# Patient Record
Sex: MALE | Race: WHITE | NOT HISPANIC OR LATINO | Employment: FULL TIME | URBAN - NONMETROPOLITAN AREA
[De-identification: names, ages, dates, MRNs, and addresses within clinical notes are randomized per-mention and may not be internally consistent; named-entity substitution may affect disease eponyms.]

---

## 2017-03-20 ENCOUNTER — OFFICE VISIT (OUTPATIENT)
Dept: FAMILY MEDICINE CLINIC | Facility: CLINIC | Age: 47
End: 2017-03-20

## 2017-03-20 VITALS
TEMPERATURE: 98.7 F | HEART RATE: 85 BPM | DIASTOLIC BLOOD PRESSURE: 82 MMHG | SYSTOLIC BLOOD PRESSURE: 134 MMHG | RESPIRATION RATE: 12 BRPM | HEIGHT: 72 IN | OXYGEN SATURATION: 98 % | WEIGHT: 314 LBS | BODY MASS INDEX: 42.53 KG/M2

## 2017-03-20 DIAGNOSIS — G47.30 SLEEP APNEA, UNSPECIFIED: Primary | ICD-10-CM

## 2017-03-20 PROBLEM — Z86.59 HISTORY OF TICS: Status: ACTIVE | Noted: 2017-03-20

## 2017-03-20 PROCEDURE — 99213 OFFICE O/P EST LOW 20 MIN: CPT | Performed by: NURSE PRACTITIONER

## 2017-03-20 NOTE — PATIENT INSTRUCTIONS
Sleep Apnea   Sleep apnea is a sleep disorder characterized by abnormal pauses in breathing while you sleep. When your breathing pauses, the level of oxygen in your blood decreases. This causes you to move out of deep sleep and into light sleep. As a result, your quality of sleep is poor, and the system that carries your blood throughout your body (cardiovascular system) experiences stress. If sleep apnea remains untreated, the following conditions can develop:  · High blood pressure (hypertension).  · Coronary artery disease.  · Inability to achieve or maintain an erection (impotence).  · Impairment of your thought process (cognitive dysfunction).  There are three types of sleep apnea:  1. Obstructive sleep apnea--Pauses in breathing during sleep because of a blocked airway.  2. Central sleep apnea--Pauses in breathing during sleep because the area of the brain that controls your breathing does not send the correct signals to the muscles that control breathing.  3. Mixed sleep apnea--A combination of both obstructive and central sleep apnea.  RISK FACTORS  The following risk factors can increase your risk of developing sleep apnea:  · Being overweight.  · Smoking.  · Having narrow passages in your nose and throat.  · Being of older age.  · Being male.  · Alcohol use.  · Sedative and tranquilizer use.  · Ethnicity. Among individuals younger than 35 years,  Americans are at increased risk of sleep apnea.  SYMPTOMS   · Difficulty staying asleep.  · Daytime sleepiness and fatigue.  · Loss of energy.  · Irritability.  · Loud, heavy snoring.  · Morning headaches.  · Trouble concentrating.  · Forgetfulness.  · Decreased interest in sex.  · Unexplained sleepiness.  DIAGNOSIS   In order to diagnose sleep apnea, your caregiver will perform a physical examination. A sleep study done in the comfort of your own home may be appropriate if you are otherwise healthy. Your caregiver may also recommend that you spend the  "night in a sleep lab. In the sleep lab, several monitors record information about your heart, lungs, and brain while you sleep. Your leg and arm movements and blood oxygen level are also recorded.  TREATMENT  The following actions may help to resolve mild sleep apnea:  · Sleeping on your side.    · Using a decongestant if you have nasal congestion.    · Avoiding the use of depressants, including alcohol, sedatives, and narcotics.    · Losing weight and modifying your diet if you are overweight.  There also are devices and treatments to help open your airway:  · Oral appliances. These are custom-made mouthpieces that shift your lower jaw forward and slightly open your bite. This opens your airway.  · Devices that create positive airway pressure. This positive pressure \"splints\" your airway open to help you breathe better during sleep. The following devices create positive airway pressure:    Continuous positive airway pressure (CPAP) device. The CPAP device creates a continuous level of air pressure with an air pump. The air is delivered to your airway through a mask while you sleep. This continuous pressure keeps your airway open.    Nasal expiratory positive airway pressure (EPAP) device. The EPAP device creates positive air pressure as you exhale. The device consists of single-use valves, which are inserted into each nostril and held in place by adhesive. The valves create very little resistance when you inhale but create much more resistance when you exhale. That increased resistance creates the positive airway pressure. This positive pressure while you exhale keeps your airway open, making it easier to breath when you inhale again.    Bilevel positive airway pressure (BPAP) device. The BPAP device is used mainly in patients with central sleep apnea. This device is similar to the CPAP device because it also uses an air pump to deliver continuous air pressure through a mask. However, with the BPAP machine, the " pressure is set at two different levels. The pressure when you exhale is lower than the pressure when you inhale.  · Surgery. Typically, surgery is only done if you cannot comply with less invasive treatments or if the less invasive treatments do not improve your condition. Surgery involves removing excess tissue in your airway to create a wider passage way.     This information is not intended to replace advice given to you by your health care provider. Make sure you discuss any questions you have with your health care provider.     Document Released: 12/08/2003 Document Revised: 01/08/2016 Document Reviewed: 09/26/2016  [employer] Interactive Patient Education ©2016 [employer]

## 2017-03-20 NOTE — PROGRESS NOTES
Chief Complaint   Patient presents with   • Sleep Apnea        No Known Allergies    HPI: Danny Vega is a 46 y.o. male presents today for evaluation of sleep apnea.  He has difficulty sleeping and snoring,  Wife has sleep apnea and he tried her CPAP and he sleep very good and wife did not snore at all.   Rates overall 9/10. Has Hx of HTN stable with lisinopril, and hyperlipidemia stable with omega 3 fatty acids, barretts esophagus.  We have discussed sleep apnea and weight in the past and he says he is ready to do something about it.  We discussed contrave in the past and wants to try it.      Past Medical History   Diagnosis Date   • GERD (gastroesophageal reflux disease)    • Hyperlipidemia    • Hypertension      Past Surgical History   Procedure Laterality Date   • Appendectomy       Social History     Social History   • Marital status:      Spouse name: N/A   • Number of children: N/A   • Years of education: N/A     Social History Main Topics   • Smoking status: Never Smoker   • Smokeless tobacco: Never Used   • Alcohol use No   • Drug use: No   • Sexual activity: Defer     Other Topics Concern   • None     Social History Narrative     Family History   Problem Relation Age of Onset   • Hypertension Mother    • Hypertension Father    • No Known Problems Sister    • No Known Problems Daughter    • Diabetes Maternal Grandmother    • Hypertension Maternal Grandmother    • Dementia Maternal Grandmother    • Cancer Maternal Grandfather    • Diabetes Paternal Grandmother    • Cancer Paternal Grandfather        Current Outpatient Prescriptions on File Prior to Visit   Medication Sig Dispense Refill   • aspirin 81 MG EC tablet Take 81 mg by mouth Daily.     • glucosamine sulfate 500 MG capsule capsule Take  by mouth Daily.     • lisinopril (PRINIVIL,ZESTRIL) 10 MG tablet Take 1 tablet by mouth Daily. 90 tablet 1   • Multiple Vitamins-Minerals (MULTIVITAMIN PO) Take  by mouth Daily.     • Omega-3 Fatty  "Acids (FISH OIL) 1000 MG capsule capsule Take  by mouth Daily.     • RABEprazole (ACIPHEX) 20 MG EC tablet Take 20 mg by mouth Daily.       No current facility-administered medications on file prior to visit.         REVIEW OF SYMPTOMS: (Positives bolded)  General:  weight loss, fever, chills, night sweats, fatigue, appetite loss, daytime sleepiness   Respiratory: shortness of breath, cough, hemoptysis, wheezing, pleurisy, snoring   Cardiovascular:  chest pain, PND, palpitation, edema, orthopnea, syncope, swelling of extremities  Gastro: Nausea, vomiting, diarrhea, hematemesis, abdominal pain, constipation  Genito: hematuria, dysuria, glycosuria, hesitancy, frequency, incontinence  \"All other systems reviewed and negative, except as listed above.”      OBJECTIVE:  Constitutional:  Appearance-No acute distress, Consistent with stated age. Orientation- Oriented x 3, alert Posture-Not doubled over. Gait-Normal pace, normal arm movement. Posture- Normal Build and Nutrition-Well developed and well nourished.  General- Patient is pleasant and cooperative with the interview and exam.    Integumentary: General-No rashes, ulcers or lesions. No edema.  Palpation- Normal skin moisture/turgor. Skin is warm to touch, no increased warmth. Capillary refill is normal bilateral Upper and lower extremity.     Head/Neck: Head- normocephalic and atraumatic.  Neck- without visible/palpable lumps or pulsations.  Palpation- No bony tenderness about head/neck along frontal, occiptial, temporal, parietal, mastoid, jawline, zygoma, orbit or any other location.  NO temporal artery tenderness. No TMJ tenderness. Normal cervical ROM.   Neck Supple.  Thyroid-No thyromegaly, no nodules    Eye: Bilaterally PERRLA, EOMI.  No discharge.  Upper and lower eyelids are normal. Sclera/conjunctiva normal without discharge. Cornea is normal and clear. Lens is normal.  Eyeball appears normal. No ciliary flushing, no conjunctival injection.    ENMT:  Pinna- " normal without tenderness or erythema.  External auditory canal Left- normal without erythema or discharge, no excessive cerumen. External auditory canal Right-normal without erythema or discharge, no excessive cerumen. TM left- Grey/pearly, normal light reflex and anatomy TM Right- Grey/pearly, normal light reflex and anatomy Hearing Assessment-normal to conversational speech at 2-5 feet.  Nose and sinus-No sinus tenderness along frontal/maxillary region. External appearance normal and midline. Nares- bilateral quiet airflow, no discharge. Nasal mucosa- No bleeding noted and no ulcerations observed. Pink, moist. Turbinates non boggy. Lips- normal color, moist without cracks/lesions Oral Cavity/Palate- hard/soft palate intact without lesions, oral mucosa pink and moist. Dentition assessed and discussed appropriate oral care. Tongue normal midline.  Oropharynx- no pharyngeal erythema, Uvula midline. No post nasal drip. No exudate. Salivary glands- Non tender to palpation.  Does have facial tics where he winks, muscle gets tense and quivers.     CHEST/LUNG: Inspection- symmetric chest wall no pectus deformity. Normal effort, no distress, no use of accessory muscles. Palpation- nontender sternum, ribline.  No abnormal pulsations. Auscultation- Breath sounds normal throughout all lung fields.  Normal tracheal sounds, Normal bronchial sounds overlying sternum, Bronchovessicular sounds normal between scapulae posteriorly, Normal vessicular breath sounds heard throughout periphery. Lungs are clear today. Adventitious sounds- No wheezes, rales, rhonchi.     CARDIOVASCULAR:  Carotid artery- normal, no bruits or abnormal pulsations. Jugular vein- no pulsations. Palpation/Percussion- Normal PMI, no palpable thrill  Auscultation- Regular rate and rhythm. No murmur noted in sitting, supine positions. Extremities- no digital clubbing, cyanosis, edema, increased warmth.    Neuropsych: Oriented- Person, place, time. (AAOx3),  Mood/affect- normal and congruent. Able to articulate well. Speech-Normal speech, normal rate, normal tone, normal use of language, volume and coherence.  Thought content- normal with ability to perform basic computations and apply abstract thought/reason. Associations- intact, no SI/HI, no hallucinations, delusions, obsessions.  Judgment/insight- Appropriate. Memory-Recall intact, remote and recent memory intact. Knowledge- Age appropriate fund of knowledge, concentration and attention span normal.    Lymphatic: Head/Neck- normal size and non tender to palpation. Axillary- Head and neck LN are normal size and non tender to palpation. Femoral and Inguinal- normal size and non tender to palpation.      Assessment/Plan:  Danny was seen today for sleep apnea.    Diagnoses and all orders for this visit:    Sleep apnea, unspecified  -     Overnight Sleep Oximetry Study    Neck 19  3/4  Bang evaluation done- all answers yes  Epthworth:  Score 9.       1. Obesity: Federal guidelines recommend that people under the age of 65 should have a BMI of 18.5-24.9 and people age 65 and older should have a BMI of 23-30. The patient BMI 42.5 is outside this range and we recommended/discussed today to utilize a diet/exercise program to get back into the appropriate range.  Today we encouraged roughly a 1 lb per week weight loss with initial goal of 5% weight loss. The initial step is to document everything that is consumed into a food diary.  Studies have shown that patients can lose up to 2x the weight by keeping track of foods.     a. Discussed if eating out for a meal, consider cutting food in half and placing into to-go container.  Individually portion any foods coming into the home based on package.    b. Consider using smaller plates.    c. Consider drinking 12 oz of water 30 minutes before meal as way to suppress appetite.   d. Cut back on soft drinks/juices.  Discussed 1 can of regular soft drink has roughly 150-170 Calories  per day.    e. Increase exercise as able. It is recommended to try to exercise minimum 10 minutes 5 days per week.  The goal over the next 2-4 weeks is to walk 30 minutes per day 5 days per week at pace difficult to hold conversation.                F.  He wants to consider contrave but we will revisit this after he gets his sleep apnea evaluated.      Return in about 3 weeks (around 4/10/2017).    Ronit Mccabe, YURIY, APRN-BC  03/20/2017

## 2017-03-31 ENCOUNTER — TELEPHONE (OUTPATIENT)
Dept: FAMILY MEDICINE CLINIC | Facility: CLINIC | Age: 47
End: 2017-03-31

## 2017-03-31 NOTE — TELEPHONE ENCOUNTER
PATIENT WAS CALLED AND NOTIFIED OF HIS SLEEP STUDY RESULTS. I TOLD THE PATIENT THAT AN ORDER WAS SENT IN FOR A CPAP MACHINE.

## 2017-03-31 NOTE — TELEPHONE ENCOUNTER
----- Message from Ronit Mccabe DNP, CHUY sent at 3/31/2017  1:23 PM CDT -----  I have reviewed the sleep study.  I ordered supplies.  Please tell the pt that he has sleep apnea and I have ordered supplies.      Follow up in 31-90 days      ----- Message -----     From: Maya Tran MA     Sent: 3/31/2017  10:27 AM       To: Ronit Mccabe DNP, CHUY    I HAVE SCANNED HIS SLEEP STUDY PLEASE REVIEW THE RESULTS AND ADVISE    PATIENT WAS NOTIFIED OF HIS RESULTS BY JOSE RAMON VELIZ AT 345PM

## 2017-04-20 ENCOUNTER — TELEPHONE (OUTPATIENT)
Dept: FAMILY MEDICINE CLINIC | Facility: CLINIC | Age: 47
End: 2017-04-20

## 2017-04-20 NOTE — TELEPHONE ENCOUNTER
I agree with you that it is not safe for the pt to use his wives machine and that the pressure settings are not for him and it could be deadly.  I highly recommend everything that you told him and documented.  I can not prevent him from using his wives machine and I am confused?  If he is using it what is she using.  If he calls again continue to tell him the same thing, I do not recommend it.  There is no way for us to get a compliance report because it is not setting for him and there would be no way to know who used it.  All we can do at this point is advise against it.   File in chart when done reading.

## 2017-04-20 NOTE — TELEPHONE ENCOUNTER
PATIENT CALLED STATING THAT HE DID HAVE THE HOME SLEEP STUDY DONE HOWEVER HE IS NOT WANTING TO HAVE THE CPAP MACHINE BROUGHT OUT DUE TO COST. HE STATES THAT HE IS USING HIS WIFES MACHINE I EXPLAINED TO THE PATIENT THAT IT IS SET A PRESSURE THAT BEST FITS HIS WIFE AND HER NEEDS. THAT IT IS NOT SAFE TO USE A CPAP THAT IS NOT SETUP TO YOUR NEEDS. PATIENT STATES THAT HE IS GOING TO USE IT AND THAT HE UNDERSTANDS THAT USING THE MACHINE WITH FORCEFUL AIR CONTINUOUSLY AT A PRESSURE THAT MAY NOT BE BEST FOR YOU COULD BE FATAL HE UNDERSTOOD.

## 2017-06-17 DIAGNOSIS — E78.49 OTHER HYPERLIPIDEMIA: ICD-10-CM

## 2017-06-19 RX ORDER — LISINOPRIL 10 MG/1
TABLET ORAL
Qty: 90 TABLET | Refills: 0 | OUTPATIENT
Start: 2017-06-19

## 2017-06-29 ENCOUNTER — OFFICE VISIT (OUTPATIENT)
Dept: FAMILY MEDICINE CLINIC | Facility: CLINIC | Age: 47
End: 2017-06-29

## 2017-06-29 VITALS
OXYGEN SATURATION: 97 % | WEIGHT: 314 LBS | HEIGHT: 72 IN | SYSTOLIC BLOOD PRESSURE: 142 MMHG | RESPIRATION RATE: 16 BRPM | HEART RATE: 68 BPM | BODY MASS INDEX: 42.53 KG/M2 | DIASTOLIC BLOOD PRESSURE: 90 MMHG | TEMPERATURE: 98.6 F

## 2017-06-29 DIAGNOSIS — Z99.89 OSA ON CPAP: Primary | ICD-10-CM

## 2017-06-29 DIAGNOSIS — G47.33 OSA ON CPAP: Primary | ICD-10-CM

## 2017-06-29 DIAGNOSIS — E78.49 OTHER HYPERLIPIDEMIA: ICD-10-CM

## 2017-06-29 DIAGNOSIS — K21.9 GASTROESOPHAGEAL REFLUX DISEASE WITHOUT ESOPHAGITIS: ICD-10-CM

## 2017-06-29 PROCEDURE — 99214 OFFICE O/P EST MOD 30 MIN: CPT | Performed by: NURSE PRACTITIONER

## 2017-06-29 RX ORDER — RABEPRAZOLE SODIUM 20 MG/1
20 TABLET, DELAYED RELEASE ORAL DAILY
Qty: 90 TABLET | Refills: 1 | Status: SHIPPED | OUTPATIENT
Start: 2017-06-29 | End: 2018-06-22 | Stop reason: SDUPTHER

## 2017-06-29 RX ORDER — LISINOPRIL 10 MG/1
10 TABLET ORAL DAILY
Qty: 90 TABLET | Refills: 1 | Status: SHIPPED | OUTPATIENT
Start: 2017-06-29 | End: 2017-12-06 | Stop reason: SDUPTHER

## 2017-06-29 NOTE — PROGRESS NOTES
"  Chief Complaint   Patient presents with   • Hypertension   • Heartburn      No Known Allergies    HPI: Danny Vega is a 46 y.o. male presents today for chronic problems of hyperlipidemia stable with omega 3 fatty acids, Obesity, GERD rabeprazole, HTN stable with lisinopril, generalized muscular joint pain, sleep apnea newly diagnosed couple months ago and he has been using the sleep apnea machine and he is sleeping good, sleeps about 8 hours a day.  Rates overall 3/10 says he feels much better with CPAP.  He is very concerned about his weight and wants to try contrave.      Obesity:    How many years has been overweight  Of 50 pounds:  10 yrs  How many years has been overweight 100 pounds:   25 yrs    What diets have you tried and failed:  Weight watchers, calorie count, Atkins,  Decreasing calories and exercise.     Top junk foods include cakes, candies and chocolate    Diet/nutritional counseling    HISTORY:    History provided by: self      Weight:  194.6   Weight:  64\"   BMI:  33.4                                                       How many yrs have you been >50 pounds over weight? [x] 2 yrs   How many yrs have you been >100 pounds over weight?  [x] 0    What co-morbidities do you have?  OMERO, GERD, hyperlipidemia, HTN    What type of Exercise do you participate in?  Walking     How many calories do you eat a day? 2500    What are your worst junk foods:   Chocolate, Cakes, Chips, Chocolate     Are you ready to make changes  yes     How often in a week do you eat out?  Eats out a lot 12 both fast food and resturants    Do you eat breakfast  yes    How many sweetened beverages do you consume in a day?  Drinks diet and water    How many fruits and vegeatables do you consume in a day?   3 servings/24    How often in a weak do you eat red meat?  Daily      How often do you snack during the day? 1 time     mostly active     How important is it for you to control your weight on a ten point scale 1 being not " important and 10 being very important? 10    How confident are you that you can control your weight on the same ten point scale?  8      Past Medical History:   Diagnosis Date   • GERD (gastroesophageal reflux disease)    • Hyperlipidemia    • Hypertension      Past Surgical History:   Procedure Laterality Date   • APPENDECTOMY       Social History     Social History   • Marital status:      Spouse name: N/A   • Number of children: N/A   • Years of education: N/A     Social History Main Topics   • Smoking status: Never Smoker   • Smokeless tobacco: Never Used   • Alcohol use No   • Drug use: No   • Sexual activity: Defer     Other Topics Concern   • None     Social History Narrative     Family History   Problem Relation Age of Onset   • Hypertension Mother    • Hypertension Father    • No Known Problems Sister    • No Known Problems Daughter    • Diabetes Maternal Grandmother    • Hypertension Maternal Grandmother    • Dementia Maternal Grandmother    • Cancer Maternal Grandfather    • Diabetes Paternal Grandmother    • Cancer Paternal Grandfather        Current Outpatient Prescriptions on File Prior to Visit   Medication Sig Dispense Refill   • aspirin 81 MG EC tablet Take 81 mg by mouth Daily.     • glucosamine sulfate 500 MG capsule capsule Take  by mouth Daily.     • lisinopril (PRINIVIL,ZESTRIL) 10 MG tablet Take 1 tablet by mouth Daily. 90 tablet 1   • Multiple Vitamins-Minerals (MULTIVITAMIN PO) Take  by mouth Daily.     • Omega-3 Fatty Acids (FISH OIL) 1000 MG capsule capsule Take  by mouth Daily.     • RABEprazole (ACIPHEX) 20 MG EC tablet Take 20 mg by mouth Daily.       No current facility-administered medications on file prior to visit.         REVIEW OF SYMPTOMS: (Positives bolded)  General:  weight loss, fever, chills, night sweats, fatigue, appetite loss, Obesity  Respiratory: shortness of breath, cough, hemoptysis, wheezing, pleurisy,   Cardiovascular:  chest pain, PND, palpitation, edema,  "orthopnea, syncope, swelling of extremities  Gastro: Nausea, vomiting, diarrhea, hematemesis, abdominal pain, constipation  Genito: hematuria, dysuria, glycosuria, hesitancy, frequency, incontinence  \"All other systems reviewed and negative, except as listed above.”      OBJECTIVE:  Constitutional:  Appearance-No acute distress, Consistent with stated age. Orientation- Oriented x 3, alert Posture-Not doubled over. Gait-Normal pace, normal arm movement. Posture- Normal Build and Nutrition-Well developed and well nourished.  General- Patient is pleasant and cooperative with the interview and exam.    Integumentary: General-No rashes, ulcers or lesions. No edema.  Palpation- Normal skin moisture/turgor. Skin is warm to touch, no increased warmth. Capillary refill is normal bilateral Upper and lower extremity.     Head/Neck: Head- normocephalic and atraumatic.  Neck- without visible/palpable lumps or pulsations.  Palpation- No bony tenderness about head/neck along frontal, occiptial, temporal, parietal, mastoid, jawline, zygoma, orbit or any other location.  NO temporal artery tenderness. No TMJ tenderness. Normal cervical ROM.   Neck Supple.  Thyroid-No thyromegaly, no nodules    Eye: Bilaterally PERRLA, EOMI.  No discharge.  Upper and lower eyelids are normal. Sclera/conjunctiva normal without discharge. Cornea is normal and clear. Lens is normal.  Eyeball appears normal. No ciliary flushing, no conjunctival injection.    ENMT:  Pinna- normal without tenderness or erythema.  External auditory canal Left- normal without erythema or discharge, no excessive cerumen. External auditory canal Right-normal without erythema or discharge, no excessive cerumen. TM left- Grey/pearly, normal light reflex and anatomy TM Right- Grey/pearly, normal light reflex and anatomy Hearing Assessment-normal to conversational speech at 2-5 feet.  Nose and sinus-No sinus tenderness along frontal/maxillary region. External appearance normal and " midline. Nares- bilateral quiet airflow, no discharge. Nasal mucosa- No bleeding noted and no ulcerations observed. Pink, moist. Turbinates non boggy. Lips- normal color, moist without cracks/lesions Oral Cavity/Palate- hard/soft palate intact without lesions, oral mucosa pink and moist. Dentition assessed and discussed appropriate oral care. Tongue normal midline.  Oropharynx- no pharyngeal erythema, Uvula midline. No post nasal drip. No exudate. Salivary glands- Non tender to palpation    CHEST/LUNG: Inspection- symmetric chest wall no pectus deformity. Normal effort, no distress, no use of accessory muscles. Palpation- nontender sternum, ribline.  No abnormal pulsations. Auscultation- Breath sounds normal throughout all lung fields.  Normal tracheal sounds, Normal bronchial sounds overlying sternum, Bronchovessicular sounds normal between scapulae posteriorly, Normal vessicular breath sounds heard throughout periphery. Lungs are clear today. Adventitious sounds- No wheezes, rales, rhonchi.     CARDIOVASCULAR:  Carotid artery- normal, no bruits or abnormal pulsations. Jugular vein- no pulsations. Palpation/Percussion- Normal PMI, no palpable thrill  Auscultation- Regular rate and rhythm. No murmur noted in sitting, supine positions. Extremities- no digital clubbing, cyanosis, edema, increased warmth.    ABDOMEN: Inspection- normal and no visible pulsations. Normal contour. Auscultation- Bowel sounds normal, no abdominal bruits. Palpation/Percussion- soft, non-tender, no rebound tenderness, no rigidity (guarding), no jar tenderness, no masses.  Liver-no hepatomegaly, Spleen - no splenomegaly, Hernias- none. Rectal not examined.     Peripheral Vascular: Upper extremity Left- Normal temperature with pink nailbeds and no ulcerations.  Upper extremity Right- Normal temperature with pink nailbeds and no ulcerations.  Lower extremity- Normal temperature with pink nailbeds and no ulcerations. DP pulses 2+ bilaterally.   Pedal hair intact.  Normal capillary refill. Edema- No edema.      Neurological: General- Moves all 4 extremities symmetrically. Symmetrical face and body posture. Cranial nerves- individually evaluated II-XII and intact. PERRLA, Normal EOMI, visual/special senses appear intact, Face is symmetrical and normal sensation/movement, normal tongue, normal strength/posture of neck musculature. Balance- Romberg intact.  Reflexes- ntact with DTR 2+ patellar, Achilles, bicep, brachial,tricep. Ankle clonus normal with 2 beats.  Strength- 5/5 bilateral UE and LE. Soft touch- intact bilateral UE and LE.  Temperature sensation- intact bilateral UE and LE. Cerebellar testing-Rapid alternating movements intact.  Heel shin intact. Able to walk normal gait, normal heel toe walking. Neck- supple.      Neuropsych: Oriented- Person, place, time. (AAOx3), Mood/affect- normal and congruent. Able to articulate well. Speech-Normal speech, normal rate, normal tone, normal use of language, volume and coherence.  Thought content- normal with ability to perform basic computations and apply abstract thought/reason. Associations- intact, no SI/HI, no hallucinations, delusions, obsessions.  Judgment/insight- Appropriate. Memory-Recall intact, remote and recent memory intact. Knowledge- Age appropriate fund of knowledge, concentration and attention span normal.    Lymphatic: Head/Neck- normal size and non tender to palpation. Axillary- Head and neck LN are normal size and non tender to palpation. Femoral and Inguinal- normal size and non tender to palpation.      Assessment/Plan:  Danny was seen today for hypertension and heartburn.    Diagnoses and all orders for this visit:    OMERO on CPAP    Gastroesophageal reflux disease without esophagitis  -     RABEprazole (ACIPHEX) 20 MG EC tablet; Take 1 tablet by mouth Daily.    HTN       -    Lisinopril daily    HTN: Suspect Essential HTN.Good BP control is encouraged with Goal BP based on JNC 8  guidelines 2014 <140/90 for patients with known cardiac disease and diabetes. (AJ. 2014:322 (5):507-520. doi:10.1001/aj.2013.77750): general population <60 yr old goal BP <140/90 and for those >60 <150/90.  For patients of all ages with Diabetes, CKD, Known CAD <140/90. Recommended to the patient to obtain electronic home BP machine with upper arm blood pressure cuff and to check regularly as instructed.  Keep BP log and bring to subsequent visits. Stable, at goal.  a. LABS: CBC, CMP  Has labs from work will bring them in.   b. Recommend if you do not have a home BP machine to obtain an electronic machine with arm blood pressure cuff.      c. Monitor BP over the next week and keep log to bring back to office. Discussed medication therapy however pt wants to try to control with diet exercise. .  Your provider  has recommended self-monitoring of your blood pressure.  If you do not have a blood pressure cuff you may purchase one from the local pharmacy.  You may ask the pharmacist which brand and model they recommend.  Obtain your blood pressure measurement at least 2x per week.  You should also check your blood pressure if you experience any symptoms of blurred visit, dizziness or headache.  Please record all blood pressure measurements and bring them to next office visit.  If you have any questions about the accuracy of your blood pressure machine please bring it in to the office and our staff will be happy to check accuracy.   d. Encouraged to eat a low sodium heart healthy diet  e. Offered handout on HTN educational topics.  These were provided if patient requested these today.  f. MEDS: as listed below  g. Risks/benefits of current and new medications discussed with the patient and or family today.  The patient/family are aware and accept that if there any side effects they should call or return to clinic as soon as possible.  Appropriate F/U discussed for topics addressed today. All questions were answered to  the  satisfactory state of patient/family.  Should symptoms fail to improve or worsen they agree to call or return to clinic or to go to the ER. Education handouts were offered on any new Rx if requested.  Discussed the importance of following up with any needed screening tests/labs/specialist appointments and any requested follow-up recommended by me today.  Importance of maintaining follow-up discussed and patient accepts that missed appointments can delay diagnosis and potentially lead to worsening of conditions.  h. ADA diet encouraged      Other hyperlipidemia  -     lisinopril (PRINIVIL,ZESTRIL) 10 MG tablet; Take 1 tablet by mouth Daily.    Hyperlipidemia: Current guidelines support moderate intensity statin with goal of 30-50% reduction in LDL unless 10 yr risk ASCVD >7.5 then high intensity should be used.  a. Labs: Lipid panel  Had at work will bring to us  b. Offered handout on Elevated cholesterol topics.  Provided if patient requested these today.  c. Moderate potency statins include atorvastatin 20mg, pravastatin 40 mg or Rosuvastatin 10 mg.  d. Laboratory Monitoring:    i. If starting statins can consider repeat lipid panel and CMP in 8 weeks.   ii. Current literature supports limited need to monitor CMP due to low risks of liver enzyme changes.  Consider repeat in 3 months. Risks include abdominal pain, dark urine, change in stools.  Monitor for DM (especially in women) during treatment.  iii. Consider to monitor CPK if family history, severe myalgia or dark urine.  iv. Lipid panel should be checked at baseline and then 8-12 weeks after medication change.  If 2 consecutive LDL <40 consider changing dose.  Minimum monitoring of yearly.   e. Major side effects reported include muscle cramps and pain, kidney and liver changes and diabetes.  We will monitor blood work for kidney/liver. Take Rx at night. If any muscle cramps call clinic.      Obesity: Obesity Plan:    BMI 40.0-44.9, adult  -      naltrexone-bupropion ER (CONTRAVE) 8-90 MG tablet; Wk 1:  1 tab daily  Wk 2:  1 tab in am and 1 tab in pm  Wk 3:   2 tabs in am and 1 tab in pm  Wk 4:  2 tabs in am and 2 tabs in pm      The patient BMI is outside this range and we recommended/discussed today to utilize a diet/exercise program to get back into the appropriate range.  Federal guidelines recommend that people under the age of 65 should have a BMI of 18.5-24.9  The initial step is to document everything that is consumed into a food diary. Studies have shown that patients can lose up to 2x the weight by keeping track of foods.   Choose one bad food weekly and eliminate it from your diet.  Replace with one healthy food  Goal over next 2-4 weeks is walk 30 minutes per day 5 days per week at pace difficult to hold conversation.   Drink more water, less soda.   Cut back on portion sizes.   Today we encouraged roughly a 1 lb per week weight loss with initial goal of 5% weight loss.  Discussed if eating out for a meal, consider cutting food in half and placing into a to go container.  Individually portion any foods coming into the home based on package.  Use smaller plates  Drinking 12 oz of water 30 minutes before meal as way to suppress appetite.  Medications discussed today include metformin, topamax, phentermine, Qsymia, Belviq (lorcaserin), Contrave (Buproprion/naltrexone).    Nutritional counseling and supervised diet visit monthly x 6 months  Lifestyle therapy   Simple advice to lose weight   Internet programs or self help books.    Advice from dietitian  Set Goals that are realistic   Encouraged to use visual aides to help in measuring foods  Baseball-1 cup good for green salad, frozen yogurt, medium piece of fruit, baked potato  Handful - ½ cup good cut fruit, cooked vegetables, pasta, rice  Egg- ¼ cup good for dried fruit  Deck of cards-3 ounces good for meat and poultry  Check book-3 ounces good for grilled fish  6 dice side by side- 1 ½ ounces  good for natural cheese  Simple tips   Plate method-reduce plate size to 9 inch dinner plate.  Half of plate should be filled with non-starchy vegetables (broccoli, lettuce, cauliflower, tomatoes), ¼ plate with lean source of protein (lean chicken, turkey, fish), remaining ½ with whole grains (brown rice, potato, whole grain breads  Avoid liquid calories (regular soda, juice, coffee with cream)  Focus  on water, seltzer water and other non-calorie drinks  Replace regular sugar with non-caloric sweeteners  Avoid skipping meals: plan small regular meals throughout the day in order to keep your hunger controlled  Consider using meal replacements if unable to plan a healthy meal (protein shake, high protein bar)  Replace all white bread with whole wheat/whole grain alternative  Swap regular salad dressings (mayonnaise, butter, or low fat or fat free alternative)  Avoid high fat, high calorie, high carbohydrate snakes (cookies, pastries, cakes)  Snack on fruits, low fat dairy (yogurt, cottage cheese)    Behavioral Modification  Self-Monitoring of dietary Intake-keep a dietary intake log. Obese individuals have been shown to underestimate their food intake  Behavioral treatment -gives exacts about amount and total calories  Read food labels          Return in about 6 months (around 12/29/2017), or 1 month for weight management.      Ronit Mccabe, DNP, APRN-BC  06/29/2017

## 2017-06-29 NOTE — PATIENT INSTRUCTIONS
Obesity, Adult  Obesity is the condition of having too much total body fat. Being overweight or obese means that your weight is greater than what is considered healthy for your body size. Obesity is determined by a measurement called BMI. BMI is an estimate of body fat and is calculated from height and weight. For adults, a BMI of 30 or higher is considered obese.  Obesity can eventually lead to other health concerns and major illnesses, including:  · Stroke.  · Coronary artery disease (CAD).  · Type 2 diabetes.  · Some types of cancer, including cancers of the colon, breast, uterus, and gallbladder.  · Osteoarthritis.  · High blood pressure (hypertension).  · High cholesterol.  · Sleep apnea.  · Gallbladder stones.  · Infertility problems.   CAUSES  The main cause of obesity is taking in (consuming) more calories than your body uses for energy. Other factors that contribute to this condition may include:  · Being born with genes that make you more likely to become obese.  · Having a medical condition that causes obesity. These conditions include:    Hypothyroidism.    Polycystic ovarian syndrome (PCOS).    Binge-eating disorder.    Cushing syndrome.  · Taking certain medicines, such as steroids, antidepressants, and seizure medicines.  · Not being physically active (sedentary lifestyle).  · Living where there are limited places to exercise safely or buy healthy foods.  · Not getting enough sleep.  RISK FACTORS  The following factors may increase your risk of this condition:  · Having a family history of obesity.  · Being a woman of -American descent.  · Being a man of  descent.  SYMPTOMS  Having excessive body fat is the main symptom of this condition.  DIAGNOSIS  This condition may be diagnosed based on:  · Your symptoms.  · Your medical history.  · A physical exam. Your health care provider may measure:    Your BMI. If you are an adult with a BMI between 25 and less than 30, you are considered  overweight. If you are an adult with a BMI of 30 or higher, you are considered obese.    The distances around your hips and your waist (circumferences). These may be compared to each other to help diagnose your condition.    Your skinfold thickness. Your health care provider may gently pinch a fold of your skin and measure it.  TREATMENT  Treatment for this condition often includes changing your lifestyle. Treatment may include some or all of the following:  · Dietary changes. Work with your health care provider and a dietitian to set a weight-loss goal that is healthy and reasonable for you. Dietary changes may include eating:    Smaller portions. A portion size is the amount of a particular food that is healthy for you to eat at one time. This varies from person to person.    Low-calorie or low-fat options.    More whole grains, fruits, and vegetables.  · Regular physical activity. This may include aerobic activity (cardio) and strength training.  · Medicine to help you lose weight. Your health care provider may prescribe medicine if you are unable to lose 1 pound a week after 6 weeks of eating more healthily and doing more physical activity.  · Surgery. Surgical options may include gastric banding and gastric bypass. Surgery may be done if:    Other treatments have not helped to improve your condition.    You have a BMI of 40 or higher.    You have life-threatening health problems related to obesity.  HOME CARE INSTRUCTIONS  Eating and Drinking  · Follow recommendations from your health care provider about what you eat and drink. Your health care provider may advise you to:    Limit fast foods, sweets, and processed snack foods.    Choose low-fat options, such as low-fat milk instead of whole milk.    Eat 5 or more servings of fruits or vegetables every day.    Eat at home more often. This gives you more control over what you eat.    Choose healthy foods when you eat out.    Learn what a healthy portion size  is.    Keep low-fat snacks on hand.    Avoid sugary drinks, such as soda, fruit juice, iced tea sweetened with sugar, and flavored milk.    Eat a healthy breakfast.  · Drink enough water to keep your urine clear or pale yellow.  · Do not go without eating for long periods of time (do not fast) or follow a fad diet. Fasting and fad diets can be unhealthy and even dangerous.  Physical Activity  · Exercise regularly, as told by your health care provider. Ask your health care provider what types of exercise are safe for you and how often you should exercise.  · Warm up and stretch before being active.  · Cool down and stretch after being active.  · Rest between periods of activity.  Lifestyle  · Limit the time that you spend in front of your TV, computer, or video game system.  · Find ways to reward yourself that do not involve food.  · Limit alcohol intake to no more than 1 drink a day for nonpregnant women and 2 drinks a day for men. One drink equals 12 oz of beer, 5 oz of wine, or 1½ oz of hard liquor.  General Instructions  · Keep a weight loss journal to keep track of the food you eat and how much you exercise you get.  · Take over-the-counter and prescription medicines only as told by your health care provider.  · Take vitamins and supplements only as told by your health care provider.  · Consider joining a support group. Your health care provider may be able to recommend a support group.  · Keep all follow-up visits as told by your health care provider. This is important.  SEEK MEDICAL CARE IF:  · You are unable to meet your weight loss goal after 6 weeks of dietary and lifestyle changes.     This information is not intended to replace advice given to you by your health care provider. Make sure you discuss any questions you have with your health care provider.     Document Released: 01/25/2006 Document Revised: 04/10/2017 Document Reviewed: 10/05/2016  [employer] Interactive Patient Education ©2017 [employer]

## 2017-09-26 ENCOUNTER — OFFICE VISIT (OUTPATIENT)
Dept: FAMILY MEDICINE CLINIC | Facility: CLINIC | Age: 47
End: 2017-09-26

## 2017-09-26 VITALS
BODY MASS INDEX: 42.66 KG/M2 | DIASTOLIC BLOOD PRESSURE: 84 MMHG | HEIGHT: 72 IN | HEART RATE: 74 BPM | SYSTOLIC BLOOD PRESSURE: 134 MMHG | RESPIRATION RATE: 16 BRPM | TEMPERATURE: 98.2 F | WEIGHT: 315 LBS | OXYGEN SATURATION: 97 %

## 2017-09-26 DIAGNOSIS — J01.00 ACUTE NON-RECURRENT MAXILLARY SINUSITIS: Primary | ICD-10-CM

## 2017-09-26 DIAGNOSIS — E66.01 MORBID OBESITY WITH BMI OF 40.0-44.9, ADULT (HCC): ICD-10-CM

## 2017-09-26 PROCEDURE — 99214 OFFICE O/P EST MOD 30 MIN: CPT | Performed by: NURSE PRACTITIONER

## 2017-09-26 RX ORDER — PREDNISONE 20 MG/1
20 TABLET ORAL DAILY
Qty: 5 TABLET | Refills: 0 | Status: SHIPPED | OUTPATIENT
Start: 2017-09-27 | End: 2017-10-02

## 2017-09-26 RX ORDER — AMOXICILLIN AND CLAVULANATE POTASSIUM 875; 125 MG/1; MG/1
1 TABLET, FILM COATED ORAL 2 TIMES DAILY
Qty: 14 TABLET | Refills: 0 | Status: SHIPPED | OUTPATIENT
Start: 2017-09-26 | End: 2017-10-02

## 2017-09-26 NOTE — PROGRESS NOTES
Subjective   Danny Vega is a 47 y.o. male is here for complaints of uri symptoms for the last week with increased cough, sore throat and congestion.   Also, needs to get refill on his contrave he has lost 20 pounds but says that today it doesn't show that he has lost weight because he has heavy clothing and steel toe boats.  He weighed this am and he was 312.  He has cut calories down to 2000 cals per day, and walks continually walking and busy at his job.  Also walks at home 3 times a week.  He is very excited because he is losing weight.     Chief Complaint   Patient presents with   • Weight Loss   • URI           HISTORY:    History provided by: self        The following portions of the patient's history were reviewed and updated as appropriate: allergies, current medications, past family history, past medical history, past social history, past surgical history and problem list.    No Known Allergies  Past Medical History:   Diagnosis Date   • GERD (gastroesophageal reflux disease)    • Hyperlipidemia    • Hypertension      Past Surgical History:   Procedure Laterality Date   • APPENDECTOMY       Social History     Social History   • Marital status:      Spouse name: N/A   • Number of children: N/A   • Years of education: N/A     Occupational History   • Not on file.     Social History Main Topics   • Smoking status: Never Smoker   • Smokeless tobacco: Never Used   • Alcohol use No   • Drug use: No   • Sexual activity: Defer     Other Topics Concern   • Not on file     Social History Narrative       Family History   Problem Relation Age of Onset   • Hypertension Mother    • Hypertension Father    • No Known Problems Sister    • No Known Problems Daughter    • Diabetes Maternal Grandmother    • Hypertension Maternal Grandmother    • Dementia Maternal Grandmother    • Cancer Maternal Grandfather    • Diabetes Paternal Grandmother    • Cancer Paternal Grandfather      BOLD indicates positive   General:   "weight loss, fever, chills, appetite loss, obesity  SKIN: change in wart/mole, itching, rash, new lesions, nail changes  HEENT:   ear pain, sore throat, sinus pressure, blurry vision, eye pain, dry eyes, tinnitus  Respiratory: cough, difficulty breathing, wheezing, hemoptysis   Cardiovascular:  chest pain, shortness of breath, swelling of extremities, syncope  Gastro: abdominal pain, constipation, nausea, vomiting, diarrhea, hematemesis  Genito: hematuria, dysuria, glycosuria, hesitancy, frequency, incontinence  Musckelo: joint pain, muscle cramps, arthralgia’s, muscle weakness, joint swelling, NSAID use  Neuro:  headache, tremors, numbness, memory loss, irritability, dizziness  \"All other systems reviewed and negative, except as listed above.”    PHYSICAL EXAM:  APPEARANCE: Alert, oriented x 3, well developed, well nourished, consistent with stated age, not in acute distress, normal posture, gait and station are normal.  Behavior is appropriate for age.     EYES: PERRLA, EOMI,  no discharge, no ciliary flushing, no conjunctival injection, normal upper and lower eyelids bilaterally.     OROPHARYNX: Pink and moist, no abnormalities.  No lesions in mouth, Uvula normal/midline    EARS:  AUDITORY CANALS: without redness or excess cerumen, no impaction  TYMPANIC MEMBRANES: pearly gray with good cone of light/landmarks, no bulging.     NARES:  without purulent discharge     CHEST/LUNG:   INSPECTION: symmetric, normal excursion and no use of accessory muscles.   PALPATION: Non-tender      AUSCULTATION: Respirations even, non labored. Breath sounds normal throughout lung fields.  No Wheezes, rales, rhonchi. Normal tracheal sounds, normal bronchial sounds overlying sternum, normal bronchovessicular sounds between scapulae posteriorly, normal vesicular breath sounds heard throughout periphery    CARDIOVASCULAR: Normal heart sounds with regular rate and rhythm.  Normal heart sounds S1-S2, No murmur    ABDOMINAL:  Soft, " "non-tender, no distended. Normal bowel sounds x 4.  No rebound, no guarding, no abnormal pulsations. No CVA tenderness. No HSM    PERIPHERAL VASCULAR:   Upper extremity:Bilateral upper extremities normal temperature with pink nail beds, no ulcerations, pulses normal.    Lower extremity: Bilateral upper extremities normal temperature with pink nail beds, no ulcerations, pulses normal.  Pedal Hair intact    PSYCHIATRIC: Appropriate mood, affect. Articulates well, with normal speech/language.  No evidence of hallucinations, delusions, obsessions, no suicidal or homicidal ideations    NEUROLOGICAL:  Moves all 4 extremities symmetrically. Symmetrical face and body posture. Cranial nerves- individually evaluated II-XII and intact. PERRLA, Normal EOMI, visual/special senses appear intact, Face is symmetrical and normal sensation/movement, normal tongue, normal strength/posture of neck musculature.     MUSCULOSKELETAL:  No generalized swelling or edema of extremities, no digital clubbing or cyanosis, neurovascularly intact all four extremities. Moves all 4.    LYMPHATIC:   Head and Neck: normal, size, non-tender.  No overlying skin abnormalities.      /84  Pulse 74  Temp 98.2 °F (36.8 °C)  Resp 16  Ht 72\" (182.9 cm)  Wt (!) 319 lb (145 kg)  SpO2 97%  BMI 43.26 kg/m2    ASSESSMENT:   Danny was seen today for weight loss and uri.    Diagnoses and all orders for this visit:    Acute non-recurrent maxillary sinusitis  -     amoxicillin-clavulanate (AUGMENTIN) 875-125 MG per tablet; Take 1 tablet by mouth 2 (Two) Times a Day for 7 days.  -     predniSONE (DELTASONE) 20 MG tablet; Take 1 tablet by mouth Daily for 5 days.    Morbid obesity with BMI of 40.0-44.9, adult  -     naltrexone-bupropion ER (CONTRAVE) 8-90 MG tablet; Take 2 tablets by mouth 2 (Two) Times a Day.         Obesity Plan:    [x] The patient BMI is outside this range and we recommended/discussed today to        utilize a diet/exercise program to " get back into the appropriate range.  [x] Federal guidelines recommend that people under the age of 65 should have a       BMI of 18.5-24.9  [x] The initial step is to document everything that is consumed into a food        diary. Studies have shown that        patients can lose up to 2x the weight by keeping track of foods.   [x] Choose one bad food weekly and eliminate it from your diet.  Replace with one        healthy food  [x] Eat more home prepared meals with the goal of eliminating fast foods  [x] Eat with the family at the table at least 5 times a week   [x] Consume a healthy breakfast daily   [x] Goal over next 2-4 weeks is walk 30 minutes per day 5 days per week at pace            difficult to hold conversation.   [x] Drink more water, less soda.   [x] Cut back on portion sizes.   [x] Today we encouraged roughly a 1 lb per week weight loss with initial goal of 5%       weight loss.  [x]  Discussed if eating out for a meal, consider cutting food in half and placing into a      to go container.  Individually        portion any foods coming into the home based on package.  [x]  Use smaller plates  [x]  Drinking 12 oz of water 30 minutes before meal as way to suppress appetite.  [x]  Medications discussed today include metformin, topamax, phentermine, Qsymia,      Belviq (lorcaserin),        Contrave (Buproprion/naltrexone).    [x]  Nutritional counseling and supervised diet visit monthly x 3 months  [x]  Lifestyle therapy   a. Simple advice to lose weight   b. Internet programs or self help books.    c. Advice from dietitian  d. Set Goals that are realistic   [x]  Encouraged to use visual aides to help in measuring foods  a. Baseball-1 cup good for green salad, frozen yogurt, medium piece of fruit, baked potato  b. Handful - ½ cup good cut fruit, cooked vegetables, pasta, rice  c. Egg- ¼ cup good for dried fruit  d. Deck of cards-3 ounces good for meat and poultry  e. Check book-3 ounces good for grilled  fish  f. 6 dice side by side- 1 ½ ounces good for natural cheese  [x]  Simple tips   a. Plate method-reduce plate size to 9 inch dinner plate.  Half of plate should be filled with non-starchy vegetables (broccoli, lettuce, cauliflower, tomatoes), ¼ plate with lean source of protein (lean chicken, turkey, fish), remaining ½ with whole grains (brown rice, potato, whole grain breads  b. Avoid liquid calories (regular soda, juice, coffee with cream)  c. Focus  on water, seltzer water and other non-calorie drinks  d. Replace regular sugar with non-caloric sweeteners  e. Avoid skipping meals: plan small regular meals throughout the day in order to keep your hunger controlled  f. Consider using meal replacements if unable to plan a healthy meal (protein shake, high protein bar)  g. Replace all white bread with whole wheat/whole grain alternative  h. Swap regular salad dressings (mayonnaise, butter, or low fat or fat free alternative)  i. Avoid high fat, high calorie, high carbohydrate snakes (cookies, pastries, cakes)  j. Snack on fruits, low fat dairy (yogurt, cottage cheese)  [x]  Behavioral Modification  a. Self-Monitoring of dietary Intake-keep a dietary intake log. Obese individuals have been shown to underestimate their food intake  b. Behavioral treatment -gives exacts about amount and total calories  c. Read food labels       [x] R/B/A of medications/treatment options d/w  the pt/family today. The patient/family are aware and accept that medications can have side effects.  If there any obvious side effects they should call or return to clinic as soon as possible.  Appropriate f/u discussed for topics addressed today. All questions were answered to the satisfactory state of patient/family.  Should symptoms fail to improve or worsen they agree to call or return to clinic or to go to the ER. Education handouts were offered on any new Rx if requested.  Discussed the importance of f/u with any needed screening  tests/labs/specialist appointments and any requested follow-up recommended by me today.  Importance of maintaining f/u discussed and patient accepts that missed appointments can delay diagnosis and potentially lead to worsening of conditions.    [x]  Medications as listed     Ronit Mccabe, YURIY, APRN-BC  09/26/2017

## 2017-09-26 NOTE — PATIENT INSTRUCTIONS
Obesity Plan:    [x] The patient BMI is outside this range and we recommended/discussed today to        utilize a diet/exercise program to get back into the appropriate range.  [x] Federal guidelines recommend that people under the age of 65 should have a       BMI of 18.5-24.9  [x] The initial step is to document everything that is consumed into a food        diary. Studies have shown that        patients can lose up to 2x the weight by keeping track of foods.   [x] Choose one bad food weekly and eliminate it from your diet.  Replace with one        healthy food  [x] Eat more home prepared meals with the goal of eliminating fast foods  [x] Eat with the family at the table at least 5 times a week   [x] Consume a healthy breakfast daily   [x] Goal over next 2-4 weeks is walk 30 minutes per day 5 days per week at pace            difficult to hold conversation.   [x] Drink more water, less soda.   [x] Cut back on portion sizes.   [x] Today we encouraged roughly a 1 lb per week weight loss with initial goal of 5%       weight loss.  [x]  Discussed if eating out for a meal, consider cutting food in half and placing into a      to go container.  Individually        portion any foods coming into the home based on package.  [x]  Use smaller plates  [x]  Drinking 12 oz of water 30 minutes before meal as way to suppress appetite.  [x]  Medications discussed today include metformin, topamax, phentermine, Qsymia,      Belviq (lorcaserin),        Contrave (Buproprion/naltrexone).    [x]  Nutritional counseling and supervised diet visit monthly x 3 months  [x]  Lifestyle therapy   a. Simple advice to lose weight   b. Internet programs or self help books.    c. Advice from dietitian  d. Set Goals that are realistic   [x]  Encouraged to use visual aides to help in measuring foods  a. Baseball-1 cup good for green salad, frozen yogurt, medium piece of fruit, baked potato  b. Handful - ½ cup good cut fruit, cooked vegetables, pasta,  rice  c. Egg- ¼ cup good for dried fruit  d. Deck of cards-3 ounces good for meat and poultry  e. Check book-3 ounces good for grilled fish  f. 6 dice side by side- 1 ½ ounces good for natural cheese  [x]  Simple tips   a. Plate method-reduce plate size to 9 inch dinner plate.  Half of plate should be filled with non-starchy vegetables (broccoli, lettuce, cauliflower, tomatoes), ¼ plate with lean source of protein (lean chicken, turkey, fish), remaining ½ with whole grains (brown rice, potato, whole grain breads  b. Avoid liquid calories (regular soda, juice, coffee with cream)  c. Focus  on water, seltzer water and other non-calorie drinks  d. Replace regular sugar with non-caloric sweeteners  e. Avoid skipping meals: plan small regular meals throughout the day in order to keep your hunger controlled  f. Consider using meal replacements if unable to plan a healthy meal (protein shake, high protein bar)  g. Replace all white bread with whole wheat/whole grain alternative  h. Swap regular salad dressings (mayonnaise, butter, or low fat or fat free alternative)  i. Avoid high fat, high calorie, high carbohydrate snakes (cookies, pastries, cakes)  j. Snack on fruits, low fat dairy (yogurt, cottage cheese)  [x]  Behavioral Modification  a. Self-Monitoring of dietary Intake-keep a dietary intake log. Obese individuals have been shown to underestimate their food intake  b. Behavioral treatment -gives exacts about amount and total calories  c. Read food labels    Obesity, Adult  Obesity is the condition of having too much total body fat. Being overweight or obese means that your weight is greater than what is considered healthy for your body size. Obesity is determined by a measurement called BMI. BMI is an estimate of body fat and is calculated from height and weight. For adults, a BMI of 30 or higher is considered obese.  Obesity can eventually lead to other health concerns and major illnesses,  including:  · Stroke.  · Coronary artery disease (CAD).  · Type 2 diabetes.  · Some types of cancer, including cancers of the colon, breast, uterus, and gallbladder.  · Osteoarthritis.  · High blood pressure (hypertension).  · High cholesterol.  · Sleep apnea.  · Gallbladder stones.  · Infertility problems.   CAUSES  The main cause of obesity is taking in (consuming) more calories than your body uses for energy. Other factors that contribute to this condition may include:  · Being born with genes that make you more likely to become obese.  · Having a medical condition that causes obesity. These conditions include:    Hypothyroidism.    Polycystic ovarian syndrome (PCOS).    Binge-eating disorder.    Cushing syndrome.  · Taking certain medicines, such as steroids, antidepressants, and seizure medicines.  · Not being physically active (sedentary lifestyle).  · Living where there are limited places to exercise safely or buy healthy foods.  · Not getting enough sleep.  RISK FACTORS  The following factors may increase your risk of this condition:  · Having a family history of obesity.  · Being a woman of -American descent.  · Being a man of  descent.  SYMPTOMS  Having excessive body fat is the main symptom of this condition.  DIAGNOSIS  This condition may be diagnosed based on:  · Your symptoms.  · Your medical history.  · A physical exam. Your health care provider may measure:    Your BMI. If you are an adult with a BMI between 25 and less than 30, you are considered overweight. If you are an adult with a BMI of 30 or higher, you are considered obese.    The distances around your hips and your waist (circumferences). These may be compared to each other to help diagnose your condition.    Your skinfold thickness. Your health care provider may gently pinch a fold of your skin and measure it.  TREATMENT  Treatment for this condition often includes changing your lifestyle. Treatment may include some or all of  the following:  · Dietary changes. Work with your health care provider and a dietitian to set a weight-loss goal that is healthy and reasonable for you. Dietary changes may include eating:    Smaller portions. A portion size is the amount of a particular food that is healthy for you to eat at one time. This varies from person to person.    Low-calorie or low-fat options.    More whole grains, fruits, and vegetables.  · Regular physical activity. This may include aerobic activity (cardio) and strength training.  · Medicine to help you lose weight. Your health care provider may prescribe medicine if you are unable to lose 1 pound a week after 6 weeks of eating more healthily and doing more physical activity.  · Surgery. Surgical options may include gastric banding and gastric bypass. Surgery may be done if:    Other treatments have not helped to improve your condition.    You have a BMI of 40 or higher.    You have life-threatening health problems related to obesity.  HOME CARE INSTRUCTIONS  Eating and Drinking  · Follow recommendations from your health care provider about what you eat and drink. Your health care provider may advise you to:    Limit fast foods, sweets, and processed snack foods.    Choose low-fat options, such as low-fat milk instead of whole milk.    Eat 5 or more servings of fruits or vegetables every day.    Eat at home more often. This gives you more control over what you eat.    Choose healthy foods when you eat out.    Learn what a healthy portion size is.    Keep low-fat snacks on hand.    Avoid sugary drinks, such as soda, fruit juice, iced tea sweetened with sugar, and flavored milk.    Eat a healthy breakfast.  · Drink enough water to keep your urine clear or pale yellow.  · Do not go without eating for long periods of time (do not fast) or follow a fad diet. Fasting and fad diets can be unhealthy and even dangerous.  Physical Activity  · Exercise regularly, as told by your health care  provider. Ask your health care provider what types of exercise are safe for you and how often you should exercise.  · Warm up and stretch before being active.  · Cool down and stretch after being active.  · Rest between periods of activity.  Lifestyle  · Limit the time that you spend in front of your TV, computer, or video game system.  · Find ways to reward yourself that do not involve food.  · Limit alcohol intake to no more than 1 drink a day for nonpregnant women and 2 drinks a day for men. One drink equals 12 oz of beer, 5 oz of wine, or 1½ oz of hard liquor.  General Instructions  · Keep a weight loss journal to keep track of the food you eat and how much you exercise you get.  · Take over-the-counter and prescription medicines only as told by your health care provider.  · Take vitamins and supplements only as told by your health care provider.  · Consider joining a support group. Your health care provider may be able to recommend a support group.  · Keep all follow-up visits as told by your health care provider. This is important.  SEEK MEDICAL CARE IF:  · You are unable to meet your weight loss goal after 6 weeks of dietary and lifestyle changes.     This information is not intended to replace advice given to you by your health care provider. Make sure you discuss any questions you have with your health care provider.     Document Released: 01/25/2006 Document Revised: 04/10/2017 Document Reviewed: 10/05/2016  "[employer] Interactive Patient Education ©2017 "[employer] Inc.

## 2017-10-02 ENCOUNTER — TELEPHONE (OUTPATIENT)
Dept: FAMILY MEDICINE CLINIC | Facility: CLINIC | Age: 47
End: 2017-10-02

## 2017-10-02 DIAGNOSIS — J40 BRONCHITIS: ICD-10-CM

## 2017-10-02 DIAGNOSIS — J01.01 ACUTE RECURRENT MAXILLARY SINUSITIS: Primary | ICD-10-CM

## 2017-10-02 RX ORDER — DEXTROMETHORPHAN HYDROBROMIDE AND PROMETHAZINE HYDROCHLORIDE 15; 6.25 MG/5ML; MG/5ML
5 SYRUP ORAL 4 TIMES DAILY PRN
Qty: 120 ML | Refills: 0 | Status: SHIPPED | OUTPATIENT
Start: 2017-10-02 | End: 2017-12-06

## 2017-10-02 RX ORDER — AZITHROMYCIN 250 MG/1
TABLET, FILM COATED ORAL
Qty: 6 TABLET | Refills: 0 | Status: SHIPPED | OUTPATIENT
Start: 2017-10-02 | End: 2017-10-02 | Stop reason: SDUPTHER

## 2017-10-02 RX ORDER — AZITHROMYCIN 250 MG/1
TABLET, FILM COATED ORAL
Qty: 6 TABLET | Refills: 0 | Status: SHIPPED | OUTPATIENT
Start: 2017-10-02 | End: 2017-12-06

## 2017-10-02 NOTE — TELEPHONE ENCOUNTER
Patient was seen 9/26 for sinusitis, given steroid and amoxicillin. He states it has gotten worse now with a bad cough. Requesting something else or he said he would come back in for a visit.

## 2017-10-02 NOTE — TELEPHONE ENCOUNTER
I sent a zpack and promethazine DM to [facility] for him.  I tried to call him to let him know but there was no answer

## 2017-11-22 ENCOUNTER — OFFICE VISIT (OUTPATIENT)
Dept: GASTROENTEROLOGY | Age: 47
End: 2017-11-22
Payer: COMMERCIAL

## 2017-11-22 VITALS
HEART RATE: 102 BPM | DIASTOLIC BLOOD PRESSURE: 80 MMHG | WEIGHT: 309 LBS | BODY MASS INDEX: 41.85 KG/M2 | SYSTOLIC BLOOD PRESSURE: 132 MMHG | OXYGEN SATURATION: 97 % | HEIGHT: 72 IN

## 2017-11-22 DIAGNOSIS — K21.9 CHRONIC GERD: ICD-10-CM

## 2017-11-22 DIAGNOSIS — K22.70 BARRETT'S ESOPHAGUS DETERMINED BY BIOPSY: Primary | Chronic | ICD-10-CM

## 2017-11-22 PROCEDURE — 99214 OFFICE O/P EST MOD 30 MIN: CPT | Performed by: NURSE PRACTITIONER

## 2017-11-22 RX ORDER — RABEPRAZOLE SODIUM 20 MG/1
20 TABLET, DELAYED RELEASE ORAL DAILY
Qty: 90 TABLET | Refills: 3 | Status: SHIPPED | OUTPATIENT
Start: 2017-11-22 | End: 2019-07-08 | Stop reason: SDUPTHER

## 2017-11-22 ASSESSMENT — ENCOUNTER SYMPTOMS
CHEST TIGHTNESS: 0
VOICE CHANGE: 0
BACK PAIN: 0
SHORTNESS OF BREATH: 0
ABDOMINAL DISTENTION: 0
NAUSEA: 0
COUGH: 0
CONSTIPATION: 0
SORE THROAT: 0
DIARRHEA: 0
VOMITING: 0
RECTAL PAIN: 0
ABDOMINAL PAIN: 0
BLOOD IN STOOL: 0

## 2017-11-22 NOTE — PATIENT INSTRUCTIONS
Schedule endoscopy  Nothing to eat or drink after midnight. You will not be able to drive for 24 hours after the procedure due to sedation. Bring a  with you the day of the procedure. No aspirin, ibuprofen, naproxen, fish oil or vitamin E for 5 days before procedure. Continue current medications. If you are on blood thinners, clearance from the prescribing physician will be obtained before your procedure is scheduled. If biopsies are taken during the procedure they will be sent to a pathologist for analysis. You will be notified by mail of the pathology results in 2-3 weeks. Your physician may also schedule a follow up appointment with the nurse practitioner to discuss pathology, symptoms or to check if you have had any problems related to your procedure. If you prefer not to return to the office after your procedure please discuss this with your physician on the day of your procedure.

## 2017-11-22 NOTE — PROGRESS NOTES
Subjective:      Patient ID: Nikunj Lozada is a 52 y.o. male. PCP: Fadi Lockett NP     [facility]    Chief Complaint   Patient presents with    Endoscopy     recall    Gastroesophageal Reflux       Patient with long segment Garner's. Due for annual surveillance EGD & biopsies. The patient denies abdominal or flank pain, anorexia, nausea or vomiting, dysphagia, change in bowel habits or black or bloody stools or weight loss. He takes Aciphex 20mg daily with good control of reflux. He states certain foods or late night eating will cause him some problems. He has used otc omeprazole 20mg as second dose prn.          Family History   Problem Relation Age of Onset    High Blood Pressure Mother     High Blood Pressure Father     High Cholesterol Father     Liver Cancer Maternal Grandfather     Lung Cancer Paternal Grandfather     Colon Cancer Neg Hx     Colon Polyps Neg Hx     Esophageal Cancer Neg Hx        Past Medical History:   Diagnosis Date    Garner's esophagus     GERD (gastroesophageal reflux disease)     Weight loss        Past Surgical History:   Procedure Laterality Date    APPENDECTOMY      UPPER GASTROINTESTINAL ENDOSCOPY  8/1/2011        UPPER GASTROINTESTINAL ENDOSCOPY  8/1/2012        UPPER GASTROINTESTINAL ENDOSCOPY  10/7/10    Dr Lynn Barba ENDOSCOPY  9-3-10    Dr Lynn Barba ENDOSCOPY  11/24/2014    Dr Gaines Goodell: long segment Garner's surveillance, biopsies neg for dysplasia    UPPER GASTROINTESTINAL ENDOSCOPY  11/2015    long segment Garner's surveillance, biopsies neg for dysplasia    UPPER GASTROINTESTINAL ENDOSCOPY  12/21/2016    Dr Gaines Goodell [facility])-hiatal hernia, (+) Long seg Garner's (from 23 cm to 30 cm, not 34-36 cm), (-) dysplasia--1 yr recall and referral to [facility] for ablation therapy       Current Outpatient Prescriptions   Medication Sig Dispense Refill    naltrexone-bupropion (Tonya Hernandez) 8-90 MG per extended release tablet Take 2 tablets by mouth      RABEprazole (ACIPHEX) 20 MG tablet Take 1 tablet by mouth daily 90 tablet 3    Omega-3 Fatty Acids (FISH OIL) 1000 MG CAPS Take 3,000 mg by mouth 3 times daily      Glucosamine 500 MG CAPS Take by mouth      lisinopril (PRINIVIL;ZESTRIL) 10 MG tablet       Probiotic Product (PROBIOTIC DAILY PO) Take 1 capsule by mouth daily      aspirin 81 MG EC tablet Take 81 mg by mouth daily.  therapeutic multivitamin-minerals (THERAGRAN-M) tablet Take 1 tablet by mouth daily.  Psyllium (METAMUCIL PO) Take  by mouth 2 times daily. No current facility-administered medications for this visit. No Known Allergies     reports that he has never smoked. He has never used smokeless tobacco. He reports that he drinks alcohol. He reports that he does not use drugs. Review of Systems   Constitutional: Negative for appetite change, fever and unexpected weight change. HENT: Negative for sore throat and voice change. Respiratory: Negative for cough, chest tightness and shortness of breath. Cardiovascular: Negative for chest pain, palpitations and leg swelling. Gastrointestinal: Negative for abdominal distention, abdominal pain, blood in stool, constipation, diarrhea, nausea, rectal pain and vomiting. Reflux   Musculoskeletal: Negative for arthralgias, back pain and gait problem. Skin: Negative for pallor, rash and wound. Neurological: Negative for dizziness, weakness and light-headedness. Hematological: Negative for adenopathy. Does not bruise/bleed easily. All other systems reviewed and are negative. Objective:   Physical Exam   Constitutional: He is oriented to person, place, and time. He appears well-developed and well-nourished. No distress. /80   Pulse 102   Ht 6' (1.829 m)   Wt (!) 309 lb (140.2 kg)   SpO2 97%   BMI 41.91 kg/m²      Eyes: Conjunctivae are normal. No scleral icterus.    Neck: No indicated. Also recommend at least annual serology for monitoring kidney function. Consider alternative acid control if any signs of declining kidney function.

## 2017-12-06 ENCOUNTER — OFFICE VISIT (OUTPATIENT)
Dept: FAMILY MEDICINE CLINIC | Facility: CLINIC | Age: 47
End: 2017-12-06

## 2017-12-06 VITALS
DIASTOLIC BLOOD PRESSURE: 84 MMHG | HEIGHT: 72 IN | OXYGEN SATURATION: 98 % | BODY MASS INDEX: 42.66 KG/M2 | SYSTOLIC BLOOD PRESSURE: 136 MMHG | TEMPERATURE: 98.2 F | HEART RATE: 92 BPM | WEIGHT: 315 LBS

## 2017-12-06 DIAGNOSIS — I10 ESSENTIAL HYPERTENSION: ICD-10-CM

## 2017-12-06 PROCEDURE — 99213 OFFICE O/P EST LOW 20 MIN: CPT | Performed by: NURSE PRACTITIONER

## 2017-12-06 RX ORDER — LISINOPRIL 10 MG/1
10 TABLET ORAL DAILY
Qty: 90 TABLET | Refills: 1 | Status: SHIPPED | OUTPATIENT
Start: 2017-12-06 | End: 2018-06-22 | Stop reason: SDUPTHER

## 2017-12-06 NOTE — PROGRESS NOTES
Chief Complaint   Patient presents with   • Hypertension        No Known Allergies    HPI:  Danny Vega is a 47 y.o. male presents today for follow up of HTN that is stable with lisinopril..  Borderline hyperlipidemia stable with omega 3 fatty acids, just had labs drawn at work will bring them back to review.  Has GERD stable with aciphex and says that the GI wants him to have an EGD he is going to schedule.  He is on the contrave and said he was losing weight until he went on mandatory overtime.  Said he will get back after the weight loss after xmas.  No shortness of breath or chest pain noted, bp has been running good.       Past Medical History:   Diagnosis Date   • GERD (gastroesophageal reflux disease)    • Hyperlipidemia    • Hypertension      Past Surgical History:   Procedure Laterality Date   • APPENDECTOMY       Social History     Social History   • Marital status:      Spouse name: N/A   • Number of children: N/A   • Years of education: N/A     Social History Main Topics   • Smoking status: Never Smoker   • Smokeless tobacco: Never Used   • Alcohol use No   • Drug use: No   • Sexual activity: Defer     Other Topics Concern   • None     Social History Narrative     Family History   Problem Relation Age of Onset   • Hypertension Mother    • Hypertension Father    • No Known Problems Sister    • No Known Problems Daughter    • Diabetes Maternal Grandmother    • Hypertension Maternal Grandmother    • Dementia Maternal Grandmother    • Cancer Maternal Grandfather    • Diabetes Paternal Grandmother    • Cancer Paternal Grandfather        Current Outpatient Prescriptions on File Prior to Visit   Medication Sig Dispense Refill   • aspirin 81 MG EC tablet Take 81 mg by mouth Daily.     • glucosamine sulfate 500 MG capsule capsule Take  by mouth Daily.     • lisinopril (PRINIVIL,ZESTRIL) 10 MG tablet Take 1 tablet by mouth Daily. 90 tablet 1   • Multiple Vitamins-Minerals (MULTIVITAMIN PO) Take  by  "mouth Daily.     • naltrexone-bupropion ER (CONTRAVE) 8-90 MG tablet Take 2 tablets by mouth 2 (Two) Times a Day. 360 tablet 1   • Omega-3 Fatty Acids (FISH OIL) 1000 MG capsule capsule Take  by mouth Daily.     • Probiotic Product (PROBIOTIC DAILY PO) Take  by mouth Daily.     • RABEprazole (ACIPHEX) 20 MG EC tablet Take 1 tablet by mouth Daily. 90 tablet 1   • [DISCONTINUED] azithromycin (ZITHROMAX) 250 MG tablet Take 2 tablets the first day, then 1 tablet daily for 4 days. 6 tablet 0   • [DISCONTINUED] promethazine-dextromethorphan (PROMETHAZINE-DM) 6.25-15 MG/5ML syrup Take 5 mL by mouth 4 (Four) Times a Day As Needed for Cough. 120 mL 0     No current facility-administered medications on file prior to visit.         REVIEW OF SYMPTOMS: (Positives bolded) all negative today  General:  weight loss, fever, chills, night sweats, fatigue, appetite loss  HEENT:  blurry vision, eye pain, eye discharge, dry eyes, decreased vision  Respiratory: shortness of breath, cough, hemoptysis, wheezing, pleurisy,   Cardiovascular:  chest pain, PND, palpitation, edema, orthopnea, syncope, swelling of extremities  Gastro: Nausea, vomiting, diarrhea, hematemesis, abdominal pain, constipation  Genito: hematuria, dysuria, glycosuria, hesitancy, frequency, incontinence  Musckelo: Arthralgia, myalgia, muscle weakness, joint swelling, NSAID use  Skin: rash, pruritis, sores, nail changes, skin thickening, change in wart/mole, itching, rash, new lesions, pruritus, nail changes  Neuro:  Migraine, numbness, ataxia, tremor, vertigo, weakness, memory loss,  \"All other systems reviewed and negative, except as listed above.”      OBJECTIVE:  Constitutional:  Appearance-No acute distress, Consistent with stated age. Orientation- Oriented x 3, alert Posture-Not doubled over. Gait-Normal pace, normal arm movement. Posture- Normal Build and Nutrition-Well developed and well nourished.  General- Patient is pleasant and cooperative with the interview " and exam.    Integumentary: General-No rashes, ulcers or lesions. No edema.  Palpation- Normal skin moisture/turgor. Skin is warm to touch, no increased warmth. Capillary refill is normal bilateral Upper and lower extremity.     Head/Neck: Head- normocephalic and atraumatic.  Neck- without visible/palpable lumps or pulsations.  Palpation- No bony tenderness about head/neck along frontal, occiptial, temporal, parietal, mastoid, jawline, zygoma, orbit or any other location.  NO temporal artery tenderness. No TMJ tenderness. Normal cervical ROM.   Neck Supple.  Thyroid-No thyromegaly, no nodules    Eye: Bilaterally PERRLA, EOMI.  No discharge.  Upper and lower eyelids are normal. Sclera/conjunctiva normal without discharge. Cornea is normal and clear. Lens is normal.  Eyeball appears normal. No ciliary flushing, no conjunctival injection.    ENMT:  Pinna- normal without tenderness or erythema.  External auditory canal Left- normal without erythema or discharge, no excessive cerumen. External auditory canal Right-normal without erythema or discharge, no excessive cerumen. TM left- Grey/pearly, normal light reflex and anatomy TM Right- Grey/pearly, normal light reflex and anatomy Hearing Assessment-normal to conversational speech at 2-5 feet.  Nose and sinus-No sinus tenderness along frontal/maxillary region. External appearance normal and midline. Nares- bilateral quiet airflow, no discharge. Nasal mucosa- No bleeding noted and no ulcerations observed. Pink, moist. Turbinates non boggy. Lips- normal color, moist without cracks/lesions Oral Cavity/Palate- hard/soft palate intact without lesions, oral mucosa pink and moist. Dentition assessed and discussed appropriate oral care. Tongue normal midline.  Oropharynx- no pharyngeal erythema, Uvula midline. No post nasal drip. No exudate. Salivary glands- Non tender to palpation    CHEST/LUNG: Inspection- symmetric chest wall no pectus deformity. Normal effort, no distress, no  use of accessory muscles. Palpation- nontender sternum, ribline.  No abnormal pulsations. Auscultation- Breath sounds normal throughout all lung fields.  Normal tracheal sounds, Normal bronchial sounds overlying sternum, Bronchovessicular sounds normal between scapulae posteriorly, Normal vessicular breath sounds heard throughout periphery. Lungs are clear today. Adventitious sounds- No wheezes, rales, rhonchi.     CARDIOVASCULAR:  Carotid artery- normal, no bruits or abnormal pulsations. Jugular vein- no pulsations. Palpation/Percussion- Normal PMI, no palpable thrill  Auscultation- Regular rate and rhythm. No murmur noted in sitting, supine positions. Extremities- no digital clubbing, cyanosis, edema, increased warmth.    ABDOMEN: Inspection- normal and no visible pulsations. Normal contour. Auscultation- Bowel sounds normal, no abdominal bruits. Palpation/Percussion- soft, non-tender, no rebound tenderness, no rigidity (guarding), no jar tenderness, no masses.  Liver-no hepatomegaly, Spleen - no splenomegaly, Hernias- none. Rectal not examined.     Peripheral Vascular: Upper extremity Left- Normal temperature with pink nailbeds and no ulcerations.  Upper extremity Right- Normal temperature with pink nailbeds and no ulcerations.  Lower extremity- Normal temperature with pink nailbeds and no ulcerations. DP pulses 2+ bilaterally.  Pedal hair intact.  Normal capillary refill. Edema- No edema.    Neuropsych: Oriented- Person, place, time. (AAOx3), Mood/affect- normal and congruent. Able to articulate well. Speech-Normal speech, normal rate, normal tone, normal use of language, volume and coherence.  Thought content- normal with ability to perform basic computations and apply abstract thought/reason. Associations- intact, no SI/HI, no hallucinations, delusions, obsessions.  Judgment/insight- Appropriate. Memory-Recall intact, remote and recent memory intact. Knowledge- Age appropriate fund of knowledge, concentration  and attention span normal.    Lymphatic: Head/Neck- normal size and non tender to palpation. Axillary- Head and neck LN are normal size and non tender to palpation. Femoral and Inguinal- normal size and non tender to palpation.      Assessment/Plan:  Danny was seen today for hypertension.    Diagnoses and all orders for this visit:    Essential hypertension  -     lisinopril (PRINIVIL,ZESTRIL) 10 MG tablet; Take 1 tablet by mouth Daily.      1. Obesity: Federal guidelines recommend that people under the age of 65 should have a BMI of 18.5-24.9 and people age 65 and older should have a BMI of 23-30. The patient BMI 43.7 is outside this range and we recommended/discussed today to utilize a diet/exercise program to get back into the appropriate range.  Today we encouraged roughly a 1 lb per week weight loss with initial goal of 5% weight loss. The initial step is to document everything that is consumed into a food diary.  Studies have shown that patients can lose up to 2x the weight by keeping track of foods.     a. Discussed if eating out for a meal, consider cutting food in half and placing into to-go container.  Individually portion any foods coming into the home based on package.    b. Consider using smaller plates.    c. Consider drinking 12 oz of water 30 minutes before meal as way to suppress appetite.   d. Cut back on soft drinks/juices.  Discussed 1 can of regular soft drink has roughly 150-170 Calories per day.    e. Increase exercise as able. It is recommended to try to exercise minimum 10 minutes 5 days per week.  The goal over the next 2-4 weeks is to walk 30 minutes per day 5 days per week at pace difficult to hold conversation.   f. Would like to see back in roughly 3 months.      Return in about 6 months (around 6/6/2018), or if symptoms worsen or fail to improve.    Ronit Mccabe, DNP, APRN-BC  12/06/2017

## 2017-12-06 NOTE — PATIENT INSTRUCTIONS

## 2017-12-20 PROCEDURE — 88305 TISSUE EXAM BY PATHOLOGIST: CPT | Performed by: INTERNAL MEDICINE

## 2017-12-21 ENCOUNTER — LAB REQUISITION (OUTPATIENT)
Dept: LAB | Facility: HOSPITAL | Age: 47
End: 2017-12-21

## 2017-12-21 DIAGNOSIS — Z00.00 ENCOUNTER FOR GENERAL ADULT MEDICAL EXAMINATION WITHOUT ABNORMAL FINDINGS: ICD-10-CM

## 2017-12-26 LAB
LAB AP CASE REPORT: NORMAL
LAB AP CLINICAL INFORMATION: NORMAL
Lab: NORMAL
PATH REPORT.FINAL DX SPEC: NORMAL
PATH REPORT.GROSS SPEC: NORMAL

## 2018-01-02 ENCOUNTER — TELEPHONE (OUTPATIENT)
Dept: GASTROENTEROLOGY | Age: 48
End: 2018-01-02

## 2018-06-04 DIAGNOSIS — I10 ESSENTIAL HYPERTENSION: ICD-10-CM

## 2018-06-06 RX ORDER — LISINOPRIL 10 MG/1
TABLET ORAL
Qty: 90 TABLET | Refills: 1 | OUTPATIENT
Start: 2018-06-06

## 2018-06-18 NOTE — TELEPHONE ENCOUNTER
I have called [facility] to get the pt scheduled, but I had to leave a message for [facility] to call me back about scheduling. Then number I have called is [phone].  LM

## 2018-06-19 NOTE — TELEPHONE ENCOUNTER
6-19-18 Helena Barnett from [facility], [facility]. has returned a call to [place] from [phone].  [facility]    8:58 AM

## 2018-06-21 NOTE — TELEPHONE ENCOUNTER
I have sent all records to Dr. Sutherland Pert office, so she can review them. They will call me back once she has reviewed them to set the date and time of pt's appt. I have faxed the records to ATTN: Luci at [phone].  LM

## 2018-06-21 NOTE — TELEPHONE ENCOUNTER
6-21-18 12:59 PM Message for Thelma Quezada from 's office called from [phone] said they got all the records but the [facility] 83 and path report back in December is very fuzzy and they cannot read at all, she is asking you to fax this again to [phone], she said sorry to bother you with this again.   meggan

## 2018-06-22 ENCOUNTER — OFFICE VISIT (OUTPATIENT)
Dept: FAMILY MEDICINE CLINIC | Facility: CLINIC | Age: 48
End: 2018-06-22

## 2018-06-22 VITALS
RESPIRATION RATE: 18 BRPM | WEIGHT: 312 LBS | SYSTOLIC BLOOD PRESSURE: 126 MMHG | OXYGEN SATURATION: 97 % | BODY MASS INDEX: 42.26 KG/M2 | HEIGHT: 72 IN | HEART RATE: 79 BPM | DIASTOLIC BLOOD PRESSURE: 88 MMHG | TEMPERATURE: 99 F

## 2018-06-22 DIAGNOSIS — R94.31 ABNORMAL EKG: ICD-10-CM

## 2018-06-22 DIAGNOSIS — E78.2 MIXED HYPERLIPIDEMIA: ICD-10-CM

## 2018-06-22 DIAGNOSIS — I10 ESSENTIAL HYPERTENSION: Primary | ICD-10-CM

## 2018-06-22 DIAGNOSIS — E66.01 OBESITY, MORBID (HCC): ICD-10-CM

## 2018-06-22 DIAGNOSIS — K21.9 GASTROESOPHAGEAL REFLUX DISEASE WITHOUT ESOPHAGITIS: ICD-10-CM

## 2018-06-22 PROCEDURE — 99214 OFFICE O/P EST MOD 30 MIN: CPT | Performed by: NURSE PRACTITIONER

## 2018-06-22 PROCEDURE — 93000 ELECTROCARDIOGRAM COMPLETE: CPT | Performed by: NURSE PRACTITIONER

## 2018-06-22 RX ORDER — CHLORAL HYDRATE 500 MG
2000 CAPSULE ORAL
Qty: 180 CAPSULE | Refills: 5 | Status: SHIPPED | OUTPATIENT
Start: 2018-06-22 | End: 2018-12-12

## 2018-06-22 RX ORDER — LISINOPRIL 10 MG/1
10 TABLET ORAL DAILY
Qty: 90 TABLET | Refills: 1 | Status: SHIPPED | OUTPATIENT
Start: 2018-06-22 | End: 2018-12-12 | Stop reason: SDUPTHER

## 2018-06-22 RX ORDER — RABEPRAZOLE SODIUM 20 MG/1
20 TABLET, DELAYED RELEASE ORAL DAILY
Qty: 90 TABLET | Refills: 1 | Status: SHIPPED | OUTPATIENT
Start: 2018-06-22 | End: 2018-12-12 | Stop reason: SDUPTHER

## 2018-06-22 NOTE — PATIENT INSTRUCTIONS
Obesity, Adult  Obesity is the condition of having too much total body fat. Being overweight or obese means that your weight is greater than what is considered healthy for your body size. Obesity is determined by a measurement called BMI. BMI is an estimate of body fat and is calculated from height and weight. For adults, a BMI of 30 or higher is considered obese.  Obesity can eventually lead to other health concerns and major illnesses, including:  · Stroke.  · Coronary artery disease (CAD).  · Type 2 diabetes.  · Some types of cancer, including cancers of the colon, breast, uterus, and gallbladder.  · Osteoarthritis.  · High blood pressure (hypertension).  · High cholesterol.  · Sleep apnea.  · Gallbladder stones.  · Infertility problems.    What are the causes?  The main cause of obesity is taking in (consuming) more calories than your body uses for energy. Other factors that contribute to this condition may include:  · Being born with genes that make you more likely to become obese.  · Having a medical condition that causes obesity. These conditions include:  ? Hypothyroidism.  ? Polycystic ovarian syndrome (PCOS).  ? Binge-eating disorder.  ? Cushing syndrome.  · Taking certain medicines, such as steroids, antidepressants, and seizure medicines.  · Not being physically active (sedentary lifestyle).  · Living where there are limited places to exercise safely or buy healthy foods.  · Not getting enough sleep.    What increases the risk?  The following factors may increase your risk of this condition:  · Having a family history of obesity.  · Being a woman of -American descent.  · Being a man of  descent.    What are the signs or symptoms?  Having excessive body fat is the main symptom of this condition.  How is this diagnosed?  This condition may be diagnosed based on:  · Your symptoms.  · Your medical history.  · A physical exam. Your health care provider may measure:  ? Your BMI. If you are  an adult with a BMI between 25 and less than 30, you are considered overweight. If you are an adult with a BMI of 30 or higher, you are considered obese.  ? The distances around your hips and your waist (circumferences). These may be compared to each other to help diagnose your condition.  ? Your skinfold thickness. Your health care provider may gently pinch a fold of your skin and measure it.    How is this treated?  Treatment for this condition often includes changing your lifestyle. Treatment may include some or all of the following:  · Dietary changes. Work with your health care provider and a dietitian to set a weight-loss goal that is healthy and reasonable for you. Dietary changes may include eating:  ? Smaller portions. A portion size is the amount of a particular food that is healthy for you to eat at one time. This varies from person to person.  ? Low-calorie or low-fat options.  ? More whole grains, fruits, and vegetables.  · Regular physical activity. This may include aerobic activity (cardio) and strength training.  · Medicine to help you lose weight. Your health care provider may prescribe medicine if you are unable to lose 1 pound a week after 6 weeks of eating more healthily and doing more physical activity.  · Surgery. Surgical options may include gastric banding and gastric bypass. Surgery may be done if:  ? Other treatments have not helped to improve your condition.  ? You have a BMI of 40 or higher.  ? You have life-threatening health problems related to obesity.    Follow these instructions at home:    Eating and drinking    · Follow recommendations from your health care provider about what you eat and drink. Your health care provider may advise you to:  ? Limit fast foods, sweets, and processed snack foods.  ? Choose low-fat options, such as low-fat milk instead of whole milk.  ? Eat 5 or more servings of fruits or vegetables every day.  ? Eat at home more often. This gives you more control  over what you eat.  ? Choose healthy foods when you eat out.  ? Learn what a healthy portion size is.  ? Keep low-fat snacks on hand.  ? Avoid sugary drinks, such as soda, fruit juice, iced tea sweetened with sugar, and flavored milk.  ? Eat a healthy breakfast.  · Drink enough water to keep your urine clear or pale yellow.  · Do not go without eating for long periods of time (do not fast) or follow a fad diet. Fasting and fad diets can be unhealthy and even dangerous.  Physical Activity  · Exercise regularly, as told by your health care provider. Ask your health care provider what types of exercise are safe for you and how often you should exercise.  · Warm up and stretch before being active.  · Cool down and stretch after being active.  · Rest between periods of activity.  Lifestyle  · Limit the time that you spend in front of your TV, computer, or video game system.  · Find ways to reward yourself that do not involve food.  · Limit alcohol intake to no more than 1 drink a day for nonpregnant women and 2 drinks a day for men. One drink equals 12 oz of beer, 5 oz of wine, or 1½ oz of hard liquor.  General instructions  · Keep a weight loss journal to keep track of the food you eat and how much you exercise you get.  · Take over-the-counter and prescription medicines only as told by your health care provider.  · Take vitamins and supplements only as told by your health care provider.  · Consider joining a support group. Your health care provider may be able to recommend a support group.  · Keep all follow-up visits as told by your health care provider. This is important.  Contact a health care provider if:  · You are unable to meet your weight loss goal after 6 weeks of dietary and lifestyle changes.  This information is not intended to replace advice given to you by your health care provider. Make sure you discuss any questions you have with your health care provider.  Document Released: 01/25/2006 Document  Revised: 05/22/2017 Document Reviewed: 10/05/2016  [employer] Interactive Patient Education © 2018 [employer]      Exercising to Lose Weight  Exercising can help you to lose weight. In order to lose weight through exercise, you need to do vigorous-intensity exercise. You can tell that you are exercising with vigorous intensity if you are breathing very hard and fast and cannot hold a conversation while exercising.  Moderate-intensity exercise helps to maintain your current weight. You can tell that you are exercising at a moderate level if you have a higher heart rate and faster breathing, but you are still able to hold a conversation.  How often should I exercise?  Choose an activity that you enjoy and set realistic goals. Your health care provider can help you to make an activity plan that works for you. Exercise regularly as directed by your health care provider. This may include:  · Doing resistance training twice each week, such as:  ? Push-ups.  ? Sit-ups.  ? Lifting weights.  ? Using resistance bands.  · Doing a given intensity of exercise for a given amount of time. Choose from these options:  ? 150 minutes of moderate-intensity exercise every week.  ? 75 minutes of vigorous-intensity exercise every week.  ? A mix of moderate-intensity and vigorous-intensity exercise every week.    Children, pregnant women, people who are out of shape, people who are overweight, and older adults may need to consult a health care provider for individual recommendations. If you have any sort of medical condition, be sure to consult your health care provider before starting a new exercise program.  What are some activities that can help me to lose weight?  · Walking at a rate of at least 4.5 miles an hour.  · Jogging or running at a rate of 5 miles per hour.  · Biking at a rate of at least 10 miles per hour.  · Lap swimming.  · Roller-skating or in-line skating.  · Cross-country skiing.  · Vigorous competitive sports, such as  football, basketball, and soccer.  · Jumping rope.  · Aerobic dancing.  How can I be more active in my day-to-day activities?  · Use the stairs instead of the elevator.  · Take a walk during your lunch break.  · If you drive, park your car farther away from work or school.  · If you take public transportation, get off one stop early and walk the rest of the way.  · Make all of your phone calls while standing up and walking around.  · Get up, stretch, and walk around every 30 minutes throughout the day.  What guidelines should I follow while exercising?  · Do not exercise so much that you hurt yourself, feel dizzy, or get very short of breath.  · Consult your health care provider prior to starting a new exercise program.  · Wear comfortable clothes and shoes with good support.  · Drink plenty of water while you exercise to prevent dehydration or heat stroke. Body water is lost during exercise and must be replaced.  · Work out until you breathe faster and your heart beats faster.  This information is not intended to replace advice given to you by your health care provider. Make sure you discuss any questions you have with your health care provider.  Document Released: 01/20/2012 Document Revised: 05/25/2017 Document Reviewed: 05/21/2015  [employer] Interactive Patient Education © 2018 [employer]      MyPlate from [employer]  The general, healthful diet is based on the 2010 Dietary Guidelines for Americans. The amount of food you need to eat from each food group depends on your age, sex, and level of physical activity and can be individualized by a dietitian. Go to ChooseMyPlate.gov for more information.  What do I need to know about the MyPlate plan?  · Enjoy your food, but eat less.  · Avoid oversized portions.  ? ½ of your plate should include fruits and vegetables.  ? ¼ of your plate should be grains.  ? ¼ of your plate should be protein.  Grains  · Make at least half of your grains whole grains.  · For a 2,000 calorie  daily food plan, eat 6 oz every day.  · 1 oz is about 1 slice bread, 1 cup cereal, or ½ cup cooked rice, cereal, or pasta.  Vegetables  · Make half your plate fruits and vegetables.  · For a 2,000 calorie daily food plan, eat 2½ cups every day.  · 1 cup is about 1 cup raw or cooked vegetables or vegetable juice or 2 cups raw leafy greens.  Fruits  · Make half your plate fruits and vegetables.  · For a 2,000 calorie daily food plan, eat 2 cups every day.  · 1 cup is about 1 cup fruit or 100% fruit juice or ½ cup dried fruit.  Protein  · For a 2,000 calorie daily food plan, eat 5½ oz every day.  · 1 oz is about 1 oz meat, poultry, or fish, ¼ cup cooked beans, 1 egg, 1 Tbsp peanut butter, or ½ oz nuts or seeds.  Dairy  · Switch to fat-free or low-fat (1%) milk.  · For a 2,000 calorie daily food plan, eat 3 cups every day.  · 1 cup is about 1 cup milk or yogurt or soy milk (soy beverage), 1½ oz natural cheese, or 2 oz processed cheese.  Fats, Oils, and Empty Calories  · Only small amounts of oils are recommended.  · Empty calories are calories from solid fats or added sugars.  · Compare sodium in foods like soup, bread, and frozen meals. Choose the foods with lower numbers.  · Drink water instead of sugary drinks.  What foods can I eat?  Grains  Whole grains such as whole wheat, quinoa, millet, and bulgur. Bread, rolls, and pasta made from whole grains. Brown or wild rice. Hot or cold cereals made from whole grains and without added sugar.  Vegetables  All fresh vegetables, especially fresh red, dark green, or orange vegetables. Peas and beans. Low-sodium frozen or canned vegetables prepared without added salt. Low-sodium vegetable juices.  Fruits  All fresh, frozen, and dried fruits. Canned fruit packed in water or fruit juice without added sugar. Fruit juices without added sugar.  Meats and Other Protein Sources  Boiled, baked, or grilled lean meat trimmed of fat. Skinless poultry. Fresh seafood and shellfish. Canned  seafood packed in water. Unsalted nuts and unsalted nut butters. Tofu. Dried beans and pea. Eggs.  Dairy  Low-fat or fat-free milk, yogurt, and cheeses.  Sweets and Desserts  Frozen desserts made from low-fat milk.  Fats and Oils  Olive, peanut, and canola oils and margarine. Salad dressing and mayonnaise made from these oils.  Other  Soups and casseroles made from allowed ingredients and without added fat or salt.  The items listed above may not be a complete list of recommended foods or beverages. Contact your dietitian for more options.  What foods are not recommended?  Grains  Sweetened, low-fiber cereals. Packaged baked goods. Snack crackers and chips. Cheese crackers, butter crackers, and biscuits. Frozen waffles, sweet breads, doughnuts, pastries, packaged baking mixes, pancakes, cakes, and cookies.  Vegetables  Regular canned or frozen vegetables or vegetables prepared with salt. Canned tomatoes. Canned tomato sauce. Fried vegetables. Vegetables in cream sauce or cheese sauce.  Fruits  Fruits packed in syrup or made with added sugar.  Meats and Other Protein Sources  Marbled or fatty meats such as ribs. Poultry with skin. Fried meats, poultry, eggs, or fish. Sausages, hot dogs, and deli meats such as pastrami, bologna, or salami.  Dairy  Whole milk, cream, cheeses made from whole milk, sour cream. Ice cream or yogurt made from whole milk or with added sugar.  Beverages  For adults, no more than one alcoholic drink per day. Regular soft drinks or other sugary beverages. Juice drinks.  Sweets and Desserts  Sugary or fatty desserts, candy, and other sweets.  Fats and Oils  Solid shortening or partially hydrogenated oils. Solid margarine. Margarine that contains trans fats. Butter.  The items listed above may not be a complete list of foods and beverages to avoid. Contact your dietitian for more information.  This information is not intended to replace advice given to you by your health care provider. Make sure  you discuss any questions you have with your health care provider.  Document Released: 01/06/2009 Document Revised: 05/25/2017 Document Reviewed: 11/26/2014  [employer] Interactive Patient Education © 2018 [employer]         -  Documentation of education   The patient BMI is outside this range and we recommended/discussed today to utilize a diet/exercise program to get back into the appropriate range.  Federal guidelines recommend that people under the age of 65 should have a BMI of 18.5-24.9  Food diary:  Bring to next visit  tial step is to document everything that is consumed. Pt's that have a food diary  Lose 2x the weight  by keeping track of foods.   Choose one bad food weekly and eliminate it from your diet.  Replace with one healthy food  Exercise diary: Goal over next 2-4 weeks is walk 30 minutes per day 5 days per week at pace difficult to hold conversation.   Drink more water, less soda.   Cut back on portion sizes.   Today we encouraged roughly a 1 lb per week weight loss with initial goal of 5% weight loss.  Avoid fast foods, eat more salads  If eating out for a meal, consider cutting food in half and placing into a to go container.  Individually portion any foods coming into the home   Use smaller plates  Drink 12 ozof water 30 minutes before meal as way to suppress appetite.  Discussed Contrave, metformin, topamax, Belviq and the cost of medications  Encouraged internet programs or self help books  Set  Goals that are realistic   Educated on ways to measure food  Baseball: 1 cup good for green salad, frozen yogurt, medium piece of fruit  Handful:  ½ cup good cut fruit, cooked vegetables, pasta, rice  Egg:  ¼ cup good for dried fruit  Deck of cards: 3 ounces good for meat and poultry  Check book:  3 ounces grilled fish  Plate Method:  reduce plate size to 9 inch dinner plate.  Half of plate should be filled with non-starchy vegetables (broccoli, lettuce, cauliflower, tomatoes), ¼ plate with lean  source of protein (lean chicken, turkey, fish), remaining ½ with whole grains (brown rice, potato, whole grain breads  Avoid liquid calories (regular soda, juice, coffee with cream)  Focus  on water, seltzer water and other non-calorie drinks  Replace regular sugar with non-caloric sweeteners  Avoid skipping meals: plan small regular meals throughout the day in order to keep your hunger controlled  Consider using meal replacements if unable to plan a healthy meal (protein shake, high protein bar)  Replace all white bread with whole wheat/whole grain alternative  Swap regular salad dressings (mayonnaise, butter, or low fat or fat free alternative)  Avoid high fat, high calorie, high carbohydrate snakes (cookies, pastries, cakes)  Snack on fruits, low fat dairy (yogurt, cottage cheese)

## 2018-07-02 ENCOUNTER — HOSPITAL ENCOUNTER (OUTPATIENT)
Dept: CARDIOLOGY | Facility: HOSPITAL | Age: 48
Discharge: HOME | End: 2018-07-02
Admitting: NURSE PRACTITIONER

## 2018-07-02 VITALS — HEART RATE: 82 BPM | SYSTOLIC BLOOD PRESSURE: 133 MMHG | DIASTOLIC BLOOD PRESSURE: 90 MMHG

## 2018-07-02 DIAGNOSIS — R94.31 ABNORMAL EKG: ICD-10-CM

## 2018-07-02 LAB
BH CV STRESS BP STAGE 1: NORMAL
BH CV STRESS BP STAGE 2: NORMAL
BH CV STRESS DURATION MIN STAGE 1: 3
BH CV STRESS DURATION MIN STAGE 2: 3
BH CV STRESS DURATION SEC STAGE 1: 0
BH CV STRESS DURATION SEC STAGE 2: 0
BH CV STRESS GRADE STAGE 1: 10
BH CV STRESS GRADE STAGE 2: 12
BH CV STRESS HR STAGE 1: 127
BH CV STRESS HR STAGE 2: 151
BH CV STRESS METS STAGE 1: 5
BH CV STRESS METS STAGE 2: 7.5
BH CV STRESS PROTOCOL 1: NORMAL
BH CV STRESS RECOVERY BP: NORMAL MMHG
BH CV STRESS RECOVERY HR: 100 BPM
BH CV STRESS SPEED STAGE 1: 1.7
BH CV STRESS SPEED STAGE 2: 2.5
BH CV STRESS STAGE 1: 1
BH CV STRESS STAGE 2: 2
MAXIMAL PREDICTED HEART RATE: 173 BPM
PERCENT MAX PREDICTED HR: 87.28 %
STRESS BASELINE BP: NORMAL MMHG
STRESS BASELINE HR: 87 BPM
STRESS PERCENT HR: 103 %
STRESS POST ESTIMATED WORKLOAD: 7.5 METS
STRESS POST EXERCISE DUR MIN: 6 MIN
STRESS POST EXERCISE DUR SEC: 0 SEC
STRESS POST PEAK BP: NORMAL MMHG
STRESS POST PEAK HR: 151 BPM
STRESS TARGET HR: 147 BPM

## 2018-07-02 PROCEDURE — 93017 CV STRESS TEST TRACING ONLY: CPT

## 2018-07-02 PROCEDURE — 93018 CV STRESS TEST I&R ONLY: CPT | Performed by: INTERNAL MEDICINE

## 2018-07-02 NOTE — PROGRESS NOTES
I am covering for this patients primary care provider while she is out of the office.  This reports has been reviewed but not released.    Bandar Perez, APRN 7/2/2018 4:44 PM.

## 2018-11-27 DIAGNOSIS — E66.01 MORBID OBESITY WITH BMI OF 40.0-44.9, ADULT (HCC): ICD-10-CM

## 2018-11-27 RX ORDER — NALTREXONE HYDROCHLORIDE AND BUPROPION HYDROCHLORIDE 8; 90 MG/1; MG/1
TABLET, EXTENDED RELEASE ORAL
Qty: 360 TABLET | Refills: 1 | OUTPATIENT
Start: 2018-11-27

## 2018-12-12 ENCOUNTER — OFFICE VISIT (OUTPATIENT)
Dept: FAMILY MEDICINE CLINIC | Facility: CLINIC | Age: 48
End: 2018-12-12

## 2018-12-12 VITALS
TEMPERATURE: 99 F | OXYGEN SATURATION: 98 % | RESPIRATION RATE: 18 BRPM | SYSTOLIC BLOOD PRESSURE: 138 MMHG | DIASTOLIC BLOOD PRESSURE: 86 MMHG | BODY MASS INDEX: 42.66 KG/M2 | HEIGHT: 72 IN | HEART RATE: 69 BPM | WEIGHT: 315 LBS

## 2018-12-12 DIAGNOSIS — K21.9 GASTROESOPHAGEAL REFLUX DISEASE WITHOUT ESOPHAGITIS: ICD-10-CM

## 2018-12-12 DIAGNOSIS — I10 ESSENTIAL HYPERTENSION: ICD-10-CM

## 2018-12-12 PROCEDURE — 99214 OFFICE O/P EST MOD 30 MIN: CPT | Performed by: NURSE PRACTITIONER

## 2018-12-12 RX ORDER — RABEPRAZOLE SODIUM 20 MG/1
20 TABLET, DELAYED RELEASE ORAL DAILY
Qty: 90 TABLET | Refills: 1 | Status: SHIPPED | OUTPATIENT
Start: 2018-12-12 | End: 2019-07-08 | Stop reason: SDUPTHER

## 2018-12-12 RX ORDER — LISINOPRIL 10 MG/1
10 TABLET ORAL DAILY
Qty: 90 TABLET | Refills: 1 | Status: SHIPPED | OUTPATIENT
Start: 2018-12-12 | End: 2019-06-09 | Stop reason: SDUPTHER

## 2018-12-12 NOTE — PROGRESS NOTES
CC:  HTN    Danny Vega is a 48 yr old male here for follow up HTN, barretts esophagitis and he sees Dr. Hearn at [facility].  He sent him to [place] to see if he was a candidate for the TIP, however .  He is compliant with appts, taking medication and getting follow up.       Hypertension   This is a chronic problem. The current episode started more than 1 year ago. The problem is unchanged. The problem is controlled. Pertinent negatives include no anxiety, blurred vision, chest pain, headaches, palpitations or shortness of breath. There are no associated agents to hypertension. Risk factors for coronary artery disease include male gender, obesity, sedentary lifestyle and family history. Past treatments include ACE inhibitors. Current antihypertension treatment includes ACE inhibitors. There are no compliance problems.  There is no history of angina, kidney disease, CAD/MI, CVA, heart failure, left ventricular hypertrophy, PVD or retinopathy. There is no history of coarctation of the aorta, hyperaldosteronism, hypercortisolism, pheochromocytoma, renovascular disease or sleep apnea.   Heartburn   He complains of heartburn. He reports no abdominal pain, no chest pain, no coughing, no hoarse voice, no nausea, no stridor or no tooth decay. This is a chronic problem. The current episode started more than 1 year ago. The problem occurs occasionally. The problem has been unchanged. The heartburn is located in the abdomen. The heartburn does not wake him from sleep. The heartburn does not limit his activity. The heartburn doesn't change with position. The symptoms are aggravated by lying down and caffeine. Pertinent negatives include no fatigue. Risk factors include Somers's esophagus. He has tried head elevation, a diet change, a PPI and an antacid for the symptoms. The treatment provided moderate relief. Past procedures do not include an abdominal ultrasound, an EGD, esophageal manometry, esophageal pH monitoring, H.  pylori antibody titer or a UGI.        The following portions of the patient's history were reviewed and updated as appropriate: allergies, current medications, past family history, past medical history, past social history, past surgical history and problem list.    Review of Systems   Constitutional: Negative for activity change, appetite change, fatigue and fever.   HENT: Negative.  Negative for hoarse voice.    Eyes: Negative for blurred vision.   Respiratory: Negative for cough, chest tightness and shortness of breath.    Cardiovascular: Negative for chest pain, palpitations and leg swelling.   Gastrointestinal: Negative for abdominal pain and nausea.   Musculoskeletal: Negative for arthralgias, back pain and bursitis.   Neurological: Negative for light-headedness, numbness and memory problem.   Psychiatric/Behavioral: Negative for behavioral problems, decreased concentration and depressed mood.   All other systems reviewed and are negative.      Objective   Physical Exam   Constitutional: He is oriented to person, place, and time. Vital signs are normal. He appears well-developed and well-nourished. He is cooperative.   HENT:   Head: Normocephalic and atraumatic.   Right Ear: Hearing, tympanic membrane, external ear and ear canal normal.   Left Ear: Hearing, tympanic membrane, external ear and ear canal normal.   Nose: Nose normal.   Mouth/Throat: Uvula is midline, oropharynx is clear and moist and mucous membranes are normal.   Eyes: Conjunctivae, EOM and lids are normal. Pupils are equal, round, and reactive to light.   Neck: Normal range of motion. Neck supple.   Cardiovascular: Normal rate, regular rhythm and normal heart sounds.   Pulmonary/Chest: Effort normal and breath sounds normal.   Abdominal: Soft. Normal appearance and bowel sounds are normal.   Lymphadenopathy:        Head (right side): No submental, no submandibular and no tonsillar adenopathy present.        Head (left side): No submental, no  submandibular and no tonsillar adenopathy present.   Neurological: He is alert and oriented to person, place, and time. He has normal strength. No cranial nerve deficit or sensory deficit. He displays a negative Romberg sign.   Skin: Skin is warm, dry and intact.   Psychiatric: He has a normal mood and affect. His speech is normal and behavior is normal. Judgment and thought content normal. Cognition and memory are normal.   Nursing note and vitals reviewed.        Assessment/Plan   Danny was seen today for hypertension.    Diagnoses and all orders for this visit:    Essential hypertension  -     lisinopril (PRINIVIL,ZESTRIL) 10 MG tablet; Take 1 tablet by mouth Daily.  -     CBC & Differential  -     Comprehensive metabolic panel  -     Lipid panel    Gastroesophageal reflux disease without esophagitis  -     RABEprazole (ACIPHEX) 20 MG EC tablet; Take 1 tablet by mouth Daily.      Return in about 6 months (around 6/12/2019).  Ronit Mccabe, DNP, APRN-BC  12/12/2018

## 2018-12-13 LAB
ALBUMIN SERPL-MCNC: 4.5 G/DL (ref 3.5–5)
ALBUMIN/GLOB SERPL: 1.4 G/DL (ref 1.1–2.5)
ALP SERPL-CCNC: 62 U/L (ref 24–120)
ALT SERPL-CCNC: 40 U/L (ref 0–54)
AST SERPL-CCNC: 30 U/L (ref 7–45)
BASOPHILS # BLD AUTO: 0.07 10*3/MM3 (ref 0–0.2)
BASOPHILS NFR BLD AUTO: 0.8 % (ref 0–2)
BILIRUB SERPL-MCNC: 0.4 MG/DL (ref 0.1–1)
BUN SERPL-MCNC: 15 MG/DL (ref 5–21)
BUN/CREAT SERPL: 18.5 (ref 7–25)
CALCIUM SERPL-MCNC: 9.6 MG/DL (ref 8.4–10.4)
CHLORIDE SERPL-SCNC: 99 MMOL/L (ref 98–110)
CHOLEST SERPL-MCNC: 193 MG/DL (ref 130–200)
CO2 SERPL-SCNC: 27 MMOL/L (ref 24–31)
CREAT SERPL-MCNC: 0.81 MG/DL (ref 0.5–1.4)
EOSINOPHIL # BLD AUTO: 0.31 10*3/MM3 (ref 0–0.7)
EOSINOPHIL NFR BLD AUTO: 3.6 % (ref 0–4)
ERYTHROCYTE [DISTWIDTH] IN BLOOD BY AUTOMATED COUNT: 13.8 % (ref 12–15)
GLOBULIN SER CALC-MCNC: 3.2 GM/DL
GLUCOSE SERPL-MCNC: 111 MG/DL (ref 70–100)
HCT VFR BLD AUTO: 47 % (ref 40–52)
HDLC SERPL-MCNC: 35 MG/DL
HGB BLD-MCNC: 14.9 G/DL (ref 14–18)
IMM GRANULOCYTES # BLD: 0.03 10*3/MM3 (ref 0–0.03)
IMM GRANULOCYTES NFR BLD: 0.4 % (ref 0–5)
LDLC SERPL CALC-MCNC: 130 MG/DL (ref 0–99)
LYMPHOCYTES # BLD AUTO: 2.76 10*3/MM3 (ref 0.72–4.86)
LYMPHOCYTES NFR BLD AUTO: 32.3 % (ref 15–45)
MCH RBC QN AUTO: 29.6 PG (ref 28–32)
MCHC RBC AUTO-ENTMCNC: 31.7 G/DL (ref 33–36)
MCV RBC AUTO: 93.3 FL (ref 82–95)
MONOCYTES # BLD AUTO: 0.65 10*3/MM3 (ref 0.19–1.3)
MONOCYTES NFR BLD AUTO: 7.6 % (ref 4–12)
NEUTROPHILS # BLD AUTO: 4.73 10*3/MM3 (ref 1.87–8.4)
NEUTROPHILS NFR BLD AUTO: 55.3 % (ref 39–78)
NRBC BLD AUTO-RTO: 0 /100 WBC (ref 0–0)
PLATELET # BLD AUTO: 274 10*3/MM3 (ref 130–400)
POTASSIUM SERPL-SCNC: 4.7 MMOL/L (ref 3.5–5.3)
PROT SERPL-MCNC: 7.7 G/DL (ref 6.3–8.7)
RBC # BLD AUTO: 5.04 10*6/MM3 (ref 4.8–5.9)
SODIUM SERPL-SCNC: 141 MMOL/L (ref 135–145)
TRIGL SERPL-MCNC: 138 MG/DL (ref 0–149)
VLDLC SERPL CALC-MCNC: 27.6 MG/DL
WBC # BLD AUTO: 8.55 10*3/MM3 (ref 4.8–10.8)

## 2018-12-14 DIAGNOSIS — R73.01 IMPAIRED FASTING GLUCOSE: Primary | ICD-10-CM

## 2018-12-15 LAB — HBA1C MFR BLD: 5.7 % (ref 4.8–5.6)

## 2018-12-17 ENCOUNTER — OFFICE VISIT (OUTPATIENT)
Dept: FAMILY MEDICINE CLINIC | Facility: CLINIC | Age: 48
End: 2018-12-17

## 2018-12-17 VITALS
SYSTOLIC BLOOD PRESSURE: 136 MMHG | HEIGHT: 72 IN | RESPIRATION RATE: 18 BRPM | WEIGHT: 315 LBS | BODY MASS INDEX: 42.66 KG/M2 | TEMPERATURE: 99.2 F | OXYGEN SATURATION: 98 % | HEART RATE: 96 BPM | DIASTOLIC BLOOD PRESSURE: 82 MMHG

## 2018-12-17 DIAGNOSIS — J06.9 UPPER RESPIRATORY TRACT INFECTION, UNSPECIFIED TYPE: Primary | ICD-10-CM

## 2018-12-17 LAB
HBA1C MFR BLD: 5.7 % (ref 4.8–5.6)
Lab: NORMAL
WRITTEN AUTHORIZATION: NORMAL

## 2018-12-17 PROCEDURE — 99213 OFFICE O/P EST LOW 20 MIN: CPT | Performed by: NURSE PRACTITIONER

## 2018-12-17 RX ORDER — PREDNISONE 20 MG/1
20 TABLET ORAL DAILY
Qty: 5 TABLET | Refills: 0 | Status: SHIPPED | OUTPATIENT
Start: 2018-12-17 | End: 2019-01-23

## 2018-12-17 NOTE — PROGRESS NOTES
Diane Vega is a 48 y.o. male. Here today for complaints of congestion, cough, headache that started Saturday.      URI    This is a new problem. The current episode started in the past 7 days. The problem has been gradually worsening. The maximum temperature recorded prior to his arrival was 100.4 - 100.9 F. The fever has been present for less than 1 day. Associated symptoms include congestion, coughing, headaches, rhinorrhea, sinus pain and sneezing. Pertinent negatives include no abdominal pain, chest pain, diarrhea, ear pain, nausea, sore throat, vomiting or wheezing. He has tried sleep and increased fluids for the symptoms. The treatment provided mild relief.        The following portions of the patient's history were reviewed and updated as appropriate: allergies, current medications, past family history, past medical history, past social history, past surgical history and problem list.    Review of Systems   Constitutional: Positive for fatigue. Negative for chills and fever.   HENT: Positive for congestion, postnasal drip, rhinorrhea, sinus pressure and sneezing. Negative for ear pain and sore throat.    Eyes: Negative for blurred vision, pain, redness and itching.   Respiratory: Positive for cough. Negative for chest tightness, shortness of breath and wheezing.    Cardiovascular: Negative for chest pain, palpitations and leg swelling.   Gastrointestinal: Negative for abdominal pain, constipation, diarrhea, nausea and vomiting.   Endocrine: Negative for cold intolerance, heat intolerance, polydipsia and polyphagia.   Musculoskeletal: Negative for arthralgias, back pain and myalgias.   Skin: Negative.    Neurological: Positive for headache. Negative for dizziness, weakness, light-headedness and numbness.   Psychiatric/Behavioral: Negative for behavioral problems, decreased concentration and depressed mood. The patient is not nervous/anxious.        Objective   Physical Exam   Constitutional:  He is oriented to person, place, and time. He appears well-developed and well-nourished.   HENT:   Head: Normocephalic and atraumatic.   Right Ear: Hearing, tympanic membrane, external ear and ear canal normal.   Left Ear: Hearing, tympanic membrane, external ear and ear canal normal.   Nose: Rhinorrhea and congestion present. Right sinus exhibits maxillary sinus tenderness. Left sinus exhibits maxillary sinus tenderness.   Mouth/Throat: Uvula is midline. Posterior oropharyngeal erythema present.   Neck: Normal range of motion. Neck supple.   Cardiovascular: Normal rate, regular rhythm and normal heart sounds.   Pulmonary/Chest: Effort normal and breath sounds normal.   Abdominal: Soft. Bowel sounds are normal.   Musculoskeletal: Normal range of motion.   Neurological: He is alert and oriented to person, place, and time.   Skin: Skin is warm and dry. Capillary refill takes less than 2 seconds.   Psychiatric: He has a normal mood and affect. His speech is normal and behavior is normal. Judgment and thought content normal. Cognition and memory are normal.   Nursing note and vitals reviewed.        Assessment/Plan   Danny was seen today for uri.    Diagnoses and all orders for this visit:    Upper respiratory tract infection, unspecified type  -     predniSONE (DELTASONE) 20 MG tablet; Take 1 tablet by mouth Daily.  Use good handwashing techniques  May call if worsens and we will send in a zpack    Return in about 1 week (around 12/24/2018), or if symptoms worsen or fail to improve.    Ronit Mccabe, YURIY, APRN-BC    12/17/2018

## 2018-12-17 NOTE — PATIENT INSTRUCTIONS
"Upper Respiratory Infection, Adult  Most upper respiratory infections (URIs) are a viral infection of the air passages leading to the lungs. A URI affects the nose, throat, and upper air passages. The most common type of URI is nasopharyngitis and is typically referred to as \"the common cold.\"  URIs run their course and usually go away on their own. Most of the time, a URI does not require medical attention, but sometimes a bacterial infection in the upper airways can follow a viral infection. This is called a secondary infection. Sinus and middle ear infections are common types of secondary upper respiratory infections.  Bacterial pneumonia can also complicate a URI. A URI can worsen asthma and chronic obstructive pulmonary disease (COPD). Sometimes, these complications can require emergency medical care and may be life threatening.  What are the causes?  Almost all URIs are caused by viruses. A virus is a type of germ and can spread from one person to another.  What increases the risk?  You may be at risk for a URI if:  · You smoke.  · You have chronic heart or lung disease.  · You have a weakened defense (immune) system.  · You are very young or very old.  · You have nasal allergies or asthma.  · You work in crowded or poorly ventilated areas.  · You work in health care facilities or schools.    What are the signs or symptoms?  Symptoms typically develop 2-3 days after you come in contact with a cold virus. Most viral URIs last 7-10 days. However, viral URIs from the influenza virus (flu virus) can last 14-18 days and are typically more severe. Symptoms may include:  · Runny or stuffy (congested) nose.  · Sneezing.  · Cough.  · Sore throat.  · Headache.  · Fatigue.  · Fever.  · Loss of appetite.  · Pain in your forehead, behind your eyes, and over your cheekbones (sinus pain).  · Muscle aches.    How is this diagnosed?  Your health care provider may diagnose a URI by:  · Physical exam.  · Tests to check that your " symptoms are not due to another condition such as:  ? Strep throat.  ? Sinusitis.  ? Pneumonia.  ? Asthma.    How is this treated?  A URI goes away on its own with time. It cannot be cured with medicines, but medicines may be prescribed or recommended to relieve symptoms. Medicines may help:  · Reduce your fever.  · Reduce your cough.  · Relieve nasal congestion.    Follow these instructions at home:  · Take medicines only as directed by your health care provider.  · Gargle warm saltwater or take cough drops to comfort your throat as directed by your health care provider.  · Use a warm mist humidifier or inhale steam from a shower to increase air moisture. This may make it easier to breathe.  · Drink enough fluid to keep your urine clear or pale yellow.  · Eat soups and other clear broths and maintain good nutrition.  · Rest as needed.  · Return to work when your temperature has returned to normal or as your health care provider advises. You may need to stay home longer to avoid infecting others. You can also use a face mask and careful hand washing to prevent spread of the virus.  · Increase the usage of your inhaler if you have asthma.  · Do not use any tobacco products, including cigarettes, chewing tobacco, or electronic cigarettes. If you need help quitting, ask your health care provider.  How is this prevented?  The best way to protect yourself from getting a cold is to practice good hygiene.  · Avoid oral or hand contact with people with cold symptoms.  · Wash your hands often if contact occurs.    There is no clear evidence that vitamin C, vitamin E, echinacea, or exercise reduces the chance of developing a cold. However, it is always recommended to get plenty of rest, exercise, and practice good nutrition.  Contact a health care provider if:  · You are getting worse rather than better.  · Your symptoms are not controlled by medicine.  · You have chills.  · You have worsening shortness of breath.  · You have  brown or red mucus.  · You have yellow or brown nasal discharge.  · You have pain in your face, especially when you bend forward.  · You have a fever.  · You have swollen neck glands.  · You have pain while swallowing.  · You have white areas in the back of your throat.  Get help right away if:  · You have severe or persistent:  ? Headache.  ? Ear pain.  ? Sinus pain.  ? Chest pain.  · You have chronic lung disease and any of the following:  ? Wheezing.  ? Prolonged cough.  ? Coughing up blood.  ? A change in your usual mucus.  · You have a stiff neck.  · You have changes in your:  ? Vision.  ? Hearing.  ? Thinking.  ? Mood.  This information is not intended to replace advice given to you by your health care provider. Make sure you discuss any questions you have with your health care provider.  Document Released: 06/13/2002 Document Revised: 08/20/2017 Document Reviewed: 03/25/2015  [employer] Interactive Patient Education © 2018 [employer]

## 2019-01-15 ENCOUNTER — OFFICE VISIT (OUTPATIENT)
Dept: GASTROENTEROLOGY | Age: 49
End: 2019-01-15
Payer: COMMERCIAL

## 2019-01-15 VITALS
OXYGEN SATURATION: 98 % | BODY MASS INDEX: 42.53 KG/M2 | WEIGHT: 314 LBS | DIASTOLIC BLOOD PRESSURE: 84 MMHG | SYSTOLIC BLOOD PRESSURE: 140 MMHG | HEART RATE: 74 BPM | HEIGHT: 72 IN

## 2019-01-15 DIAGNOSIS — K22.70 BARRETT'S ESOPHAGUS DETERMINED BY BIOPSY: ICD-10-CM

## 2019-01-15 DIAGNOSIS — K21.9 CHRONIC GERD: Primary | ICD-10-CM

## 2019-01-15 PROCEDURE — 99214 OFFICE O/P EST MOD 30 MIN: CPT | Performed by: NURSE PRACTITIONER

## 2019-01-15 ASSESSMENT — ENCOUNTER SYMPTOMS
COUGH: 0
RECTAL PAIN: 0
BACK PAIN: 0
DIARRHEA: 0
BLOOD IN STOOL: 0
VOMITING: 0
VOICE CHANGE: 0
ABDOMINAL DISTENTION: 0
SORE THROAT: 0
NAUSEA: 0
SHORTNESS OF BREATH: 0
CHEST TIGHTNESS: 0
ABDOMINAL PAIN: 0
CONSTIPATION: 0

## 2019-01-23 ENCOUNTER — OFFICE VISIT (OUTPATIENT)
Dept: FAMILY MEDICINE CLINIC | Facility: CLINIC | Age: 49
End: 2019-01-23

## 2019-01-23 VITALS
WEIGHT: 315 LBS | HEART RATE: 88 BPM | BODY MASS INDEX: 42.66 KG/M2 | HEIGHT: 72 IN | SYSTOLIC BLOOD PRESSURE: 126 MMHG | DIASTOLIC BLOOD PRESSURE: 84 MMHG | TEMPERATURE: 98 F | OXYGEN SATURATION: 99 % | RESPIRATION RATE: 18 BRPM

## 2019-01-23 DIAGNOSIS — J20.9 ACUTE BRONCHITIS, UNSPECIFIED ORGANISM: Primary | ICD-10-CM

## 2019-01-23 DIAGNOSIS — J20.8 ACUTE BRONCHITIS DUE TO OTHER SPECIFIED ORGANISMS: ICD-10-CM

## 2019-01-23 DIAGNOSIS — J01.00 ACUTE NON-RECURRENT MAXILLARY SINUSITIS: ICD-10-CM

## 2019-01-23 DIAGNOSIS — J01.00 ACUTE NON-RECURRENT MAXILLARY SINUSITIS: Primary | ICD-10-CM

## 2019-01-23 PROCEDURE — 99214 OFFICE O/P EST MOD 30 MIN: CPT | Performed by: NURSE PRACTITIONER

## 2019-01-23 RX ORDER — DEXTROMETHORPHAN HYDROBROMIDE AND PROMETHAZINE HYDROCHLORIDE 15; 6.25 MG/5ML; MG/5ML
5 SYRUP ORAL 4 TIMES DAILY PRN
Qty: 120 ML | Refills: 0 | Status: SHIPPED | OUTPATIENT
Start: 2019-01-23 | End: 2019-03-19

## 2019-01-23 RX ORDER — AZITHROMYCIN 250 MG/1
TABLET, FILM COATED ORAL
Qty: 6 TABLET | Refills: 0 | Status: SHIPPED | OUTPATIENT
Start: 2019-01-23 | End: 2019-03-19

## 2019-01-23 RX ORDER — PREDNISONE 20 MG/1
20 TABLET ORAL DAILY
Qty: 7 TABLET | Refills: 0 | Status: CANCELLED | OUTPATIENT
Start: 2019-01-23 | End: 2019-01-30

## 2019-01-23 RX ORDER — PREDNISONE 20 MG/1
20 TABLET ORAL DAILY
Qty: 7 TABLET | Refills: 0 | Status: SHIPPED | OUTPATIENT
Start: 2019-01-23 | End: 2019-01-30

## 2019-01-23 RX ORDER — AZITHROMYCIN 250 MG/1
TABLET, FILM COATED ORAL
Qty: 6 TABLET | Refills: 0 | Status: CANCELLED | OUTPATIENT
Start: 2019-01-23

## 2019-01-23 NOTE — PROGRESS NOTES
Chief Complaint   Patient presents with   • URI     Sinus drainage, cough, congestion.   Symptoms started approximately 1 week ago.          No Known Allergies      HPI:  Danny Vega is a 48 y.o. male presents today with complaints of continued sinus and bronchitis.  He was seen in December for the respiratory infection, says that he got some better but then the first of January he got worse and it has continued to worsen since.  Denies fever and chills, however, got in a coughing spell and it caused his reflux to get flared up.  He says he is blowing out green and coughing a lot.     Chronic problems:  Reflux stable with rabeprazole, HTN stable with lisinopril and aspirin.      Past Medical History:   Diagnosis Date   • GERD (gastroesophageal reflux disease)    • Hyperlipidemia    • Hypertension      Past Surgical History:   Procedure Laterality Date   • APPENDECTOMY       Social History     Socioeconomic History   • Marital status:      Spouse name: Not on file   • Number of children: Not on file   • Years of education: Not on file   • Highest education level: Not on file   Tobacco Use   • Smoking status: Never Smoker   • Smokeless tobacco: Never Used   Substance and Sexual Activity   • Alcohol use: No   • Drug use: No   • Sexual activity: Defer     Family History   Problem Relation Age of Onset   • Hypertension Mother    • Hypertension Father    • No Known Problems Sister    • No Known Problems Daughter    • Diabetes Maternal Grandmother    • Hypertension Maternal Grandmother    • Dementia Maternal Grandmother    • Cancer Maternal Grandfather    • Diabetes Paternal Grandmother    • Cancer Paternal Grandfather        Current Outpatient Medications on File Prior to Visit   Medication Sig Dispense Refill   • aspirin 81 MG EC tablet Take 81 mg by mouth Daily.     • glucosamine sulfate 500 MG capsule capsule Take  by mouth Daily.     • lisinopril (PRINIVIL,ZESTRIL) 10 MG tablet Take 1 tablet by mouth  "Daily. 90 tablet 1   • Multiple Vitamins-Minerals (MULTIVITAMIN PO) Take  by mouth Daily.     • RABEprazole (ACIPHEX) 20 MG EC tablet Take 1 tablet by mouth Daily. 90 tablet 1   • [DISCONTINUED] predniSONE (DELTASONE) 20 MG tablet Take 1 tablet by mouth Daily. 5 tablet 0     No current facility-administered medications on file prior to visit.         ROS:  REVIEW OF SYMPTOMS: (Positives bolded)  General:  weight loss, fever, chills, night sweats, fatigue, appetite loss  HEENT:  sore throat tinnitus, bloody nose, hearing loss, sinus pain/pressure, ear pain/pressure.   Respiratory: shortness of breath, cough, hemoptysis, wheezing, pleurisy,   Cardiovascular:  chest pain, PND, palpitation, edema, orthopnea, syncope, swelling of extremities  Gastro: Nausea, vomiting, diarrhea, hematemesis, abdominal pain, constipation  Neuro:  Migraine, numbness, ataxia, tremor, vertigo, weakness, memory loss, Irritability, dizziness  Allergic/immune: allergic rhinitis, hay fever, asthma, hives  \"All other systems reviewed and negative, except as listed above.”      OBJECTIVE:  Constitutional:  Alert, oriented x 3, well developed, well nourished. Consistent with stated age. Not in acute distress.  Has normal posture. Gait and station normal. .  Behavior appropriate. Patient is pleasant and cooperative with the interview and exam.    Skin: No rashes, no visible scars or suspicious moles noted.  Skin is warm to touch. Normal appropriate skin turgor.  Capillary refill is normal bilateral Upper and lower extremity.     Head/Neck: Head is normocephalic and atraumatic.  Neck without visible/palpable lumps.  No thyromegaly.    Eye: Bilaterally EOMI.  PERRLA.  Sclera/conjunctiva is normal, no discharge. Cornea is normal and clear. Lens is normal.  Eyeball normal. Upper eyelid normal.  Lower eyelid normal.     OROPHARYNX: Mucosa pink and moist, posterior pharynx erythematous. Dentition average for age. No obvious dental carries. No lesions. " Tongue normal.    EARS: Bilateral auditory canal normal, without redness or cerumen impaction. Bilateral Tympanic membrane Normal, pearly gray with good cone of light and landmarks.  Hearing grossly intact to normal conversation.     NOSE: Purulent drainage, mucosa is erythematous, edematous and congested, nares patent    SINUS:  Frontal and maxillary sinus tenderness on palpation.     CHEST/LUNG: No use of accessory muscles, chest non-tender on palpation.  Breath sounds normal throughout all lung fields.  No wheezes, rales, rhonchi.    CARDIOVASCULAR:  Inspection: Carotid artery bilateral normal, no bruits, pulse regular.  Palpation/Percussion Bilateral normal pulsations.  Auscultation: Regular rate and rhythm. No murmur noted in sitting, supine, standing or squatting positions.    LYMPH: Cervical Nodes-normal, size; non-tender to palpation. Axillary Nodes- normal size; non-tender to palpation.     Assessment:  Danny was seen today for uri.    Diagnoses and all orders for this visit:    Acute non-recurrent maxillary sinusitis  -     azithromycin (ZITHROMAX) 250 MG tablet; Take 2 tablets the first day, then 1 tablet daily for 4 days.    Acute bronchitis due to other specified organisms  -     predniSONE (DELTASONE) 20 MG tablet; Take 1 tablet by mouth Daily for 7 days.    Other orders  -     promethazine-dextromethorphan (PROMETHAZINE-DM) 6.25-15 MG/5ML syrup; Take 5 mL by mouth 4 (Four) Times a Day As Needed for Cough.      I spoke with the patient about the benefits and risks associated with antibiotic therapy; the benefits include treating a bacterial infection, preventing the spread of disease, and minimizing serious complications associated with bacterial diseases; the risks include antibiotic resistance, allergic reactions, oral and/ or vaginal candidia, and GI related side effects such as an upset stomach and diarrhea, as well as placing the patient at an increase risk for C-diff; verbal acknowledgement of  these risk were obtained; based on the patients complaint and clinical finding, no antibiotics are required at this time.             Return in about 1 week (around 1/30/2019), or if symptoms worsen or fail to improve.    Ronit Mccabe, YURIY, APRN-BC  01/23/2019

## 2019-01-23 NOTE — PATIENT INSTRUCTIONS

## 2019-02-21 ENCOUNTER — TELEPHONE (OUTPATIENT)
Dept: FAMILY MEDICINE CLINIC | Facility: CLINIC | Age: 49
End: 2019-02-21

## 2019-02-21 NOTE — TELEPHONE ENCOUNTER
Patient called requesting information on being tested for the Genetic Gene for cancer he states that his father just passed away from cancer as well as his grandfather. He was asking for information about this I was unable to provide him with any information and told him I would reach out to the provider. Please review

## 2019-02-25 NOTE — TELEPHONE ENCOUNTER
Please call pt and find out what type of cancer that his father and grandfather passed from.  Please explain to him there is not a genetic gene cancer testing that we do, but we can do earlier screenings for colon cancer and prostate cancer. He needs to be prompt in coming in for problem visits and keeping regular appts.  He needs to report any problems: rectal bleeding, prostate problems etc.

## 2019-03-06 PROCEDURE — 88305 TISSUE EXAM BY PATHOLOGIST: CPT | Performed by: INTERNAL MEDICINE

## 2019-03-06 PROCEDURE — 88342 IMHCHEM/IMCYTCHM 1ST ANTB: CPT | Performed by: INTERNAL MEDICINE

## 2019-03-07 ENCOUNTER — LAB REQUISITION (OUTPATIENT)
Dept: LAB | Facility: HOSPITAL | Age: 49
End: 2019-03-07

## 2019-03-07 DIAGNOSIS — Z00.00 ROUTINE GENERAL MEDICAL EXAMINATION AT A HEALTH CARE FACILITY: ICD-10-CM

## 2019-03-08 LAB
CYTO UR: NORMAL
LAB AP CASE REPORT: NORMAL
LAB AP CLINICAL INFORMATION: NORMAL
PATH REPORT.FINAL DX SPEC: NORMAL
PATH REPORT.GROSS SPEC: NORMAL

## 2019-03-19 ENCOUNTER — OFFICE VISIT (OUTPATIENT)
Dept: FAMILY MEDICINE CLINIC | Facility: CLINIC | Age: 49
End: 2019-03-19

## 2019-03-19 VITALS
OXYGEN SATURATION: 99 % | BODY MASS INDEX: 42.66 KG/M2 | WEIGHT: 315 LBS | HEIGHT: 72 IN | DIASTOLIC BLOOD PRESSURE: 91 MMHG | HEART RATE: 73 BPM | SYSTOLIC BLOOD PRESSURE: 136 MMHG | RESPIRATION RATE: 18 BRPM | TEMPERATURE: 98.2 F

## 2019-03-19 DIAGNOSIS — F41.8 DEPRESSION WITH ANXIETY: Primary | ICD-10-CM

## 2019-03-19 PROCEDURE — 99213 OFFICE O/P EST LOW 20 MIN: CPT | Performed by: NURSE PRACTITIONER

## 2019-03-19 NOTE — PROGRESS NOTES
CC:  Anxiety and depression    Danny Vega is 48 yr old here with complaints of anxiety and depression.  His dad  1 month ago and left a mess and he is also taking care of a 93 yr old aunt and says she is crazy.  His stress level is really high and he is struggling, and to add on top of that he is on swing shift and not getting much sleep.  Denies suicidal or homicidal. He says he feels himself being irritated.       Depression   Visit Type: initial  Onset of symptoms: 1-4 weeks ago  Progression since onset: gradually worsening  Patient presents with the following symptoms: decreased concentration, depressed mood and irritability.  Patient is not experiencing: choking sensation, dizziness, dry mouth, excessive worry and palpitations.  Frequency of symptoms: most days   Severity: moderate   Aggravated by: caffeine and family issues  Sleep quality: good  Risk factors: major life event  Patient has a history of: anxiety/panic attacks  No history of: arrhythmia, asthma, chronic lung disease, mental illness and substance abuse  Treatment tried: lifestyle changes  Compliance with treatment: good           The following portions of the patient's history were reviewed and updated as appropriate: allergies, current medications, past family history, past medical history, past social history, past surgical history and problem list.    Review of Systems   Constitutional: Positive for irritability. Negative for activity change and appetite change.   Respiratory: Negative for cough, choking and chest tightness.    Cardiovascular: Negative for chest pain, palpitations and leg swelling.   Musculoskeletal: Negative.    Allergic/Immunologic: Negative for environmental allergies, food allergies and immunocompromised state.   Neurological: Negative for dizziness and facial asymmetry.   Psychiatric/Behavioral: Positive for decreased concentration and depressed mood. Negative for substance abuse.   All other systems reviewed and  are negative.      Objective   Physical Exam   Constitutional: He is oriented to person, place, and time. Vital signs are normal. He appears well-developed and well-nourished. He is cooperative.   HENT:   Head: Normocephalic and atraumatic.   Right Ear: Hearing and tympanic membrane normal.   Left Ear: Hearing and tympanic membrane normal.   Nose: Nose normal.   Mouth/Throat: Uvula is midline, oropharynx is clear and moist and mucous membranes are normal.   Eyes: EOM are normal. Pupils are equal, round, and reactive to light.   Cardiovascular: Normal rate, regular rhythm and normal heart sounds.   Pulmonary/Chest: Effort normal and breath sounds normal.   Lymphadenopathy:        Head (right side): No submental, no submandibular and no tonsillar adenopathy present.        Head (left side): No submental, no submandibular and no tonsillar adenopathy present.   Neurological: He is alert and oriented to person, place, and time. He has normal strength. No cranial nerve deficit or sensory deficit.   Skin: Skin is warm and dry.   Psychiatric: He has a normal mood and affect. His speech is normal and behavior is normal. Judgment and thought content normal. Cognition and memory are normal.         Assessment/Plan   Danny was seen today for anxiety.    Diagnoses and all orders for this visit:    Depression with anxiety  -     sertraline (ZOLOFT) 50 MG tablet; Take 1/2 tab (25 mg) nightly x 7 days then increase to 1 tab nightly.    PHQ-9 Depression Screening  Little interest or pleasure in doing things? 1   Feeling down, depressed, or hopeless? 1   Trouble falling or staying asleep, or sleeping too much? 0   Feeling tired or having little energy? 0   Poor appetite or overeating? 1   Feeling bad about yourself - or that you are a failure or have let yourself or your family down? 0   Trouble concentrating on things, such as reading the newspaper or watching television? 0   Moving or speaking so slowly that other people could  have noticed? Or the opposite - being so fidgety or restless that you have been moving around a lot more than usual? 1   Thoughts that you would be better off dead, or of hurting yourself in some way? 0   PHQ-9 Total Score 4   If you checked off any problems, how difficult have these problems made it for you to do your work, take care of things at home, or get along with other people? Not difficult at all       -  Return in about 1 month (around 4/19/2019) for  depression/anxiety.    Ronit Mccabe, DNP, APRN-BC  03/19/2019

## 2019-03-19 NOTE — PATIENT INSTRUCTIONS
Depression Screening  Depression screening is a tool that your health care provider can use to learn if you have symptoms of depression. Depression is a common condition with many symptoms that are also often found in other conditions. Depression is treatable, but it must first be diagnosed. You may not know that certain feelings, thoughts, and behaviors that you are having can be symptoms of depression. Taking a depression screening test can help you and your health care provider decide if you need more assessment, or if you should be referred to a mental health care provider.  What are the screening tests?  · You may have a physical exam to see if another condition is affecting your mental health. You may have a blood or urine sample taken during the physical exam.  · You may be interviewed using a screening tool that was developed from research, such as one of these:  ? Patient Health Questionnaire (PHQ). This is a set of either 2 or 9 questions. A health care provider who has been trained to score this screening test uses a guide to assess if your symptoms suggest that you may have depression.  ? Conley Depression Rating Scale (HAM-D). This is a set of either 17 or 24 questions. You may be asked to take it again during or after your treatment, to see if your depression has gotten better.  ? Suazo Depression Inventory (BDI). This is a set of 21 multiple choice questions. Your health care provider scores your answers to assess:  § Your level of depression, ranging from mild to severe.  § Your response to treatment.  · Your health care provider may talk with you about your daily activities, such as eating, sleeping, work, and recreation, and ask if you have had any changes in activity.  · Your health care provider may ask you to see a mental health specialist, such as a psychiatrist or psychologist, for more evaluation.  Who should be screened for depression?  · All adults, including adults with a family history  of a mental health disorder.  · Adolescents who are 12-18 years old.  · People who are recovering from a myocardial infarction (MI).  · Pregnant women, or women who have given birth.  · People who have a long-term (chronic) illness.  · Anyone who has been diagnosed with another type of a mental health disorder.  · Anyone who has symptoms that could show depression.  What do my results mean?  Your health care provider will review the results of your depression screening, physical exam, and lab tests. Positive screens suggest that you may have depression. Screening is the first step in getting the care that you may need. It is up to you to get your screening results. Ask your health care provider, or the department that is doing your screening tests, when your results will be ready. Talk with your health care provider about your results and diagnosis.  A diagnosis of depression is made using the Diagnostic and Statistical Manual of Mental Disorders (DSM-V). This is a book that lists the number and type of symptoms that must be present for a health care provider to give a specific diagnosis.   Your health care provider may work with you to treat your symptoms of depression, or your health care provider may help you find a mental health provider who can assess, diagnose, and treat your depression.  Get help right away if:  · You have thoughts about hurting yourself or others.  If you ever feel like you may hurt yourself or others, or have thoughts about taking your own life, get help right away. You can go to your nearest emergency department or call:  · Your local emergency services (911 in the [place]  · A suicide crisis helpline, such as the National Suicide Prevention Lifeline at 1-877.313.7364. This is open 24 hours a day.    Summary  · Depression screening is the first step in getting the help that you may need.  · If your screening test shows symptoms of depression (is positive), your health care provider may ask  you to see a mental health provider.  · Anyone who is age 12 or older should be screened for depression.  This information is not intended to replace advice given to you by your health care provider. Make sure you discuss any questions you have with your health care provider.  Document Released: 05/04/2018 Document Revised: 05/04/2018 Document Reviewed: 05/04/2018  [employer] Interactive Patient Education © 2019 [employer]

## 2019-04-11 DIAGNOSIS — F41.8 DEPRESSION WITH ANXIETY: ICD-10-CM

## 2019-04-11 NOTE — TELEPHONE ENCOUNTER
Patient called he is working overtime and is not able to get in for a few weeks he is scheduled to be seen he just cant for a few weeks. He reports that the zoloft id working well for him no side effects and is requesting a refill

## 2019-04-15 ENCOUNTER — OFFICE VISIT (OUTPATIENT)
Dept: FAMILY MEDICINE CLINIC | Facility: CLINIC | Age: 49
End: 2019-04-15

## 2019-04-15 VITALS
DIASTOLIC BLOOD PRESSURE: 83 MMHG | WEIGHT: 315 LBS | TEMPERATURE: 98.2 F | HEIGHT: 72 IN | RESPIRATION RATE: 18 BRPM | BODY MASS INDEX: 42.66 KG/M2 | OXYGEN SATURATION: 98 % | SYSTOLIC BLOOD PRESSURE: 123 MMHG | HEART RATE: 66 BPM

## 2019-04-15 DIAGNOSIS — F41.8 DEPRESSION WITH ANXIETY: Primary | ICD-10-CM

## 2019-04-15 DIAGNOSIS — R25.3 EYE MUSCLE TWITCHES: ICD-10-CM

## 2019-04-15 PROCEDURE — 99214 OFFICE O/P EST MOD 30 MIN: CPT | Performed by: NURSE PRACTITIONER

## 2019-04-15 RX ORDER — SERTRALINE HYDROCHLORIDE 100 MG/1
100 TABLET, FILM COATED ORAL NIGHTLY
Qty: 90 TABLET | Refills: 1 | Status: SHIPPED | OUTPATIENT
Start: 2019-04-15 | End: 2019-09-24 | Stop reason: SDUPTHER

## 2019-04-15 NOTE — PATIENT INSTRUCTIONS
Generalized Anxiety Disorder, Adult  Generalized anxiety disorder (COLIN) is a mental health disorder. People with this condition constantly worry about everyday events. Unlike normal anxiety, worry related to COLIN is not triggered by a specific event. These worries also do not fade or get better with time. COLIN interferes with life functions, including relationships, work, and school.  COLIN can vary from mild to severe. People with severe COLIN can have intense waves of anxiety with physical symptoms (panic attacks).  What are the causes?  The exact cause of COLIN is not known.  What increases the risk?  This condition is more likely to develop in:  · Women.  · People who have a family history of anxiety disorders.  · People who are very shy.  · People who experience very stressful life events, such as the death of a loved one.  · People who have a very stressful family environment.    What are the signs or symptoms?  People with COLIN often worry excessively about many things in their lives, such as their health and family. They may also be overly concerned about:  · Doing well at work.  · Being on time.  · Natural disasters.  · Friendships.    Physical symptoms of COLIN include:  · Fatigue.  · Muscle tension or having muscle twitches.  · Trembling or feeling shaky.  · Being easily startled.  · Feeling like your heart is pounding or racing.  · Feeling out of breath or like you cannot take a deep breath.  · Having trouble falling asleep or staying asleep.  · Sweating.  · Nausea, diarrhea, or irritable bowel syndrome (IBS).  · Headaches.  · Trouble concentrating or remembering facts.  · Restlessness.  · Irritability.    How is this diagnosed?  Your health care provider can diagnose COLIN based on your symptoms and medical history. You will also have a physical exam. The health care provider will ask specific questions about your symptoms, including how severe they are, when they started, and if they come and go. Your health care  provider may ask you about your use of alcohol or drugs, including prescription medicines. Your health care provider may refer you to a mental health specialist for further evaluation.  Your health care provider will do a thorough examination and may perform additional tests to rule out other possible causes of your symptoms.  To be diagnosed with COLIN, a person must have anxiety that:  · Is out of his or her control.  · Affects several different aspects of his or her life, such as work and relationships.  · Causes distress that makes him or her unable to take part in normal activities.  · Includes at least three physical symptoms of COLIN, such as restlessness, fatigue, trouble concentrating, irritability, muscle tension, or sleep problems.    Before your health care provider can confirm a diagnosis of COLIN, these symptoms must be present more days than they are not, and they must last for six months or longer.  How is this treated?  The following therapies are usually used to treat COLIN:  · Medicine. Antidepressant medicine is usually prescribed for long-term daily control. Antianxiety medicines may be added in severe cases, especially when panic attacks occur.  · Talk therapy (psychotherapy). Certain types of talk therapy can be helpful in treating COLIN by providing support, education, and guidance. Options include:  ? Cognitive behavioral therapy (CBT). People learn coping skills and techniques to ease their anxiety. They learn to identify unrealistic or negative thoughts and behaviors and to replace them with positive ones.  ? Acceptance and commitment therapy (ACT). This treatment teaches people how to be mindful as a way to cope with unwanted thoughts and feelings.  ? Biofeedback. This process trains you to manage your body's response (physiological response) through breathing techniques and relaxation methods. You will work with a therapist while machines are used to monitor your physical symptoms.  · Stress  management techniques. These include yoga, meditation, and exercise.    A mental health specialist can help determine which treatment is best for you. Some people see improvement with one type of therapy. However, other people require a combination of therapies.  Follow these instructions at home:  · Take over-the-counter and prescription medicines only as told by your health care provider.  · Try to maintain a normal routine.  · Try to anticipate stressful situations and allow extra time to manage them.  · Practice any stress management or self-calming techniques as taught by your health care provider.  · Do not punish yourself for setbacks or for not making progress.  · Try to recognize your accomplishments, even if they are small.  · Keep all follow-up visits as told by your health care provider. This is important.  Contact a health care provider if:  · Your symptoms do not get better.  · Your symptoms get worse.  · You have signs of depression, such as:  ? A persistently sad, cranky, or irritable mood.  ? Loss of enjoyment in activities that used to bring you hernando.  ? Change in weight or eating.  ? Changes in sleeping habits.  ? Avoiding friends or family members.  ? Loss of energy for normal tasks.  ? Feelings of guilt or worthlessness.  Get help right away if:  · You have serious thoughts about hurting yourself or others.  If you ever feel like you may hurt yourself or others, or have thoughts about taking your own life, get help right away. You can go to your nearest emergency department or call:  · Your local emergency services (911 in the [place]  · A suicide crisis helpline, such as the National Suicide Prevention Lifeline at 1-866.455.2016. This is open 24 hours a day.    Summary  · Generalized anxiety disorder (COLIN) is a mental health disorder that involves worry that is not triggered by a specific event.  · People with COLIN often worry excessively about many things in their lives, such as their health and  family.  · COLIN may cause physical symptoms such as restlessness, trouble concentrating, sleep problems, frequent sweating, nausea, diarrhea, headaches, and trembling or muscle twitching.  · A mental health specialist can help determine which treatment is best for you. Some people see improvement with one type of therapy. However, other people require a combination of therapies.  This information is not intended to replace advice given to you by your health care provider. Make sure you discuss any questions you have with your health care provider.  Document Released: 04/14/2014 Document Revised: 11/07/2017 Document Reviewed: 11/07/2017  [employer] Interactive Patient Education © 2019 [employer]

## 2019-04-15 NOTE — PROGRESS NOTES
CC: Anxiety, tremors    Danny Vega is a 48 year old male here for follow up for anxiety/depression and tr=witching of eye.  He was started on sertraline and he says it is working really well. He is sleeping better, tremors are improved and anxiety is improved    Chronic problems: htn stable with lisinopril, hyperlipidemia stable with omega 3 fatty acids, Has sleep apnea and uses CPAP, sleeps about 4-6 hours a night       Anxiety   Presents for follow-up visit. Symptoms include nervous/anxious behavior. Patient reports no chest pain, feeling of choking, insomnia, irritability, palpitations or shortness of breath. Symptoms occur occasionally. The severity of symptoms is mild. The quality of sleep is good. Nighttime awakenings: none.     Compliance with medications is %.        The following portions of the patient's history were reviewed and updated as appropriate: allergies, current medications, past family history, past medical history, past social history, past surgical history and problem list.    Review of Systems   Constitutional: Negative for activity change, appetite change and irritability.   Respiratory: Negative for apnea, choking, chest tightness and shortness of breath.    Cardiovascular: Negative for chest pain, palpitations and leg swelling.   Musculoskeletal: Negative for arthralgias and back pain.   Skin: Negative.    Neurological: Negative.    Hematological: Negative for adenopathy. Does not bruise/bleed easily.   Psychiatric/Behavioral: The patient is nervous/anxious. The patient does not have insomnia.    All other systems reviewed and are negative.      Objective   Physical Exam   Constitutional: He is oriented to person, place, and time. Vital signs are normal. He appears well-developed and well-nourished. He is cooperative.   HENT:   Head: Normocephalic and atraumatic.   Right Ear: Hearing normal.   Left Ear: Hearing normal.   Nose: Nose normal.   Mouth/Throat: Uvula is midline and  oropharynx is clear and moist.   Eyes: Lids are normal. Lids are everted and swept, no foreign bodies found.       Cardiovascular: Normal rate, regular rhythm and normal heart sounds.   Pulmonary/Chest: Effort normal and breath sounds normal.   Abdominal: Soft. Normal appearance and bowel sounds are normal.   Lymphadenopathy:        Head (right side): No submental, no submandibular and no tonsillar adenopathy present.        Head (left side): No submental, no submandibular and no tonsillar adenopathy present.   Neurological: He is alert and oriented to person, place, and time. He has normal strength. No cranial nerve deficit or sensory deficit. He displays a negative Romberg sign.   Skin: Skin is warm, dry and intact.   Psychiatric: He has a normal mood and affect. His speech is normal and behavior is normal. Judgment and thought content normal. Cognition and memory are normal.   Nursing note and vitals reviewed.        Assessment/Plan   Danny was seen today for anxiety.    Diagnoses and all orders for this visit:    Eye muscle twitching  Depression with anxiety  -     sertraline (ZOLOFT) 100 MG tablet; Take 1 tablet by mouth Every Night. Take 1 tablet daily      Return in about 6 months (around 10/15/2019).    Ronit Mccabe, DNP, APRN-BC  04/15/2019

## 2019-06-10 DIAGNOSIS — I10 ESSENTIAL HYPERTENSION: ICD-10-CM

## 2019-06-10 RX ORDER — LISINOPRIL 10 MG/1
TABLET ORAL
Qty: 90 TABLET | Refills: 1 | Status: SHIPPED | OUTPATIENT
Start: 2019-06-10 | End: 2019-10-15 | Stop reason: SDUPTHER

## 2019-07-05 ENCOUNTER — NURSE TRIAGE (OUTPATIENT)
Dept: CALL CENTER | Facility: HOSPITAL | Age: 49
End: 2019-07-05

## 2019-07-05 ENCOUNTER — TELEPHONE (OUTPATIENT)
Dept: GASTROENTEROLOGY | Age: 49
End: 2019-07-05

## 2019-07-05 NOTE — TELEPHONE ENCOUNTER
"Caller wanting a script called in , will wait till Monday, has enough  For the weekend    Reason for Disposition  • Caller has medication question only, adult not sick, and triager answers question    Additional Information  • Negative: Drug overdose and nurse unable to answer question  • Negative: Caller requesting information not related to medicine  • Negative: Caller requesting a prescription for Strep throat and has a positive culture result  • Negative: Rash while taking a medication or within 3 days of stopping it  • Negative: Immunization reaction suspected  • Negative: [1] Asthma and [2] having symptoms of asthma (cough, wheezing, etc)  • Negative: MORE THAN A DOUBLE DOSE of a prescription or over-the-counter (OTC) drug  • Negative: [1] DOUBLE DOSE (an extra dose or lesser amount) of over-the-counter (OTC) drug AND [2] any symptoms (e.g., dizziness, nausea, pain, sleepiness)  • Negative: [1] DOUBLE DOSE (an extra dose or lesser amount) of prescription drug AND [2] any symptoms (e.g., dizziness, nausea, pain, sleepiness)  • Negative: Took another person's prescription drug  • Negative: [1] DOUBLE DOSE (an extra dose or lesser amount) of prescription drug AND [2] NO symptoms (Exception: a double dose of antibiotics)  • Negative: Diabetes drug error or overdose (e.g., insulin or extra dose)  • Negative: [1] Request for URGENT new prescription or refill of \"essential\" medication (i.e., likelihood of harm to patient if not taken) AND [2] triager unable to fill per unit policy  • Negative: [1] Prescription not at pharmacy AND [2] was prescribed today by PCP  • Negative: Pharmacy calling with prescription questions and triager unable to answer question  • Negative: Caller has URGENT medication question about med that PCP prescribed and triager unable to answer question  • Negative: Caller has NON-URGENT medication question about med that PCP prescribed and triager unable to answer question  • Negative: Caller " "requesting a NON-URGENT new prescription or refill and triager unable to refill per unit policy  • Negative: Caller has medication question about med not prescribed by PCP and triager unable to answer question (e.g., compatibility with other med, storage)  • Negative: [1] DOUBLE DOSE (an extra dose or lesser amount) of over-the-counter (OTC) drug AND [2] NO symptoms  • Negative: [1] DOUBLE DOSE (an extra dose or lesser amount) of antibiotic drug AND [2] NO symptoms    Answer Assessment - Initial Assessment Questions  1. SYMPTOMS: \"Do you have any symptoms?\"      no  2. SEVERITY: If symptoms are present, ask \"Are they mild, moderate or severe?\"      mild    Protocols used: MEDICATION QUESTION CALL-ADULT-      "

## 2019-07-08 DIAGNOSIS — K21.9 GASTROESOPHAGEAL REFLUX DISEASE WITHOUT ESOPHAGITIS: ICD-10-CM

## 2019-07-08 RX ORDER — RABEPRAZOLE SODIUM 20 MG/1
20 TABLET, DELAYED RELEASE ORAL DAILY
Qty: 90 TABLET | Refills: 3 | Status: SHIPPED | OUTPATIENT
Start: 2019-07-08 | End: 2020-08-18

## 2019-07-08 RX ORDER — RABEPRAZOLE SODIUM 20 MG/1
20 TABLET, DELAYED RELEASE ORAL DAILY
Qty: 90 TABLET | Refills: 1 | Status: SHIPPED | OUTPATIENT
Start: 2019-07-08 | End: 2019-10-15 | Stop reason: SDUPTHER

## 2019-08-20 ENCOUNTER — OFFICE VISIT (OUTPATIENT)
Dept: FAMILY MEDICINE CLINIC | Facility: CLINIC | Age: 49
End: 2019-08-20

## 2019-08-20 VITALS
WEIGHT: 312.8 LBS | DIASTOLIC BLOOD PRESSURE: 87 MMHG | BODY MASS INDEX: 42.37 KG/M2 | SYSTOLIC BLOOD PRESSURE: 147 MMHG | HEIGHT: 72 IN | OXYGEN SATURATION: 98 % | RESPIRATION RATE: 18 BRPM | TEMPERATURE: 98.6 F | HEART RATE: 76 BPM

## 2019-08-20 DIAGNOSIS — J40 BRONCHITIS: ICD-10-CM

## 2019-08-20 DIAGNOSIS — J01.00 ACUTE NON-RECURRENT MAXILLARY SINUSITIS: Primary | ICD-10-CM

## 2019-08-20 PROCEDURE — 99213 OFFICE O/P EST LOW 20 MIN: CPT | Performed by: NURSE PRACTITIONER

## 2019-08-20 RX ORDER — BENZONATATE 200 MG/1
200 CAPSULE ORAL 3 TIMES DAILY PRN
Qty: 40 CAPSULE | Refills: 0 | Status: SHIPPED | OUTPATIENT
Start: 2019-08-20 | End: 2019-10-15

## 2019-08-20 RX ORDER — AMOXICILLIN AND CLAVULANATE POTASSIUM 875; 125 MG/1; MG/1
1 TABLET, FILM COATED ORAL 2 TIMES DAILY
Qty: 14 TABLET | Refills: 0 | Status: SHIPPED | OUTPATIENT
Start: 2019-08-20 | End: 2019-08-27

## 2019-08-20 RX ORDER — PREDNISONE 20 MG/1
20 TABLET ORAL DAILY
Qty: 7 TABLET | Refills: 0 | Status: SHIPPED | OUTPATIENT
Start: 2019-08-20 | End: 2019-08-27

## 2019-08-20 NOTE — PATIENT INSTRUCTIONS

## 2019-08-20 NOTE — PROGRESS NOTES
Back Pain (Acute Or Chronic)    Back pain is one of the most common problems The good news is that most people feel better in 1 to 2 weeks, and most of the rest in 1 to 2 months. Most people can remain active.  Symptoms  People experience and describe pain differently; not everyone is the same.  · The pain can be sharp, stabbing, shooting, aching, cramping or burning.  · Movement, standing, bending, lifting, sitting, or walking may worsen pain.  · It can be localized to one spot or area, or it can be more generalized.  · It can spread or radiate upwards, to the front, or go down your arms or legs (sciatica).  · It can cause muscle spasm.  Causes  Most of the time, \"mechanical problems\" with the muscles or spine cause the pain. Mechanical problems are usually caused by an injury to the muscles or ligaments. While illness can cause back pain, it is usually not caused by a serious illness.  · Physical activity such as sports, exercise, work, or normal activity  · Overexertion, lifting, pushing, pulling incorrectly or too aggressively  · Sudden twisting, bending, or stretching from an accident, or accidental movement  · Poor posture  · Stretching or moving wrong, without noticing pain at the time  · Poor coordination, lack of regular exercise (check with your doctor about this)  · Spinal disc disease or arthritis  · Stress  Pain can also be related to pregnancy, or illness like appendicitis, bladder or kidney infections, pelvic infections, and many other things.     Acute back pain usually gets better in 1 to 2 weeks. Back pain related to disk disease, arthritis in the spinal joints or spinal stenosis (narrowing of the spinal canal) can become chronic and last for months or years.  Unless you had a physical injury (for example, a car accident or fall) X-rays are usually not needed for the initial evaluation of back pain. If pain continues and does not respond to medical treatment, X-rays and other tests may be done    Chief Complaint   Patient presents with   • URI     Cough, congestion and headaches.          No Known Allergies    HPI:  Danny Vega is a 49 y.o. male presents today with complaints of cough, congestion and headache since Saturday and has gotten progressively worse over the last 12 hours.  He says he is on midnights and he has taken any otc that they had at home.  Nyquil, tylenol cold and flu, with some relief.  Had a fever up to 100.4.  Use motrin and ibuprofen with some relief  Says the cough is non productive     Chronic problems:  HTN stable with lisinopril with mild elevation today of 147/87, pulse 76, depression stable with sertraline, Gerd stable with aciphex.    Denies: tobacco, alcohol or drug use/abuse    Does not have advance directive    Past Medical History:   Diagnosis Date   • GERD (gastroesophageal reflux disease)    • Hyperlipidemia    • Hypertension      Past Surgical History:   Procedure Laterality Date   • APPENDECTOMY       Social History     Socioeconomic History   • Marital status:      Spouse name: Not on file   • Number of children: Not on file   • Years of education: Not on file   • Highest education level: Not on file   Tobacco Use   • Smoking status: Never Smoker   • Smokeless tobacco: Never Used   Substance and Sexual Activity   • Alcohol use: No   • Drug use: No   • Sexual activity: Defer     Family History   Problem Relation Age of Onset   • Hypertension Mother    • Hypertension Father    • No Known Problems Sister    • No Known Problems Daughter    • Diabetes Maternal Grandmother    • Hypertension Maternal Grandmother    • Dementia Maternal Grandmother    • Cancer Maternal Grandfather    • Diabetes Paternal Grandmother    • Cancer Paternal Grandfather        Current Outpatient Medications on File Prior to Visit   Medication Sig Dispense Refill   • aspirin 81 MG EC tablet Take 81 mg by mouth Daily.     • glucosamine sulfate 500 MG capsule capsule Take  by mouth Daily.    "  • lisinopril (PRINIVIL,ZESTRIL) 10 MG tablet TAKE 1 TABLET DAILY 90 tablet 1   • Multiple Vitamins-Minerals (MULTIVITAMIN PO) Take  by mouth Daily.     • RABEprazole (ACIPHEX) 20 MG EC tablet Take 1 tablet by mouth Daily. 90 tablet 1   • sertraline (ZOLOFT) 100 MG tablet Take 1 tablet by mouth Every Night. Take 1 tablet daily 90 tablet 1     No current facility-administered medications on file prior to visit.         ROS:  REVIEW OF SYMPTOMS: (Positives bolded)  General:  weight loss, fever, chills, night sweats, fatigue, appetite loss  HEENT:  sore throat tinnitus, bloody nose, hearing loss, sinus pain/pressure, ear pain/pressure.   Respiratory: shortness of breath, cough, hemoptysis, wheezing, pleurisy,   Cardiovascular:  chest pain, PND, palpitation, edema, orthopnea, syncope, swelling of extremities  Gastro: Nausea, vomiting, diarrhea, hematemesis, abdominal pain, constipation  Neuro:  Migraine, numbness, ataxia, tremor, vertigo, weakness, memory loss, Irritability, dizziness  Allergic/immune: allergic rhinitis, hay fever, asthma, hives  \"All other systems reviewed and negative, except as listed above.”      OBJECTIVE:  Constitutional:  Alert, oriented x 3, well developed, well nourished. Consistent with stated age. Not in acute distress.  Has normal posture. Gait and station normal. .  Behavior appropriate. Patient is pleasant and cooperative with the interview and exam.    Skin: No rashes, no visible scars or suspicious moles noted.  Skin is warm to touch. Normal appropriate skin turgor.  Capillary refill is normal bilateral Upper and lower extremity.     Head/Neck: Head is normocephalic and atraumatic.  Neck without visible/palpable lumps.  No thyromegaly.    Eye: Bilaterally EOMI.  PERRLA.  Sclera/conjunctiva is normal, no discharge. Cornea is normal and clear. Lens is normal.  Eyeball normal. Upper eyelid normal.  Lower eyelid normal.   Abnormal: allergic shiners, gerri-orbital puffiness    OROPHARYNX: " at a later time.  Home care  Try these home care recommendations:  1. When in bed, try to find a position of comfort. A firm mattress is best. Try lying flat on your back with pillows under your knees. You can also try lying on your side with your knees bent up towards your chest and a pillow between your knees.  2. At first, do not try to stretch out the sore spots. If there is a strain, it is not like the good soreness you get after exercising without an injury. In this case, stretching may make it worse.  3. Avoid prolong sitting, long car rides, or travel. This puts more stress on the lower back than standing or walking.  4. During the first 24 to 72 hours after an acute injury or flare up of chronic back pain, apply an ice pack to the painful area for 20 minutes and then remove it for 20 minutes over a period of 60 to 90 minutes or several times a day. This will reduce swelling and pain. Wrap the ice pack in a think towel or plastic to protect your skin.  5. You can start with ice, then switch to heat. Heat (hot shower, hot bath, or heating pad) reduces swelling and pain and works well for muscle spasms. Heat can be applied to the painful area for 20 minutes then remove it for 20 minutes over a period of 60 to 90 minutes or several times a day. Do not sleep on a heating pad. It can lead to skin burns or tissue damage.  6. You can alternate ice and heat therapy. Talk with your doctor about the best treatment for your back pain.  7. Therapeutic massage can help relax the back muscles without stretching them.  8. Be aware of safe lifting methods and do not lift anything without stretching first.  Medicines  Talk to your doctor before using medications, especially if you have other medical problems or are taking other medicines.  · You may use acetaminophen or ibuprofen to control pain, unless another pain medicine was prescribed. If you have chronic conditions like diabetes, liver or kidney disease, stomach  Mucosa pink and moist, posterior pharynx erythematous,  No tonsillar pillards or exudate,. Dentition average for age. No obvious dental carries. No lesions. Tongue normal.    EARS: Bilateral auditory canal normal, without redness or cerumen impaction. Bilateral Tympanic membrane Normal, pearly gray with good cone of light and landmarks.  Hearing grossly intact to normal conversation.     NOSE: Purulent drainage, mucosa is erythematous, edematous and congested, nares patent    SINUS:  Frontal and maxillary sinus tenderness on palpation.      CHEST/LUNG: No use of accessory muscles, chest non-tender on palpation.  Breath sounds normal throughout all lung fields. No wheezes, rales, rhonchi.    CARDIOVASCULAR:  Inspection: Carotid artery bilateral normal, no bruits, pulse regular.  Palpation/Percussion Bilateral normal pulsations.  Auscultation: Regular rate and rhythm. No murmur noted in sitting, supine, standing or squatting positions.    LYMPH: Cervical Nodes-normal, size; non-tender to palpation. Axillary Nodes- normal size; non-tender to palpation.     Assessment:  Danny was seen today for uri.    Diagnoses and all orders for this visit:    Acute non-recurrent maxillary sinusitis  -     amoxicillin-clavulanate (AUGMENTIN) 875-125 MG per tablet; Take 1 tablet by mouth 2 (Two) Times a Day for 7 days.    Bronchitis  -     predniSONE (DELTASONE) 20 MG tablet; Take 1 tablet by mouth Daily for 7 days.  -     benzonatate (TESSALON) 200 MG capsule; Take 1 capsule by mouth 3 (Three) Times a Day As Needed for Cough.      • Adjunctive Therapy  a. Intranasal saline irrigation with either physiologic or hypertonic saline is recommended  b. Intranasal corticosteroids in addition to antibiotics  c. Netti pot  d. Good handwashing  e. If smoke-recommend smoking cessation  f.  Humidify the air; steam inhalation and warm compresses often help relieve pressure  g. Increase fluid intake  h. Sleep with bed elevated  i. Avoid allergens  ulcers, or gastrointestinal bleeding, or are taking blood thinners talk to your doctor before taking any medication.  · Be careful if you are given a prescription medicines, narcotics, or medication for muscle spasms. They can cause drowsiness, affect your coordination, reflexes, and judgement. Do not drive or operate heavy machinery.  Follow-up care  Follow up with your doctor or this facility if your symptoms do not start to improve after one week. Physical therapy may be needed.  If X-rays were taken, they will be reviewed by a radiologist. You will be notified of any new findings that may affect your care.  Call 911  Call emergency services if any of the following occur:  · Trouble breathing  · Confusion  · Very drowsy or trouble awakening  · Fainting or loss of consciousness  · Rapid or very slow heart rate  · Loss of bowel or bladder control  When to seek medical care  Get prompt medical attention if any of the following occur:  · Pain becomes worse or spreads to your legs  · Weakness or numbness in one or both legs  · Numbness in the groin or genital area  © 9053-9368 [employer]. [address] [place] [address], [place] [address]. All rights reserved. This information is not intended as a substitute for professional medical care. Always follow your healthcare professional's instructions.               and excessively dry heat  j. Avoid swimming/diving and air travel during acute period  k. Avoid use of antihistamines unless there is an allergic basis   l. Teach proper application of nasal sprays      I spoke with the patient about the benefits and risks associated with antibiotic therapy; the benefits include treating a bacterial infection, preventing the spread of disease, and minimizing serious complications associated with bacterial diseases; the risks include antibiotic resistance, allergic reactions, oral and/ or vaginal candidia, and GI related side effects such as an upset stomach and diarrhea, as well as placing the patient at an increase risk for C-diff; verbal acknowledgement of these risk were obtained; based on the patients complaint and clinical finding, no antibiotics are required at this time.             Return in about 1 week (around 8/27/2019), or if symptoms worsen or fail to improve.    Electronically signed by Ronit Mccabe, YURIY, APRN, 08/20/19, 8:22 AM.

## 2019-09-24 DIAGNOSIS — F41.8 DEPRESSION WITH ANXIETY: ICD-10-CM

## 2019-09-24 RX ORDER — SERTRALINE HYDROCHLORIDE 100 MG/1
TABLET, FILM COATED ORAL
Qty: 90 TABLET | Refills: 4 | Status: SHIPPED | OUTPATIENT
Start: 2019-09-24 | End: 2019-10-15 | Stop reason: SDUPTHER

## 2019-10-15 ENCOUNTER — OFFICE VISIT (OUTPATIENT)
Dept: FAMILY MEDICINE CLINIC | Facility: CLINIC | Age: 49
End: 2019-10-15

## 2019-10-15 VITALS
OXYGEN SATURATION: 98 % | HEART RATE: 69 BPM | DIASTOLIC BLOOD PRESSURE: 86 MMHG | RESPIRATION RATE: 18 BRPM | OXYGEN SATURATION: 98 % | BODY MASS INDEX: 42.66 KG/M2 | WEIGHT: 315 LBS | SYSTOLIC BLOOD PRESSURE: 125 MMHG | HEIGHT: 72 IN | DIASTOLIC BLOOD PRESSURE: 86 MMHG | TEMPERATURE: 98 F | SYSTOLIC BLOOD PRESSURE: 125 MMHG | HEIGHT: 72 IN | RESPIRATION RATE: 18 BRPM | HEART RATE: 69 BPM | WEIGHT: 315 LBS | BODY MASS INDEX: 42.66 KG/M2 | TEMPERATURE: 98 F

## 2019-10-15 DIAGNOSIS — E66.01 OBESITY, MORBID, BMI 40.0-49.9 (HCC): ICD-10-CM

## 2019-10-15 DIAGNOSIS — K21.9 GASTROESOPHAGEAL REFLUX DISEASE WITHOUT ESOPHAGITIS: ICD-10-CM

## 2019-10-15 DIAGNOSIS — Z13.1 SCREENING FOR DIABETES MELLITUS: ICD-10-CM

## 2019-10-15 DIAGNOSIS — Z12.5 ENCOUNTER FOR PROSTATE CANCER SCREENING: ICD-10-CM

## 2019-10-15 DIAGNOSIS — I10 ESSENTIAL HYPERTENSION: Primary | ICD-10-CM

## 2019-10-15 DIAGNOSIS — Z13.220 SCREENING FOR HYPERLIPIDEMIA: ICD-10-CM

## 2019-10-15 DIAGNOSIS — Z13.6 SCREENING FOR CARDIOVASCULAR CONDITION: ICD-10-CM

## 2019-10-15 DIAGNOSIS — F41.8 DEPRESSION WITH ANXIETY: ICD-10-CM

## 2019-10-15 DIAGNOSIS — Z00.01 ENCOUNTER FOR WELL ADULT EXAM WITH ABNORMAL FINDINGS: ICD-10-CM

## 2019-10-15 DIAGNOSIS — N52.9 ERECTILE DYSFUNCTION, UNSPECIFIED ERECTILE DYSFUNCTION TYPE: ICD-10-CM

## 2019-10-15 DIAGNOSIS — E66.01 OBESITY, MORBID (HCC): Primary | ICD-10-CM

## 2019-10-15 DIAGNOSIS — M25.511 ACUTE PAIN OF RIGHT SHOULDER: ICD-10-CM

## 2019-10-15 PROCEDURE — 99396 PREV VISIT EST AGE 40-64: CPT | Performed by: NURSE PRACTITIONER

## 2019-10-15 PROCEDURE — 99214 OFFICE O/P EST MOD 30 MIN: CPT | Performed by: NURSE PRACTITIONER

## 2019-10-15 RX ORDER — TIZANIDINE HYDROCHLORIDE 4 MG/1
4 CAPSULE, GELATIN COATED ORAL 2 TIMES DAILY
Qty: 60 CAPSULE | Refills: 0 | Status: SHIPPED | OUTPATIENT
Start: 2019-10-15 | End: 2019-10-15

## 2019-10-15 RX ORDER — LISINOPRIL 10 MG/1
10 TABLET ORAL DAILY
Qty: 90 TABLET | Refills: 1 | Status: SHIPPED | OUTPATIENT
Start: 2019-10-15 | End: 2020-04-15 | Stop reason: SDUPTHER

## 2019-10-15 RX ORDER — TIZANIDINE HYDROCHLORIDE 4 MG/1
4 CAPSULE, GELATIN COATED ORAL 2 TIMES DAILY
Qty: 60 CAPSULE | Refills: 0 | Status: SHIPPED | OUTPATIENT
Start: 2019-10-15 | End: 2019-12-23 | Stop reason: SDUPTHER

## 2019-10-15 RX ORDER — PREDNISONE 20 MG/1
20 TABLET ORAL DAILY
Qty: 7 TABLET | Refills: 0 | Status: SHIPPED | OUTPATIENT
Start: 2019-10-15 | End: 2019-10-22

## 2019-10-15 RX ORDER — SERTRALINE HYDROCHLORIDE 100 MG/1
150 TABLET, FILM COATED ORAL NIGHTLY
Qty: 135 TABLET | Refills: 1 | Status: SHIPPED | OUTPATIENT
Start: 2019-10-15 | End: 2020-04-15

## 2019-10-15 RX ORDER — RABEPRAZOLE SODIUM 20 MG/1
20 TABLET, DELAYED RELEASE ORAL DAILY
Qty: 90 TABLET | Refills: 1 | Status: SHIPPED | OUTPATIENT
Start: 2019-10-15 | End: 2019-12-04 | Stop reason: SDUPTHER

## 2019-10-15 NOTE — PROGRESS NOTES
Chief Complaint   Patient presents with   • Annual Exam     Pt here for annual physical      No Known Allergies    HPI:  Danny Vega is a 49 y.o. male presents today for well exam.  He is working midnights and says he is tired.  He has been having problems with sex drive and performance and worried about prostate cancer because grandfather had prostate cancer.  He complains of pain in his right shoulder but denies injury.  Says it hurts to move, but he can do all the moves without difficulty and rates pain 8/10 worse at night.  He would also like his sugar checked because he had someone with meter and checked his and it was elevated    Chronic problems:   HTN controlled with lisinopril, GERD controlled with aciphex, depression stable with sertraline.      Past Medical History:   Diagnosis Date   • GERD (gastroesophageal reflux disease)    • Hyperlipidemia    • Hypertension      Past Surgical History:   Procedure Laterality Date   • APPENDECTOMY       Social History     Socioeconomic History   • Marital status:      Spouse name: Not on file   • Number of children: Not on file   • Years of education: Not on file   • Highest education level: Not on file   Tobacco Use   • Smoking status: Never Smoker   • Smokeless tobacco: Never Used   Substance and Sexual Activity   • Alcohol use: No   • Drug use: No   • Sexual activity: Defer     Family History   Problem Relation Age of Onset   • Hypertension Mother    • Hypertension Father    • No Known Problems Sister    • No Known Problems Daughter    • Diabetes Maternal Grandmother    • Hypertension Maternal Grandmother    • Dementia Maternal Grandmother    • Cancer Maternal Grandfather    • Diabetes Paternal Grandmother    • Cancer Paternal Grandfather        Current Outpatient Medications on File Prior to Visit   Medication Sig Dispense Refill   • aspirin 81 MG EC tablet Take 81 mg by mouth Daily.     • benzonatate (TESSALON) 200 MG capsule Take 1 capsule by mouth 3  "(Three) Times a Day As Needed for Cough. 40 capsule 0   • glucosamine sulfate 500 MG capsule capsule Take  by mouth Daily.     • lisinopril (PRINIVIL,ZESTRIL) 10 MG tablet TAKE 1 TABLET DAILY 90 tablet 1   • Multiple Vitamins-Minerals (MULTIVITAMIN PO) Take  by mouth Daily.     • RABEprazole (ACIPHEX) 20 MG EC tablet Take 1 tablet by mouth Daily. 90 tablet 1   • sertraline (ZOLOFT) 100 MG tablet TAKE 1 TABLET EVERY NIGHT DAILY 90 tablet 4     No current facility-administered medications on file prior to visit.         REVIEW OF SYMPTOMS: (Positives bolded)  General:  weight loss, fever, chills, night sweats, fatigue, appetite loss  HEENT:  blurry vision, eye pain, eye discharge, dry eyes, decreased vision  Respiratory: shortness of breath, cough, hemoptysis, wheezing, pleurisy,   Cardiovascular:  chest pain, PND, palpitation, edema, orthopnea, syncope, swelling of extremities  Gastro: Nausea, vomiting, diarrhea, hematemesis, abdominal pain, constipation  Genito: hematuria, dysuria, glycosuria, hesitancy, frequency, incontinence  Skin: rash, pruritis, sores, nail changes, skin thickening, change in wart/mole, itching, rash, new lesions, pruritus, nail changes  Musco: shoulder pain, weakness arm   Neuro:  Migraine, numbness, ataxia, tremor, vertigo, weakness, memory loss, Irritability, dizziness  Endocrine: excessive thirst, polyuria, cold intolerance, heat intolerance, goiter  Psychiatric: depression, anxiety, anti-depressants, alcohol abuse, drug abuse,   \"All other systems reviewed and negative, except as listed above.”    The following portions of the patient's history were reviewed and updated as appropriate: allergies, current medications, past family history, past medical history, past social history, past surgical history and problem list.    OBJECTIVE:  Constitutional:  NAD,  Oriented x 3, alert Posture-Not doubled over, Well developed and well nourished.  Patient is pleasant and cooperative with the " interview and exam.    Integumentary: no rashes, ulcers or lesions. No edema.       Head/Neck: normocephalic and atraumatic.  No visible/palpable lumps or pulsations.  Normal cervical ROM. Neck Supple.  Thyroid-No thyromegaly, no nodules    Eye: Bilaterally PERRLA, EOMI.  No discharge.  Upper and lower eyelids are normal. Sclera/conjunctiva normal without discharge. Cornea is normal and clear. Lens is normal.  Eyeball appears normal. No ciliary flushing, no conjunctival injection.    ENMT: Bilateral canals normal without erythema or discharge, no excessive cerumen. TM'S bilateral grey/pearly, normal light reflex and anatomy, No sinus tenderness along frontal/maxillary region, oral mucosa pink and moist. Dentition assessed and discussed appropriate oral care. Tongue normal midline.  no pharyngeal erythema, Uvula midline. No post nasal drip. No exudate.     CHEST/LUNG: Breath sounds normal throughout all lung fields.  No wheezes, rales, rhonchi.     CARDIOVASCULAR: normal, no bruits or abnormal pulsations. Regular rate and rhythm. No murmur noted in sitting, supine positions. no digital clubbing, cyanosis, edema, increased warmth.    ABDOMEN: normal and no visible pulsations. Normal contour. Bowel sounds normal, no abdominal bruits. Abdomen soft, non-tender, no rebound tenderness, no rigidity (guarding), no jar tenderness, no masses.  No hepatomegaly, no splenomegaly, no hernias.     Musculoskeletal: Generalized-No generalized swelling or edema of extremities, no digital clubbing or cyanosis, neurovascularly intact all four extremities.  R Upper extremity- Symmetrical posture.  No visible deformity.  Normal sensation along medial and lateral upper extremity proximally and distally.  has tenderness on palpation of the whole shoulder, and has a popping in it.   5/5 and strength 5/5 bilateral UE.  Elbow palpated, no tenderness overlying olecranon.  Normal supination, pronation to active/passive ROM and to resisted  "rotation. Bicep insertion/tricep insertion appear normal without obvious pathology. Rotator cuff evaluated and intact.  Normal wrist ROM bilaterally. Normal hand movement, intrinsic muscles of hands normal. No tenderness to palpation of hands/wrists/elbows.  .    Neuropsych:  Able to articulate well. Normal speech, normal rate, normal tone, normal use of language, volume and coherence.  no SI/HI, no hallucinations, delusions, obsessions.  Memory l intact, remote and recent memory intact.  Age appropriate fund of knowledge, concentration and attention span normal.    Lymphatic: Head/Neck- normal size and non tender to palpation. Axillary- Head and neck LN are normal size and non tender to palpation. Femoral and Inguinal- normal size and non tender to palpation.  /86 (BP Location: Left arm, Patient Position: Sitting, Cuff Size: Adult)   Pulse 69   Temp 98 °F (36.7 °C) (Oral)   Resp 18   Ht 182.9 cm (72\")   Wt (!) 144 kg (318 lb)   SpO2 98%   BMI 43.13 kg/m²          ASSESSMENT/PLAN  Danny was seen today for annual exam.    Diagnoses and all orders for this visit:    Obesity, morbid (CMS/HCC)    Encounter for well adult exam with abnormal findings  -     CBC & Differential  -     Comprehensive metabolic panel  -     Lipid panel  -     Hemoglobin A1c    Screening for diabetes mellitus  -     Hemoglobin A1c    Screening for hyperlipidemia  -     Lipid panel    Screening for cardiovascular condition  -     CBC & Differential  -     Comprehensive metabolic panel    Encounter for prostate cancer screening  -     PSA Screen    Erectile dysfunction, unspecified erectile dysfunction type  -     PSA Screen        Health:  Exercise daily at least 30 minutes 3x week  Lose weight- decrease calories in diet, eat healthier    Health Maintenance Summary    Postponed - TDAP/TD VACCINES; Postponed until 10/15/2019; (1 - Tdap)   10/15/2019  Postponed Until 10/15/2019 by Ronit Mccabe, DNP, APRN (Patient Refused) "   12/06/2017  Postponed Until 1/19/2018 by Margaret Jha MA (Patient Refused)     LIPID PANEL; Next due on 10/15/2020; (Yearly)   10/15/2019  Completion Done   12/12/2018  Completion LIPID PANEL   12/19/2016  Completion LIPID PANEL     ANNUAL PHYSICAL; Next due on 10/16/2020; (Yearly)   10/15/2019  Completion Done     Discontinued - INFLUENZA VACCINE; Discontinued   12/12/2018  Frequency Changed To Never by Ronit Mccabe DNP, CHUY (Does not take vaccines)   12/06/2017  Completion Declined   11/04/2013  Completion Imm Admin: Influenza, Unspecified   11/04/2013  Completion Imm Admin: Influenza TIV (IM)     Patient Counseling:  --Nutrition: Stressed importance of moderation in sodium/caffeine intake, saturated fat and cholesterol, caloric balance, sufficient intake of fresh fruits, vegetables, fiber, calcium, iron,   --Exercise: Stressed the importance of regular exercise.   --Sexuality: Briefly discussed.  Not sexually active. Patient safety discussed.   --Injury prevention: Discussed safety belts, safety helmets, smoke detector, smoking near bedding or upholstery.   --Dental health: Discussed importance of regular tooth brushing, flossing, and dental visits.  --Immunizations reviewed.  --After hours service discussed with patient    Obesity, Morbid BMI 43.1 weight 318 pounds, try to lose 3 pounds before next visit, increase exercise and decrease fats and carbs    Return in about 1 year (around 10/15/2020) for Annual physical.    Electronically signed by Ronit Mccabe DNP, APRN, 10/15/19, 8:12 AM.

## 2019-10-15 NOTE — PROGRESS NOTES
CC: HTN, heartburn, depression    Hypertension   Pertinent negatives include no chest pain, palpitations or shortness of breath.   Depression Patient is not experiencing: decreased concentration, depressed mood, palpitations, shortness of breath and suicidal ideas.    Heartburn   He reports no chest pain or no wheezing. Pertinent negatives include no fatigue.        The following portions of the patient's history were reviewed and updated as appropriate: allergies, current medications, past family history, past medical history, past social history, past surgical history and problem list.    Review of Systems   Constitutional: Negative for activity change, chills, fatigue and fever.   Respiratory: Negative for chest tightness, shortness of breath and wheezing.    Cardiovascular: Negative for chest pain, palpitations and leg swelling.   Musculoskeletal: Positive for arthralgias and myalgias.   Skin: Negative.    Allergic/Immunologic: Positive for environmental allergies. Negative for food allergies and immunocompromised state.   Psychiatric/Behavioral: Negative for behavioral problems, decreased concentration, self-injury, suicidal ideas and depressed mood.   All other systems reviewed and are negative.      Objective   Physical Exam   Constitutional: He is oriented to person, place, and time. Vital signs are normal. He appears well-developed and well-nourished. He is cooperative.   HENT:   Head: Normocephalic and atraumatic.   Right Ear: Hearing normal.   Left Ear: Hearing normal.   Nose: Nose normal.   Mouth/Throat: Oropharynx is clear and moist.   Eyes: Conjunctivae are normal. Pupils are equal, round, and reactive to light.   Fundoscopic exam:       The right eye shows no AV nicking, no exudate and no hemorrhage.        The left eye shows no AV nicking, no exudate and no hemorrhage.   Cardiovascular: Normal rate, regular rhythm and normal heart sounds.   Pulmonary/Chest: Effort normal and breath sounds normal.    Abdominal: Soft. Normal appearance and bowel sounds are normal.   Lymphadenopathy:        Head (right side): No submental and no submandibular adenopathy present.        Head (left side): No submental and no submandibular adenopathy present.   Neurological: He is alert and oriented to person, place, and time. He has normal strength. No cranial nerve deficit or sensory deficit.   Skin: Skin is warm, dry and intact.   Psychiatric: He has a normal mood and affect. His speech is normal. Cognition and memory are normal.   Nursing note and vitals reviewed.        Assessment/Plan   Danny was seen today for depression.    Diagnoses and all orders for this visit:    Essential hypertension  -     lisinopril (PRINIVIL,ZESTRIL) 10 MG tablet; Take 1 tablet by mouth Daily.    Gastroesophageal reflux disease without esophagitis  -     RABEprazole (ACIPHEX) 20 MG EC tablet; Take 1 tablet by mouth Daily.    Depression with anxiety  -     sertraline (ZOLOFT) 100 MG tablet; Take 1.5 tablets by mouth Every Night.    Acute pain of right shoulder  -     TiZANidine (ZANAFLEX) 4 MG capsule; Take 1 capsule by mouth 2 (Two) Times a Day.  -     predniSONE (DELTASONE) 20 MG tablet; Take 1 tablet by mouth Daily for 7 days.    Obesity, morbid, BMI 40.0-49.9 (CMS/HCC)BMI43.1 wieg 318           Lose 3 ppounds before next visit    The patient BMI is outside this range and we recommended/discussed today to utilize a diet/exercise program to get back into the appropriate range.  Federal guidelines recommend that people under the age of 65 should have a BMI of 18.5-24.9  The initial step is to document everything that is consumed into a food diary. Studies have shown that patients can lose up to 2x the weight by keeping track of foods.   Choose one bad food weekly and eliminate it from your diet.  Replace with one healthy food  Goal over next 2-4 weeks is walk 30 minutes per day 5 days per week at pace difficult to hold conversation.   Drink  more water, less soda.   Cut back on portion sizes.   Today we encouraged roughly a 1 lb per week weight loss with initial goal of 5% weight loss.  Discussed if eating out for a meal, consider cutting food in half and placing into a to go container.  Individually portion any foods coming into the home based on package.  Use smaller plates  Drinking 12 oz of water 30 minutes before meal as way to suppress appetite.  Medications discussed today include metformin, topamax, phentermine, Qsymia, Belviq (lorcaserin), Contrave (Buproprion/naltrexone).      Lifestyle therapy   Simple advice to lose weight   Internet programs or self help books.    Advice from dietitian  Set Goals that are realistic   Encouraged to use visual aides to help in measuring foods  Baseball-1 cup good for green salad, frozen yogurt, medium piece of fruit, baked potato  Handful - ½ cup good cut fruit, cooked vegetables, pasta, rice  Egg- ¼ cup good for dried fruit  Deck of cards-3 ounces good for meat and poultry  Check book-3 ounces good for grilled fish  6 dice side by side- 1 ½ ounces good for natural cheese  Simple tips   Plate method-reduce plate size to 9 inch dinner plate.  Half of plate should be filled with non-starchy vegetables (broccoli, lettuce, cauliflower, tomatoes), ¼ plate with lean source of protein (lean chicken, turkey, fish), remaining ½ with whole grains (brown rice, potato, whole grain breads  Avoid liquid calories (regular soda, juice, coffee with cream)  Focus  on water, seltzer water and other non-calorie drinks  Replace regular sugar with non-caloric sweeteners  Avoid skipping meals: plan small regular meals throughout the day in order to keep your hunger controlled  Consider using meal replacements if unable to plan a healthy meal (protein shake, high protein bar)  Replace all white bread with whole wheat/whole grain alternative  Swap regular salad dressings (mayonnaise, butter, or low fat or fat free alternative)  Avoid  high fat, high calorie, high carbohydrate snakes (cookies, pastries, cakes)  Snack on fruits, low fat dairy (yogurt, cottage cheese)    Behavioral Modification  Self-Monitoring of dietary Intake-keep a dietary intake log. Obese individuals have been shown to underestimate their food intake  Behavioral treatment -gives exacts about amount and total calories  Read food labels        Return in about 6 months (around 4/15/2020) for Recheck chronic .  Electronically signed by Ronit Mccabe, YURIY, APRN, 10/15/19, 8:19 AM.

## 2019-10-15 NOTE — PATIENT INSTRUCTIONS

## 2019-10-16 DIAGNOSIS — E78.2 MIXED HYPERLIPIDEMIA: Primary | ICD-10-CM

## 2019-10-16 LAB
ALBUMIN SERPL-MCNC: 4.8 G/DL (ref 3.5–5.2)
ALBUMIN/GLOB SERPL: 1.6 G/DL
ALP SERPL-CCNC: 64 U/L (ref 39–117)
ALT SERPL-CCNC: 23 U/L (ref 1–41)
AST SERPL-CCNC: 17 U/L (ref 1–40)
BASOPHILS # BLD AUTO: 0.12 10*3/MM3 (ref 0–0.2)
BASOPHILS NFR BLD AUTO: 1.3 % (ref 0–1.5)
BILIRUB SERPL-MCNC: 0.3 MG/DL (ref 0.2–1.2)
BUN SERPL-MCNC: 13 MG/DL (ref 6–20)
BUN/CREAT SERPL: 13.8 (ref 7–25)
CALCIUM SERPL-MCNC: 9.5 MG/DL (ref 8.6–10.5)
CHLORIDE SERPL-SCNC: 96 MMOL/L (ref 98–107)
CHOLEST SERPL-MCNC: 200 MG/DL (ref 0–200)
CO2 SERPL-SCNC: 28.6 MMOL/L (ref 22–29)
CREAT SERPL-MCNC: 0.94 MG/DL (ref 0.76–1.27)
EOSINOPHIL # BLD AUTO: 0.34 10*3/MM3 (ref 0–0.4)
EOSINOPHIL NFR BLD AUTO: 3.8 % (ref 0.3–6.2)
ERYTHROCYTE [DISTWIDTH] IN BLOOD BY AUTOMATED COUNT: 14.2 % (ref 12.3–15.4)
GLOBULIN SER CALC-MCNC: 3 GM/DL
GLUCOSE SERPL-MCNC: 104 MG/DL (ref 65–99)
HBA1C MFR BLD: 5.9 % (ref 4.8–5.6)
HCT VFR BLD AUTO: 46.6 % (ref 37.5–51)
HDLC SERPL-MCNC: 38 MG/DL (ref 40–60)
HGB BLD-MCNC: 15.4 G/DL (ref 13–17.7)
IMM GRANULOCYTES # BLD AUTO: 0.04 10*3/MM3 (ref 0–0.05)
IMM GRANULOCYTES NFR BLD AUTO: 0.4 % (ref 0–0.5)
LDLC SERPL CALC-MCNC: 122 MG/DL (ref 0–100)
LYMPHOCYTES # BLD AUTO: 3.4 10*3/MM3 (ref 0.7–3.1)
LYMPHOCYTES NFR BLD AUTO: 37.5 % (ref 19.6–45.3)
MCH RBC QN AUTO: 29.5 PG (ref 26.6–33)
MCHC RBC AUTO-ENTMCNC: 33 G/DL (ref 31.5–35.7)
MCV RBC AUTO: 89.3 FL (ref 79–97)
MONOCYTES # BLD AUTO: 0.64 10*3/MM3 (ref 0.1–0.9)
MONOCYTES NFR BLD AUTO: 7.1 % (ref 5–12)
NEUTROPHILS # BLD AUTO: 4.52 10*3/MM3 (ref 1.7–7)
NEUTROPHILS NFR BLD AUTO: 49.9 % (ref 42.7–76)
NRBC BLD AUTO-RTO: 0.3 /100 WBC (ref 0–0.2)
PLATELET # BLD AUTO: 274 10*3/MM3 (ref 140–450)
POTASSIUM SERPL-SCNC: 5 MMOL/L (ref 3.5–5.2)
PROT SERPL-MCNC: 7.8 G/DL (ref 6–8.5)
PSA SERPL-MCNC: 0.72 NG/ML (ref 0–4)
RBC # BLD AUTO: 5.22 10*6/MM3 (ref 4.14–5.8)
SODIUM SERPL-SCNC: 139 MMOL/L (ref 136–145)
TRIGL SERPL-MCNC: 202 MG/DL (ref 0–150)
VLDLC SERPL CALC-MCNC: 40.4 MG/DL
WBC # BLD AUTO: 9.06 10*3/MM3 (ref 3.4–10.8)

## 2019-11-13 DIAGNOSIS — M25.511 ACUTE PAIN OF RIGHT SHOULDER: ICD-10-CM

## 2019-11-13 RX ORDER — TIZANIDINE HYDROCHLORIDE 4 MG/1
CAPSULE, GELATIN COATED ORAL
Qty: 60 CAPSULE | Refills: 12 | OUTPATIENT
Start: 2019-11-13

## 2019-11-27 ENCOUNTER — OFFICE VISIT (OUTPATIENT)
Dept: FAMILY MEDICINE CLINIC | Facility: CLINIC | Age: 49
End: 2019-11-27

## 2019-11-27 VITALS
DIASTOLIC BLOOD PRESSURE: 84 MMHG | RESPIRATION RATE: 22 BRPM | TEMPERATURE: 98.5 F | HEART RATE: 87 BPM | HEIGHT: 72 IN | BODY MASS INDEX: 42.66 KG/M2 | SYSTOLIC BLOOD PRESSURE: 131 MMHG | WEIGHT: 315 LBS | OXYGEN SATURATION: 97 %

## 2019-11-27 DIAGNOSIS — B34.9 VIRAL ILLNESS: ICD-10-CM

## 2019-11-27 DIAGNOSIS — R50.9 FEVER, UNSPECIFIED FEVER CAUSE: Primary | ICD-10-CM

## 2019-11-27 LAB
EXPIRATION DATE: NORMAL
FLUAV AG NPH QL: NEGATIVE
FLUBV AG NPH QL: NEGATIVE
INTERNAL CONTROL: NORMAL
Lab: NORMAL

## 2019-11-27 PROCEDURE — 87804 INFLUENZA ASSAY W/OPTIC: CPT | Performed by: NURSE PRACTITIONER

## 2019-11-27 PROCEDURE — 99213 OFFICE O/P EST LOW 20 MIN: CPT | Performed by: NURSE PRACTITIONER

## 2019-11-27 NOTE — PROGRESS NOTES
Chief Complaint   Patient presents with   • Wheezing     Pt reports that he has some wheezing. Pt reports that symptoms started two days ago.        No Known Allergies    HPI: Silvia Delgado is a 49 yr old male here with complains of flu like symptoms, body aches fever, chills and fatigue that started a couple days ago.  He says symptoms have gotten progressively worse over the last 12 hours.     Chronic: depression stable with  Sertraline, GERD stable with aciphex, HTN stable with lisinopril     No Known Allergies  Danny Vega is a 49 y.o. male presents today with   Chief Complaint   Patient presents with   • Wheezing     Pt reports that he has some wheezing. Pt reports that symptoms started two days ago.      Past Medical History:   Diagnosis Date   • GERD (gastroesophageal reflux disease)    • Hyperlipidemia    • Hypertension      Past Surgical History:   Procedure Laterality Date   • APPENDECTOMY       Social History     Socioeconomic History   • Marital status:      Spouse name: Not on file   • Number of children: Not on file   • Years of education: Not on file   • Highest education level: Not on file   Tobacco Use   • Smoking status: Never Smoker   • Smokeless tobacco: Never Used   Substance and Sexual Activity   • Alcohol use: No   • Drug use: No   • Sexual activity: Defer     Family History   Problem Relation Age of Onset   • Hypertension Mother    • Hypertension Father    • No Known Problems Sister    • No Known Problems Daughter    • Diabetes Maternal Grandmother    • Hypertension Maternal Grandmother    • Dementia Maternal Grandmother    • Cancer Maternal Grandfather    • Diabetes Paternal Grandmother    • Cancer Paternal Grandfather      Current Outpatient Medications on File Prior to Visit   Medication Sig Dispense Refill   • aspirin 81 MG EC tablet Take 81 mg by mouth Daily.     • glucosamine sulfate 500 MG capsule capsule Take  by mouth Daily.     • lisinopril (PRINIVIL,ZESTRIL) 10 MG tablet  Take 1 tablet by mouth Daily. 90 tablet 1   • Multiple Vitamins-Minerals (MULTIVITAMIN PO) Take  by mouth Daily.     • RABEprazole (ACIPHEX) 20 MG EC tablet Take 1 tablet by mouth Daily. 90 tablet 1   • sertraline (ZOLOFT) 100 MG tablet Take 1.5 tablets by mouth Every Night. 135 tablet 1   • TiZANidine (ZANAFLEX) 4 MG capsule Take 1 capsule by mouth 2 (Two) Times a Day. 60 capsule 0     No current facility-administered medications on file prior to visit.       ROS:  REVIEW OF SYMPTOMS: (Positives bolded)  General:  weight loss, fever, chills, night sweats, fatigue, appetite loss, malaise  HEENT:   sore throat  sinus pain/pressure, ear pain/pressure.   Respiratory: shortness of breath, cough, hemoptysis, wheezing, pleurisy,   Cardiovascular:  chest pain, PND, palpitation, edema, orthopnea, syncope  Gastro: Nausea, vomiting, diarrhea, hematemesis, abdominal pain, constipation  Genito: hematuria, dysuria, glycosuria, hesitancy, frequency, incontinence  Musckelo: Arthralgia, myalgia, muscle weakness, headache  Skin: rash, pruritis, sores, nail changes, change in wart/mole, itching  Psychiatric: depression, anxiety, anti-depressants, insomnia, mood changes    OBJECTIVE:  CONSTITUTIONAL:  APPEARANCE: Alert, oriented x 3, well developed, well nourished. Consistent with stated age. Not in acute distress.  Sick appearing.  Has normal posture. Gait and station are normal.  Behavior appropriate for age.    HEENT: EYES: PERRLA, EOMI, no discharge.  No ciliary flushing, no conjunctival injection.  No     OROPHARYNX: Pink and moist, no abnormalities. Soft and hard palate intact without abnormality.    POSTERIOR PHARNYX: without redness or post nasal drip, no exudate, no irregularities    EARS:   R AUDITORY CANAL: Normal, without redness or cerumen impaction;    L AUDITORY CANAL: Normal, without redness or cerumen impaction;    R TYMPANIC MEMBRANE:  Normal; TM pearly gray with good cone of light and landmarks;   L TYMPANIC  "MEMBRANE:  Normal; TM pearly gray with good cone of light and landmarks;     R NARE: Normal, without purulent discharge,   L NARE:  Normal, without purulent discharge,     SINUS: Maxillary, frontal, ethmoid sinuses non-tender   CHEST/LUNG:  Inspection: symmetric with normal excursion and no use of accessory muscles. PALPATION: Chest reveals non-tender, tender chest. AUSCULTATION:  Respirations even, non labored. Breath sounds normal throughout lung fields.  Wheezes, rales, rhonchi. Normal tracheal sounds, normal bronchial sounds overlying sternum, normal bronchovessicular sounds between scapulae posteriorly, normal vesicular breath sounds heard throughout periphery    CARDIOVASCULAR: Normal heart sounds with regular rate and rhythm.  Normal heart sounds S1-S2.  Abnormal heart sounds- tachycardia, bradycardia, irregular, murmur.    PERIPHERAL VASCULAR: Bilateral upper extremities normal temperature with pink nail beds, no ulcerations, pulses normal.  Bilateral lower extremities normal temperature with pink nail beds, no ulcerations, pulses normal, no edema       NEURO PSYCHIATRIC: Appropriate mood, and affect. Articulates well, with normal speech/language.  No evidence of hallucinations, delusions, obsessions, no suicidal or homicidal ideations    LYMPHATIC:   Anterior Cervical Nodes-normal, size, abnormal size; non-tender, tender to palpation.  Posterior Cervical Nodes- normal size, abnormal size, non-tender, tender to palpation. Preauricular nodes: normal size; abnormal size, non-tender, tender to palpation.  Postauricular nodes:  normal size, abnormal size, non-tender, tender to palpation. Submandibular nodes: normal size, abnormal size, non-tender, tender to palpation.      /84 (BP Location: Right arm, Patient Position: Sitting, Cuff Size: Adult)   Pulse 87   Temp 98.5 °F (36.9 °C) (Oral)   Resp 22   Ht 182.9 cm (72\")   Wt (!) 144 kg (317 lb 12.8 oz)   SpO2 97%   BMI 43.10 kg/m²  "     Assessment/Plan  Danny was seen today for wheezing.    Diagnoses and all orders for this visit:    Fever, unspecified fever cause  -     POCT Influenza A/B    Viral illness       -   Push fluids      -   Rest more then usual.       -   Good handwashing.            POCT Influenza A/B   Order: [id number]   Status:  Final result   Visible to patient:  No (Not Released) Next appt:  04/15/2020 at 07:15 AM in Family Medicine (oRnit Mccabe DNP, APRN) Dx:  Fever, unspecified fever cause   Component  Ref Range & Units 08:36   Rapid Influenza A Ag  Negative Negative    Rapid Influenza B Ag  Negative Negative    Internal Control  Passed Passed    Lot Number [id number]    Expiration Date 06 06 2021    Resulting Agency [facility]         Specimen Collected: 11/27/19 08:36 Last Resulted: 11/27/19 08:36                                                 An After Visit Summary was printed and given to the patient at discharge.       Electronically signed by Ronit Mccabe DNP, APRN, 11/27/19, 8:37 AM.

## 2019-11-27 NOTE — PATIENT INSTRUCTIONS
Viral Illness, Adult  Viruses are tiny germs that can get into a person's body and cause illness. There are many different types of viruses, and they cause many types of illness. Viral illnesses can range from mild to severe. They can affect various parts of the body.  Common illnesses that are caused by a virus include colds and the flu. Viral illnesses also include serious conditions such as HIV/AIDS (human immunodeficiency virus/acquired immunodeficiency syndrome). A few viruses have been linked to certain cancers.  What are the causes?  Many types of viruses can cause illness. Viruses invade cells in your body, multiply, and cause the infected cells to malfunction or die. When the cell dies, it releases more of the virus. When this happens, you develop symptoms of the illness, and the virus continues to spread to other cells. If the virus takes over the function of the cell, it can cause the cell to divide and grow out of control, as is the case when a virus causes cancer.  Different viruses get into the body in different ways. You can get a virus by:  · Swallowing food or water that is contaminated with the virus.  · Breathing in droplets that have been coughed or sneezed into the air by an infected person.  · Touching a surface that has been contaminated with the virus and then touching your eyes, nose, or mouth.  · Being bitten by an insect or animal that carries the virus.  · Having sexual contact with a person who is infected with the virus.  · Being exposed to blood or fluids that contain the virus, either through an open cut or during a transfusion.  If a virus enters your body, your body's defense system (immune system) will try to fight the virus. You may be at higher risk for a viral illness if your immune system is weak.  What are the signs or symptoms?  Symptoms vary depending on the type of virus and the location of the cells that it invades. Common symptoms of the main types of viral illnesses  include:  Cold and flu viruses  · Fever.  · Headache.  · Sore throat.  · Muscle aches.  · Nasal congestion.  · Cough.  Digestive system (gastrointestinal) viruses  · Fever.  · Abdominal pain.  · Nausea.  · Diarrhea.  Liver viruses (hepatitis)  · Loss of appetite.  · Tiredness.  · Yellowing of the skin (jaundice).  Brain and spinal cord viruses  · Fever.  · Headache.  · Stiff neck.  · Nausea and vomiting.  · Confusion or sleepiness.  Skin viruses  · Warts.  · Itching.  · Rash.  Sexually transmitted viruses  · Discharge.  · Swelling.  · Redness.  · Rash.  How is this treated?  Viruses can be difficult to treat because they live within cells. Antibiotic medicines do not treat viruses because these drugs do not get inside cells. Treatment for a viral illness may include:  · Resting and drinking plenty of fluids.  · Medicines to relieve symptoms. These can include over-the-counter medicine for pain and fever, medicines for cough or congestion, and medicines to relieve diarrhea.  · Antiviral medicines. These drugs are available only for certain types of viruses. They may help reduce flu symptoms if taken early. There are also many antiviral medicines for hepatitis and HIV/AIDS.  Some viral illnesses can be prevented with vaccinations. A common example is the flu shot.  Follow these instructions at home:  Medicines    · Take over-the-counter and prescription medicines only as told by your health care provider.  · If you were prescribed an antiviral medicine, take it as told by your health care provider. Do not stop taking the medicine even if you start to feel better.  · Be aware of when antibiotics are needed and when they are not needed. Antibiotics do not treat viruses. If your health care provider thinks that you may have a bacterial infection as well as a viral infection, you may get an antibiotic.  ? Do not ask for an antibiotic prescription if you have been diagnosed with a viral illness. That will not make your  illness go away faster.  ? Frequently taking antibiotics when they are not needed can lead to antibiotic resistance. When this develops, the medicine no longer works against the bacteria that it normally fights.  General instructions  · Drink enough fluids to keep your urine clear or pale yellow.  · Rest as much as possible.  · Return to your normal activities as told by your health care provider. Ask your health care provider what activities are safe for you.  · Keep all follow-up visits as told by your health care provider. This is important.  How is this prevented?  Take these actions to reduce your risk of viral infection:  · Eat a healthy diet and get enough rest.  · Wash your hands often with soap and water. This is especially important when you are in public places. If soap and water are not available, use hand .  · Avoid close contact with friends and family who have a viral illness.  · If you travel to areas where viral gastrointestinal infection is common, avoid drinking water or eating raw food.  · Keep your immunizations up to date. Get a flu shot every year as told by your health care provider.  · Do not share toothbrushes, nail clippers, razors, or needles with other people.  · Always practice safe sex.    Contact a health care provider if:  · You have symptoms of a viral illness that do not go away.  · Your symptoms come back after going away.  · Your symptoms get worse.  Get help right away if:  · You have trouble breathing.  · You have a severe headache or a stiff neck.  · You have severe vomiting or abdominal pain.  This information is not intended to replace advice given to you by your health care provider. Make sure you discuss any questions you have with your health care provider.  Document Released: 04/28/2017 Document Revised: 05/31/2017 Document Reviewed: 04/28/2017  [employer] Interactive Patient Education © 2019 [employer]

## 2019-12-04 ENCOUNTER — OFFICE VISIT (OUTPATIENT)
Dept: FAMILY MEDICINE CLINIC | Facility: CLINIC | Age: 49
End: 2019-12-04

## 2019-12-04 VITALS
HEIGHT: 72 IN | DIASTOLIC BLOOD PRESSURE: 78 MMHG | RESPIRATION RATE: 18 BRPM | BODY MASS INDEX: 42.66 KG/M2 | TEMPERATURE: 98.4 F | SYSTOLIC BLOOD PRESSURE: 156 MMHG | HEART RATE: 106 BPM | WEIGHT: 315 LBS | OXYGEN SATURATION: 97 %

## 2019-12-04 DIAGNOSIS — K21.9 GASTROESOPHAGEAL REFLUX DISEASE WITHOUT ESOPHAGITIS: ICD-10-CM

## 2019-12-04 DIAGNOSIS — T78.40XA ALLERGIC REACTION, INITIAL ENCOUNTER: Primary | ICD-10-CM

## 2019-12-04 PROCEDURE — 96372 THER/PROPH/DIAG INJ SC/IM: CPT | Performed by: NURSE PRACTITIONER

## 2019-12-04 PROCEDURE — 99213 OFFICE O/P EST LOW 20 MIN: CPT | Performed by: NURSE PRACTITIONER

## 2019-12-04 RX ORDER — DEXAMETHASONE SODIUM PHOSPHATE 10 MG/ML
10 INJECTION INTRAMUSCULAR; INTRAVENOUS ONCE
Status: COMPLETED | OUTPATIENT
Start: 2019-12-04 | End: 2019-12-04

## 2019-12-04 RX ORDER — DIPHENHYDRAMINE HCL 25 MG
25 TABLET ORAL NIGHTLY PRN
Qty: 10 TABLET | Refills: 2 | Status: SHIPPED | OUTPATIENT
Start: 2019-12-04 | End: 2019-12-14

## 2019-12-04 RX ORDER — EPINEPHRINE 0.3 MG/.3ML
0.3 INJECTION SUBCUTANEOUS ONCE
Qty: 1 EACH | Refills: 5 | Status: SHIPPED | OUTPATIENT
Start: 2019-12-04 | End: 2019-12-04

## 2019-12-04 RX ORDER — HYDROXYZINE HYDROCHLORIDE 25 MG/1
25 TABLET, FILM COATED ORAL 3 TIMES DAILY PRN
Qty: 20 TABLET | Refills: 1 | Status: SHIPPED | OUTPATIENT
Start: 2019-12-04 | End: 2020-09-09

## 2019-12-04 RX ORDER — METHYLPREDNISOLONE 4 MG/1
TABLET ORAL
Qty: 21 EACH | Refills: 0 | Status: SHIPPED | OUTPATIENT
Start: 2019-12-04 | End: 2020-04-15

## 2019-12-04 RX ORDER — RABEPRAZOLE SODIUM 20 MG/1
20 TABLET, DELAYED RELEASE ORAL DAILY
Qty: 90 TABLET | Refills: 1 | Status: SHIPPED | OUTPATIENT
Start: 2019-12-04 | End: 2021-09-17

## 2019-12-04 RX ADMIN — DEXAMETHASONE SODIUM PHOSPHATE 10 MG: 10 INJECTION INTRAMUSCULAR; INTRAVENOUS at 13:58

## 2019-12-04 NOTE — PROGRESS NOTES
"Chief Complaint   Patient presents with   • Allergic Reaction     Thinks he is having a allergic reaction.   States he has not changed anything.         No Known Allergies    History provided by: self     HPI:  Subjective   Danny Vega is a 49 y.o. male presents today for  Acute allergic reaction.  States, \"I was fine all day and then a couple hours ago I broke out on my forehead, cheeks, head, back of neck, bilateral hands and feet.  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  Hands are so red and swollen he had difficulty getting his wedding ring off.  Denies any difficulty breathing or swallowing but all areas itch really bad and the back of the head/neck continues to get larger.  He did take a benadryl 25 a couple hours ago, and nothing is getting bigger.      PCP currently listed as Ronit Mccabe, DNP, APRN.     The following portions of the patient's history were reviewed and updated as appropriate: allergies, current medications, past family history, past medical history, past social history, past surgical history and problem list      Current Outpatient Medications:   •  aspirin 81 MG EC tablet, Take 81 mg by mouth Daily., Disp: , Rfl:   •  glucosamine sulfate 500 MG capsule capsule, Take  by mouth Daily., Disp: , Rfl:   •  lisinopril (PRINIVIL,ZESTRIL) 10 MG tablet, Take 1 tablet by mouth Daily., Disp: 90 tablet, Rfl: 1  •  Multiple Vitamins-Minerals (MULTIVITAMIN PO), Take  by mouth Daily., Disp: , Rfl:   •  RABEprazole (ACIPHEX) 20 MG EC tablet, Take 1 tablet by mouth Daily., Disp: 90 tablet, Rfl: 1  •  sertraline (ZOLOFT) 100 MG tablet, Take 1.5 tablets by mouth Every Night., Disp: 135 tablet, Rfl: 1  •  TiZANidine (ZANAFLEX) 4 MG capsule, Take 1 capsule by mouth 2 (Two) Times a Day., Disp: 60 capsule, Rfl: 0  •  diphenhydrAMINE (BENADRYL ALLERGY) 25 MG tablet, Take 1 tablet by mouth At Night As Needed for Itching for up to 10 days., Disp: 10 tablet, Rfl: 2  •  " EPINEPHrine (EPIPEN 2-SAM) 0.3 MG/0.3ML solution auto-injector injection, Inject 0.3 mL into the appropriate muscle as directed by prescriber 1 (One) Time for 1 dose., Disp: 1 each, Rfl: 5  •  hydrOXYzine (ATARAX) 25 MG tablet, Take 1 tablet by mouth 3 (Three) Times a Day As Needed for Itching., Disp: 20 tablet, Rfl: 1  •  methylPREDNISolone (MEDROL, SAM,) 4 MG tablet, Take as directed on package instructions., Disp: 21 each, Rfl: 0    Current Facility-Administered Medications:   •  dexamethasone (DECADRON) injection 10 mg, 10 mg, Intramuscular, Once, Ronit Mccabe, YURIY, APRN  Past Medical History:   Diagnosis Date   • GERD (gastroesophageal reflux disease)    • Hyperlipidemia    • Hypertension      Past Surgical History:   Procedure Laterality Date   • APPENDECTOMY       Social History     Socioeconomic History   • Marital status:      Spouse name: Not on file   • Number of children: Not on file   • Years of education: Not on file   • Highest education level: Not on file   Tobacco Use   • Smoking status: Never Smoker   • Smokeless tobacco: Never Used   Substance and Sexual Activity   • Alcohol use: No   • Drug use: No   • Sexual activity: Defer     Family History   Problem Relation Age of Onset   • Hypertension Mother    • Hypertension Father    • No Known Problems Sister    • No Known Problems Daughter    • Diabetes Maternal Grandmother    • Hypertension Maternal Grandmother    • Dementia Maternal Grandmother    • Cancer Maternal Grandfather    • Diabetes Paternal Grandmother    • Cancer Paternal Grandfather        REVIEW OF SYMPTOMS: (Positives bolded)  General:  weight loss, fever, chills, night sweats, fatigue, appetite loss  HEENT:  blurry vision, eye pain, eye discharge, dry eyes, decreased vision  Respiratory: shortness of breath, cough, hemoptysis, wheezing, pleurisy,   Cardiovascular:  chest pain, PND, palpitation, edema, orthopnea, syncope  Gastro: Nausea, vomiting, diarrhea, hematemesis,  "abdominal pain, constipation  Genito: hematuria, dysuria, glycosuria, hesitancy, frequency, incontinence  Musckelo: Arthralgia, myalgia, muscle weakness, joint swelling, NSAID use  Skin: rash, pruritis, HIVES, Whelps   Neuro:  Migraine, numbness, ataxia, tremor, vertigo, weakness, memory loss  \"All other systems reviewed and negative, except as listed above.”    OBJECTIVE:  Constitutional:  No acute distress, breathing well, however, is scratching his hands, face and neck.  Consistent with stated age. Oriented x 3,  Gait normal. Patient is pleasant and cooperative with the interview and exam.    Integumentary: Has hives on wrists, hands, cheeks, neck.  Bilateral hands/fingers have 2+ swelling and fingers are extremely red, his forehead, cheeks and nose are also extremely red, the back of his head/neck are extremely red, whelped up and it creates a visible hump across the back of neck to hair line, and there are hives/whelps all over his scalp.      Eye: Bilaterally PERRLA, EOMI.   Upper and lower eyelids are normal. Sclera/conjunctiva normal without discharge. Cornea is normal and clear. Lens is normal.  Eyeball appears normal.     ENMT:  Canals  normal without erythema or discharge, no excessive cerumen. Tympanic membranes Grey/pearly, normal light reflex and anatomy. Hearing normal to conversational speech at 2-5 feet. Nares airflow, no discharge. Dentition assessed and discussed appropriate oral care. Tongue normal midline.  no pharyngeal erythema, Uvula midline. No post nasal drip. No difficulties breathing.      CHEST/LUNG: Lungs clear throughout lung fields without rale, rhonchi or wheezes. no distress, no use of accessory muscles.       CARDIOVASCULAR:  Regular rate and rhythm. No murmur noted in sitting, supine positions.      ABDOMEN:  Bowel sounds normal, no abdominal bruits. Abd soft, non-tender, no rebound tenderness, no rigidity (guarding), no jar tenderness, no masses.  no hepatomegaly, no " "splenomegaly     Neuropsych: Normal speech, Thought- normal. No hallucinations, delusions, obsessions.   Appropriate judgement and memory.    /78 (BP Location: Left arm, Patient Position: Sitting, Cuff Size: Adult)   Pulse 106   Temp 98.4 °F (36.9 °C) (Oral)   Resp 18   Ht 182.9 cm (72.01\")   Wt (!) 146 kg (322 lb 3.2 oz)   SpO2 97%   BMI 43.69 kg/m²       We had a discussion about treatment, Could give him benadryl IM but he doesn't have anyone to drive him, so he prefers to treat it with oral medications. Says the itching is driving him crazy.  We discussed things to watch for.  If he develops wheezing, shortness of breath, or difficulty breathing:  call 911, having any problems at all related to breathing:  Call 911.  If symptoms get worse instead of improving; Call 911, in Doubt: call 911.  Pt verbalized understanding.  We also discussed epi pen.  I educated him that with it just causing his hands/fingers, head/neck swelling and there is no anaphylaxis, an epi pen does not need to be used, however, at the first sign of respiratory distress have pt use epi pen and call 911.  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  He does want one just in case this happens again and interferes with breathing. He said he will take another benadryl when he gets home.     ASSESSMENT  Danny was seen today for allergic reaction.    Diagnoses and all orders for this visit:    Allergic reaction, initial encounter  -     dexamethasone (DECADRON) injection 10 mg  -     hydrOXYzine (ATARAX) 25 MG tablet; Take 1 tablet by mouth 3 (Three) Times a Day As Needed for Itching.  -     methylPREDNISolone (MEDROL, SAM,) 4 MG tablet; Take as directed on package instructions. Instructed to start sam tomorrow because had shot today.   -     EPINEPHrine (EPIPEN 2-SAM) 0.3 MG/0.3ML solution auto-injector injection; Inject 0.3 mL into the appropriate muscle as directed by prescriber 1 (One) Time for 1 " dose.  -     diphenhydrAMINE (BENADRYL ALLERGY) 25 MG tablet; Take 1 tablet by mouth At Night As Needed for Itching for up to 10 days.    Gastroesophageal reflux disease without esophagitis  -     RABEprazole (ACIPHEX) 20 MG EC tablet; Take 1 tablet by mouth Daily.      Obesity Plan:  BMI  43.7 Weight 322 Plan:  Here for a sick visit, did not discuss weight        R/B/A of medications/treatment options d/w  the pt/family today. The patient/family are aware and accept that medications can have side effects.  If there any obvious side effects they should call or return to clinic as soon as possible.  Appropriate f/u discussed for topics addressed today. All questions were answered to the satisfactory state of patient/family.  Should symptoms fail to improve or worsen they agree to call or return to clinic or to go to the ER. Education handouts were offered on any new Rx if requested.  Discussed the importance of f/u with any needed screening tests/labs/specialist appointments and any requested follow-up recommended by me today.  Importance of maintaining f/u discussed and patient accepts that missed appointments can delay diagnosis and potentially lead to worsening of conditions.    Using medications as prescribed.  Discussed need to take regularly as prescribed, to avoid missing doses as able.  Medications reviewed with patient today. We discussed cessation/continuation of medications for current problem.  Needed Rx refills will be provided.  No side effects from medications.       Return in about 1 week (around 12/11/2019), or if symptoms worsen or fail to improve.    Electronically signed by Ronit Mccabe, YURIY, APRN, 12/04/19, 1:46 PM.

## 2019-12-23 DIAGNOSIS — M25.511 ACUTE PAIN OF RIGHT SHOULDER: ICD-10-CM

## 2019-12-23 RX ORDER — TIZANIDINE HYDROCHLORIDE 4 MG/1
4 CAPSULE, GELATIN COATED ORAL 2 TIMES DAILY
Qty: 60 CAPSULE | Refills: 0 | Status: SHIPPED | OUTPATIENT
Start: 2019-12-23 | End: 2020-01-03 | Stop reason: SDUPTHER

## 2020-01-02 DIAGNOSIS — M25.511 ACUTE PAIN OF RIGHT SHOULDER: ICD-10-CM

## 2020-01-03 RX ORDER — TIZANIDINE HYDROCHLORIDE 4 MG/1
4 CAPSULE, GELATIN COATED ORAL 2 TIMES DAILY
Qty: 60 CAPSULE | Refills: 0 | Status: SHIPPED | OUTPATIENT
Start: 2020-01-03 | End: 2020-01-17 | Stop reason: SDUPTHER

## 2020-01-16 ENCOUNTER — RESULTS ENCOUNTER (OUTPATIENT)
Dept: FAMILY MEDICINE CLINIC | Facility: CLINIC | Age: 50
End: 2020-01-16

## 2020-01-16 DIAGNOSIS — E78.2 MIXED HYPERLIPIDEMIA: ICD-10-CM

## 2020-01-17 DIAGNOSIS — M25.511 ACUTE PAIN OF RIGHT SHOULDER: ICD-10-CM

## 2020-01-17 RX ORDER — TIZANIDINE HYDROCHLORIDE 4 MG/1
4 CAPSULE, GELATIN COATED ORAL 2 TIMES DAILY
Qty: 60 CAPSULE | Refills: 2 | Status: SHIPPED | OUTPATIENT
Start: 2020-01-17 | End: 2020-04-15 | Stop reason: SDUPTHER

## 2020-01-21 NOTE — PROGRESS NOTES
Subjective    Mr. Vega is 49 y.o. male    Chief Complaint: Vasectomy Consult    History of Present Illness  49-year-old male requesting elective vasectomy for permanent sterilization.  He denies scrotal pain.  No LUTS.      The following portions of the patient's history were reviewed and updated as appropriate: allergies, current medications, past family history, past medical history, past social history, past surgical history and problem list.    Review of Systems   Constitutional: Negative for appetite change and fever.   HENT: Negative for hearing loss and sore throat.    Eyes: Negative for pain and redness.   Respiratory: Negative for cough and shortness of breath.    Cardiovascular: Negative for chest pain and leg swelling.   Gastrointestinal: Negative for anal bleeding, nausea and vomiting.   Endocrine: Negative for cold intolerance and heat intolerance.   Genitourinary: Negative for dysuria, flank pain, frequency, hematuria, penile pain, penile swelling, scrotal swelling, testicular pain and urgency.   Musculoskeletal: Negative for joint swelling and myalgias.   Skin: Negative for color change and rash.   Allergic/Immunologic: Negative for immunocompromised state.   Neurological: Negative for dizziness and speech difficulty.   Hematological: Negative for adenopathy. Does not bruise/bleed easily.   Psychiatric/Behavioral: Negative for dysphoric mood and suicidal ideas.         Current Outpatient Medications:   •  aspirin 81 MG EC tablet, Take 81 mg by mouth Daily., Disp: , Rfl:   •  glucosamine sulfate 500 MG capsule capsule, Take  by mouth Daily., Disp: , Rfl:   •  lisinopril (PRINIVIL,ZESTRIL) 10 MG tablet, Take 1 tablet by mouth Daily., Disp: 90 tablet, Rfl: 1  •  Multiple Vitamins-Minerals (MULTIVITAMIN PO), Take  by mouth Daily., Disp: , Rfl:   •  RABEprazole (ACIPHEX) 20 MG EC tablet, Take 1 tablet by mouth Daily., Disp: 90 tablet, Rfl: 1  •  sertraline (ZOLOFT) 100 MG tablet, Take 1.5 tablets by mouth  "Every Night., Disp: 135 tablet, Rfl: 1  •  TiZANidine (ZANAFLEX) 4 MG capsule, Take 1 capsule by mouth 2 (Two) Times a Day., Disp: 60 capsule, Rfl: 2  •  hydrOXYzine (ATARAX) 25 MG tablet, Take 1 tablet by mouth 3 (Three) Times a Day As Needed for Itching., Disp: 20 tablet, Rfl: 1  •  methylPREDNISolone (MEDROL, SAM,) 4 MG tablet, Take as directed on package instructions., Disp: 21 each, Rfl: 0    Past Medical History:   Diagnosis Date   • GERD (gastroesophageal reflux disease)    • Hyperlipidemia    • Hypertension        Past Surgical History:   Procedure Laterality Date   • APPENDECTOMY         Social History     Socioeconomic History   • Marital status:      Spouse name: Not on file   • Number of children: Not on file   • Years of education: Not on file   • Highest education level: Not on file   Tobacco Use   • Smoking status: Never Smoker   • Smokeless tobacco: Never Used   Substance and Sexual Activity   • Alcohol use: No   • Drug use: No   • Sexual activity: Defer       Family History   Problem Relation Age of Onset   • Hypertension Mother    • Hypertension Father    • No Known Problems Sister    • No Known Problems Daughter    • Diabetes Maternal Grandmother    • Hypertension Maternal Grandmother    • Dementia Maternal Grandmother    • Cancer Maternal Grandfather    • Diabetes Paternal Grandmother    • Cancer Paternal Grandfather        Objective    Temp 97.7 °F (36.5 °C)   Ht 182.9 cm (72\")   Wt 132 kg (290 lb)   BMI 39.33 kg/m²     Physical Exam  Constitutional: Well nourished, Well developed; No apparent distress.  His vital signs are reviewed  Psychiatric: Appropriate affect; Alert and oriented  Eyes: Unremarkable  Musculoskeletal: Normal gait and station  GI: Abdomen is soft, non-tender  Respiratory: No distress; Unlabored movement; No accessory musculature needed with symmetric movements  Skin: No pallor or diaphoresis  ; Penis and testicles are normal;       Results for orders placed or " performed in visit on 11/27/19   POCT Influenza A/B   Result Value Ref Range    Rapid Influenza A Ag Negative Negative    Rapid Influenza B Ag Negative Negative    Internal Control Passed Passed    Lot Number [id number]     Expiration Date 06 06 2021      Patient's Body mass index is 39.33 kg/m². BMI is above normal parameters. Recommendations include: educational material.    Assessment and Plan    Diagnoses and all orders for this visit:    Encounter for other contraceptive management  -     Vasectomy; Future  -     diazePAM (VALIUM) 10 MG tablet; 1 tab by mouth 30 minutes prior to vasectomy  -     oxyCODONE-acetaminophen (PERCOCET) 5-325 MG per tablet; Take 1 tablet by mouth Every 4 (Four) Hours As Needed (postop pain).      We spent 20 minutes discussing elective nature of vasectomy including the risks, benefits, and alternatives.  We discussed risk for infection, bleeding, need for additional procedures, loss of testicle, chronic pain, failure procedure, need to continue back of her current birth control method until sterility is confirmed.  He voiced understanding and provided informed consent to proceed in the clinic in the next few weeks.     More than half of this 20 minute visit was spent on counseling/coordinating care for  the patient about the following: vasectomy     The entire time allotted was spent as face to face time with the patient.

## 2020-01-27 ENCOUNTER — OFFICE VISIT (OUTPATIENT)
Dept: UROLOGY | Facility: CLINIC | Age: 50
End: 2020-01-27

## 2020-01-27 VITALS — WEIGHT: 290 LBS | HEIGHT: 72 IN | TEMPERATURE: 97.7 F | BODY MASS INDEX: 39.28 KG/M2

## 2020-01-27 DIAGNOSIS — Z30.8 ENCOUNTER FOR OTHER CONTRACEPTIVE MANAGEMENT: Primary | ICD-10-CM

## 2020-01-27 PROCEDURE — 99202 OFFICE O/P NEW SF 15 MIN: CPT | Performed by: UROLOGY

## 2020-01-27 RX ORDER — OXYCODONE HYDROCHLORIDE AND ACETAMINOPHEN 5; 325 MG/1; MG/1
1 TABLET ORAL EVERY 4 HOURS PRN
Qty: 12 TABLET | Refills: 0 | Status: SHIPPED | OUTPATIENT
Start: 2020-01-27 | End: 2020-06-19

## 2020-01-27 RX ORDER — DIAZEPAM 10 MG/1
TABLET ORAL
Qty: 1 TABLET | Refills: 0 | Status: SHIPPED | OUTPATIENT
Start: 2020-01-27 | End: 2020-06-19

## 2020-02-06 ENCOUNTER — OFFICE VISIT (OUTPATIENT)
Dept: UROLOGY | Facility: CLINIC | Age: 50
End: 2020-02-06

## 2020-02-06 DIAGNOSIS — Z30.2 STERILIZATION: Primary | ICD-10-CM

## 2020-02-06 PROCEDURE — 55250 REMOVAL OF SPERM DUCT(S): CPT | Performed by: UROLOGY

## 2020-02-06 NOTE — PROGRESS NOTES
CC: I am here to have a vasectomy    Vasectomy procedure note  Patient has been premedicated with Valium.  He has done the appropriate shave the day before the procedure.  He is placed in the supine position.  The usual prep and drape with Betadine is carried out.  The vas is palpated on the right side and  from the remainder of the cord bluntly and pulled just underneath the skin.  1% lidocaine is infiltrated in the subcutaneous tissue.  An incision is made directly onto the vas.  The perivasal tissue was bluntly stripped away from the vas deferens freeing an approximate 2 cm segment.  Titanium clips were placed both proximally and distally and the intervening segment excised.  The specimen,is discarded.  The ends are fulgurated with electrocautery as well as any vessels.    The left-sided vasectomy was carried out the same as the right with no change in findings or technique.  Both wounds are irrigated with sterile saline.  Any subcutaneous vessels that are bleeding are fulgurated.  The skin is closed with a running 3-0 chromic suture.    The patient tolerated this procedure well.  Postoperative instructions were given.  He is reminded that we will need to see  a sample after approximately 20-25 ejaculations to determine whether or not he has had a successful vasectomy.  He is encouraged to use birth control up until that time.    Akhil Pantoja MD  2/6/2020  1:23 PM

## 2020-04-02 ENCOUNTER — TELEPHONE (OUTPATIENT)
Dept: UROLOGY | Facility: CLINIC | Age: 50
End: 2020-04-02

## 2020-04-02 ENCOUNTER — LAB (OUTPATIENT)
Dept: LAB | Facility: HOSPITAL | Age: 50
End: 2020-04-02

## 2020-04-02 DIAGNOSIS — Z30.2 STERILIZATION: ICD-10-CM

## 2020-04-02 LAB — SPERM - POST VASECTOMY: NORMAL

## 2020-04-02 PROCEDURE — 89321 SEMEN ANAL SPERM DETECTION: CPT | Performed by: UROLOGY

## 2020-04-02 NOTE — TELEPHONE ENCOUNTER
Patient called wanting to know results of semen analysis. Confirmed to patient that results of semen analysis showed patient to be sterile.

## 2020-04-15 ENCOUNTER — TELEMEDICINE (OUTPATIENT)
Dept: FAMILY MEDICINE CLINIC | Facility: CLINIC | Age: 50
End: 2020-04-15

## 2020-04-15 VITALS
BODY MASS INDEX: 39.42 KG/M2 | DIASTOLIC BLOOD PRESSURE: 81 MMHG | TEMPERATURE: 97 F | HEIGHT: 72 IN | SYSTOLIC BLOOD PRESSURE: 131 MMHG | RESPIRATION RATE: 12 BRPM | WEIGHT: 291.01 LBS | HEART RATE: 78 BPM

## 2020-04-15 DIAGNOSIS — M25.511 ACUTE PAIN OF RIGHT SHOULDER: ICD-10-CM

## 2020-04-15 DIAGNOSIS — I10 ESSENTIAL HYPERTENSION: ICD-10-CM

## 2020-04-15 PROCEDURE — 99213 OFFICE O/P EST LOW 20 MIN: CPT | Performed by: NURSE PRACTITIONER

## 2020-04-15 RX ORDER — LISINOPRIL 10 MG/1
10 TABLET ORAL DAILY
Qty: 90 TABLET | Refills: 1 | Status: SHIPPED | OUTPATIENT
Start: 2020-04-15 | End: 2020-09-09

## 2020-04-15 RX ORDER — TIZANIDINE HYDROCHLORIDE 4 MG/1
4 CAPSULE, GELATIN COATED ORAL 2 TIMES DAILY
Qty: 270 CAPSULE | Refills: 0 | Status: SHIPPED | OUTPATIENT
Start: 2020-04-15 | End: 2020-07-29

## 2020-04-15 NOTE — PROGRESS NOTES
Chief Complaint   Patient presents with   • Hypertension          HPI:  Danny Vega, 1970, for: depression, HTN, and muscle spasms. This was a scheduled 6 month visit for chronic problems.  He says that work has been checking them daily for temperature and symptoms.  He has been symptom free.       HTN: Chronic, stable problem.  Started more than a year ago.  Is well controlled on lisinopril.  Says he has been getting it checked at work and it has been at goal ranging from the 120-130's/70-85.  He denies chest pain, shortness of breath or headaches.  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     Depression:  Chronic, stable problem.  Started this last year.  Was on sertraline and hydroxyzine but says the sertraline made him feel drugged out so he quit taking it about a month ago.  Does take the hydroxyzine as needed and hasn't had any for the last month.  Says that the depression is doing very well, denies any suicidal or homicidal ideations. He does not want to take any other anti-depressant because he says he is controlling it with mediatation and calming exercises    Muscle Spasms/R Shoulder pain : Chronic, stable problem.  Started months ago.  Says he has to take the tizanidine almost twice daily and says that he has needed it more on the weekends when he has been working around the house, or doing other physical things that effect his shoulder.      PCP: Ronit Mccabe, DNP, APRN    You have chosen to receive care through a video visit today. Do you consent to use a video visit for your medical care today? Yes    I have reviewed the e-Visit questionnaire and patient's answers, my assessment and plan are as follows:    BOLD indicates positive   General:  weight loss, fever, chills, appetite loss  SKIN: change in wart/mole, itching, rash, new lesions, nail changes  HEENT:   ear pain, sore throat, sinus pressure, blurry vision, eye pain, dry eyes,  "tinnitus  Respiratory: cough, difficulty breathing, wheezing, hemoptysis   Cardiovascular:  chest pain, shortness of breath, swelling of extremities, syncope  Gastro: abdominal pain, constipation, nausea, vomiting, diarrhea, hematemesis  Genito: hematuria, dysuria, glycosuria, hesitancy, frequency, incontinence  Musckelo: joint pain, muscle spasms, arthralgia’s, muscle weakness, joint swelling, NSAID use  \"All other systems reviewed and negative, except as listed above.”    Past Medical History:   Diagnosis Date   • Anxiety 1978   • Depression 2018   • GERD (gastroesophageal reflux disease)    • Hyperlipidemia    • Hypertension    • Obesity 1970       Family History   Problem Relation Age of Onset   • Hypertension Mother    • Hypertension Father    • Cancer Father         Lung cancer   • COPD Father    • Hyperlipidemia Father    • No Known Problems Sister    • No Known Problems Daughter    • Diabetes Maternal Grandmother    • Hypertension Maternal Grandmother    • Dementia Maternal Grandmother    • Hyperlipidemia Maternal Grandmother    • Stroke Maternal Grandmother    • Cancer Maternal Grandfather         Liver cancer   • Alcohol abuse Maternal Grandfather    • Diabetes Paternal Grandmother    • Hyperlipidemia Paternal Grandmother    • Hypertension Paternal Grandmother    • Stroke Paternal Grandmother    • Cancer Paternal Grandfather         Black lung   • Anxiety disorder Paternal Aunt        Social History     Socioeconomic History   • Marital status:      Spouse name: Not on file   • Number of children: Not on file   • Years of education: Not on file   • Highest education level: Not on file   Tobacco Use   • Smoking status: Former Smoker     Packs/day: 1.00     Years: 2.00     Pack years: 2.00     Types: Cigarettes, Cigars   • Smokeless tobacco: Never Used   • Tobacco comment: Longtime snuff user   Substance and Sexual Activity   • Alcohol use: No   • Drug use: No   • Sexual activity: Yes     Partners: " Female     Birth control/protection: Surgical     Comment: Vasectomy       OBJECTIVE:  Constitutional:  No acute distress, Consistent with stated age. Oriented x 3,  Gait normal. Patient is pleasant and cooperative with the interview and exam.    Integumentary: No rashes, ulcers or lesions. No edema.  Normal skin moisture/turgor.     Eye: Bilaterally PERRLA.   Upper and lower eyelids are normal. Sclera/conjunctiva normal without discharge. Cornea is normal and clear. Lens is normal.  Eyeball appears normal.     ENMT:  Canals  normal without erythema or discharge. Hearing normal to conversational speech at 2-5 feet. Nares airflow, no discharge. Dentition assessed and discussed appropriate oral care. Tongue normal midline.       CHEST/LUNG: Says Lungs are clear, no use of accessory muscles, no cough.       CARDIOVASCULAR:  Regular      Neuropsych: Normal speech, Thought- normal. No hallucinations, delusions, obsessions.   Appropriate judgement and memory.    Musco: Has normal range of motion of hips, knees, elbows, wrist, hand and fingers.  I asked pt to perform the following tasks:  He has difficulty crossing the right shoulder over to the left, has difficulty raising right shoulder above shoulder height, can pronate and supinate.       ASSESSMENT  Danny was seen today for hypertension.    Diagnoses and all orders for this visit:    HTN: Essential hypertension  -     Continue lisinopril (PRINIVIL,ZESTRIL) 10 MG tablet; Take 1 tablet by mouth Daily.        -     Continue monitoring bp and if any elevations follow up        -      CBC, CMP, Lipid:  Pt has employee physical next week and will bring results to office    Acute pain of right shoulder  -     TiZANidine (ZANAFLEX) 4 MG capsule; Take 1 capsule by mouth 2 (Two) Times a Day.     This visit has been rescheduled as a video visit to comply with patient safety concerns in accordance with CDC recommendations. Total time of discussion was 13 minutes.    This was an  audio and video enabled telemedicine encounter.    Any medications prescribed have been sent electronically to   [facility]  for [facility] - [place], [place] - [address] - [phone]  - [phone] FX  [address] [address]  Phone: [phone] Fax: [phone]      bozena in, use good hand washing and practice social distancing during these hard times.       Return in about 3 months (around 7/15/2020).    Electronically signed by Ronit Mccabe, YURIY, APRN, 04/15/20, 7:43 AM.    04/15/20

## 2020-04-28 DIAGNOSIS — F41.8 DEPRESSION WITH ANXIETY: ICD-10-CM

## 2020-04-28 RX ORDER — SERTRALINE HYDROCHLORIDE 100 MG/1
TABLET, FILM COATED ORAL
Qty: 135 TABLET | Refills: 3 | OUTPATIENT
Start: 2020-04-28

## 2020-06-19 ENCOUNTER — OFFICE VISIT (OUTPATIENT)
Dept: FAMILY MEDICINE CLINIC | Facility: CLINIC | Age: 50
End: 2020-06-19

## 2020-06-19 VITALS
RESPIRATION RATE: 18 BRPM | DIASTOLIC BLOOD PRESSURE: 82 MMHG | WEIGHT: 315 LBS | BODY MASS INDEX: 42.66 KG/M2 | OXYGEN SATURATION: 98 % | TEMPERATURE: 97.3 F | HEART RATE: 81 BPM | SYSTOLIC BLOOD PRESSURE: 144 MMHG | HEIGHT: 72 IN

## 2020-06-19 DIAGNOSIS — H60.331 ACUTE SWIMMER'S EAR OF RIGHT SIDE: ICD-10-CM

## 2020-06-19 DIAGNOSIS — H66.001 ACUTE SUPPURATIVE OTITIS MEDIA OF RIGHT EAR WITHOUT SPONTANEOUS RUPTURE OF TYMPANIC MEMBRANE, RECURRENCE NOT SPECIFIED: Primary | ICD-10-CM

## 2020-06-19 PROCEDURE — 99213 OFFICE O/P EST LOW 20 MIN: CPT | Performed by: NURSE PRACTITIONER

## 2020-06-19 RX ORDER — CIPROFLOXACIN AND DEXAMETHASONE 3; 1 MG/ML; MG/ML
4 SUSPENSION/ DROPS AURICULAR (OTIC) 2 TIMES DAILY
Qty: 7.5 ML | Refills: 0 | Status: SHIPPED | OUTPATIENT
Start: 2020-06-19 | End: 2021-03-09

## 2020-06-19 RX ORDER — CEFDINIR 300 MG/1
300 CAPSULE ORAL 2 TIMES DAILY
Qty: 14 CAPSULE | Refills: 0 | Status: SHIPPED | OUTPATIENT
Start: 2020-06-19 | End: 2020-06-19 | Stop reason: SDUPTHER

## 2020-06-19 RX ORDER — CEFDINIR 300 MG/1
300 CAPSULE ORAL 2 TIMES DAILY
Qty: 14 CAPSULE | Refills: 0 | Status: SHIPPED | OUTPATIENT
Start: 2020-06-19 | End: 2021-03-09

## 2020-06-19 RX ORDER — CIPROFLOXACIN AND DEXAMETHASONE 3; 1 MG/ML; MG/ML
4 SUSPENSION/ DROPS AURICULAR (OTIC) 2 TIMES DAILY
Qty: 7.5 ML | Refills: 0 | Status: SHIPPED | OUTPATIENT
Start: 2020-06-19 | End: 2020-06-19 | Stop reason: SDUPTHER

## 2020-07-28 DIAGNOSIS — M25.511 ACUTE PAIN OF RIGHT SHOULDER: ICD-10-CM

## 2020-07-29 RX ORDER — TIZANIDINE HYDROCHLORIDE 4 MG/1
CAPSULE, GELATIN COATED ORAL
Qty: 270 CAPSULE | Refills: 3 | Status: SHIPPED | OUTPATIENT
Start: 2020-07-29 | End: 2021-03-09 | Stop reason: SDUPTHER

## 2020-08-25 ENCOUNTER — OFFICE VISIT (OUTPATIENT)
Dept: GASTROENTEROLOGY | Age: 50
End: 2020-08-25
Payer: COMMERCIAL

## 2020-08-25 VITALS
BODY MASS INDEX: 42.66 KG/M2 | SYSTOLIC BLOOD PRESSURE: 120 MMHG | WEIGHT: 315 LBS | HEART RATE: 79 BPM | HEIGHT: 72 IN | DIASTOLIC BLOOD PRESSURE: 80 MMHG | OXYGEN SATURATION: 99 %

## 2020-08-25 PROCEDURE — 99214 OFFICE O/P EST MOD 30 MIN: CPT | Performed by: NURSE PRACTITIONER

## 2020-08-25 RX ORDER — TIZANIDINE HYDROCHLORIDE 4 MG/1
4 CAPSULE, GELATIN COATED ORAL 2 TIMES DAILY
COMMUNITY
Start: 2020-07-29

## 2020-08-25 RX ORDER — RABEPRAZOLE SODIUM 20 MG/1
20 TABLET, DELAYED RELEASE ORAL DAILY
Qty: 90 TABLET | Refills: 3 | Status: SHIPPED | OUTPATIENT
Start: 2020-08-25 | End: 2021-05-03 | Stop reason: ALTCHOICE

## 2020-08-25 ASSESSMENT — ENCOUNTER SYMPTOMS
DIARRHEA: 0
BLOOD IN STOOL: 0
CONSTIPATION: 0
NAUSEA: 0
VOICE CHANGE: 0
CHEST TIGHTNESS: 0
BACK PAIN: 0
ABDOMINAL DISTENTION: 0
VOMITING: 0
ABDOMINAL PAIN: 0
SHORTNESS OF BREATH: 0
RECTAL PAIN: 0
COUGH: 0
SORE THROAT: 0

## 2020-08-25 NOTE — PATIENT INSTRUCTIONS
Schedule colonoscopy and endoscopy. Do not eat or drink after midnight the day of the procedure. Allowed medications can be taken with a small sip of water. Please review your prep instructions for allowed medications. You will not be able to drive for 24 hours after the procedure due to sedation. Bring a  with you the day of the procedure. If you are on blood thinners, clearance from the prescribing physician will be obtained before your procedure is scheduled. If it is determined it is not safe to hold these medications for a short time an alternative procedure for evaluation may be recommended. No aspirin, ibuprofen, naproxen, fish oil or vitamin E for 5 days before procedure. Risks of colonoscopy and endoscopy include, but are not limited to, perforation, bleeding, and infection, Risk of perforation and bleeding are increased if there is a polyp removed or dilation completed. Anesthesia risks will be reviewed with you before the procedure by a member of the anesthesia department. Your physician may also schedule a follow up appointment with the nurse practitioner to discuss pathology, symptoms or to check if you have had any problems related to your procedure. If you prefer not to return to the office after your procedure please discuss this with your physician on the day of your colonoscopy. The physician will talk with you and/or your family after the procedure is completed. Final recommendations are based on the pathologist report if biopsies or specimens are taken. For Colonoscopy: You will be given specific directions regarding restrictions to diet and bowel prep instructions including laxatives. Please read these instructions one week prior to your scheduled procedure to ensure that you are prepared.  If you have any questions regarding these instructions please call our office Mon through Fri from 8:00 am to 4:00 pm.     Follow prep instructions provided for bowel prep. Take all of the bowel prep as directed. If you are having problems with nausea, stop your prep for 30-45 min to allow the nausea to subside before resuming your prep. It is important to drink plenty of fluids throughout the day before taking your laxatives. This will help to protect your kidneys, prevent dehydration and maximize the effect of the bowel prep. If polyps are removed during the procedure they will be sent to a pathologist for analysis. Unless you have a follow up appointment scheduled, you will be notified by mail of the pathology results within 4 weeks. If you have not received results after 4 weeks you may call the office to obtain this information. Your diet before a colonoscopy bowel preparation is very important to ensure a successful colon exam. It is recommended to consider certain changes to your diet three to four days prior to the procedure. Remember that your bowels need to be empty for the exam.    What foods are good to eat? Cut down on heavy solid foods three to four days before the procedure and start introducing lighter meals to your diet. The following food suggestions are a good part of your diet before a colonoscopy bowel preparation. Light meat that is easily digestible such as chicken (without the skin)   Potatoes without skin   Cheese   Eggs   A light meal of steamed white fish   Light clear soups    Foods and drinks to avoid  Avoid foods that contain too much fiber. Stay clear of dark colored beverages. They can stick to the walls of the digestive tract and make it difficult to differentiate from blood. Some of these foods are:  Red meat, rice, nuts and vegetables   Milk, other milk based fluids and cream   Most fruit and puddings   Whole grain pasta   Cereals, bran and seeds   Colored beverages, especially those that are red or purple in color   Red colored Jell-O   On the day before the colonoscopy, continue to drink plenty of clear fluids.  It is important to keep yourself hydrated before the exam.     Please follow all instructions as provided for cleansing the bowel. Failure to have an adequately prepped colon may cause you to have incomplete exam with further testing required.      [email or web address]

## 2020-08-25 NOTE — PROGRESS NOTES
Subjective:      Patient ID: Isaiah Cunningham is a 48 y.o. male  Magdalena Barrera, APRN - CNP  No ref. provider found    HPI  Chief Complaint   Patient presents with    Gastroesophageal Reflux     Garner's       Patient with history of chronic GERD and long segment Garner's esophagus. Due for surveillance EGD. He was diagnosed with Garner's in 2010. Last egd 3/2019 with biopsies pos for Garnre's Previous biopsies positive 22 cm to 33 cm. Neg for dysplasia.      He takes Aciphex daily and otc omeprazole prn breakthrough symptoms. He denies dysphagia, melena, n/v, abdominal pain. He has had some occurrences of waking at night with reflux. He admits he eats late sometimes because of his work it is unavoidable.        He was referred to [address] [place], [place] [address] previously to discuss possible ablation but not recommended at this time. He prefers frequent monitoring with surveillance biopsies. He would like to schedule his initial screening colonoscopy same time. Just turned 50. Patient denies rectal bleeding, change in bowel habit or abdominal pain. Father had history of colon polyps. Assessment:      1. Long-segment Garner's esophagus    2. Chronic GERD    3. Family history of colonic polyps            Plan:      Schedule colonoscopy and endoscopy   Biopsies for Garner's surveillance    Instruct on bowel prep. Nothing to eat or drink after midnight the day of the exam.  Unable to drive for 24 hours after the procedure. No aspirin or nonsteroidal anti-inflammatories for 5 days before procedure. I have discussed the benefits, alternatives, and risks (including bleeding, perforation and death)  for pursuing Endoscopy (EGD/Colonscopy/EUS/ERCP) with the patient and they are willing to continue. We also discussed the need for anesthesia, IV access, proper dietary changes, medication changes if necessary, and need for bowel prep (if ordered) prior to their Endoscopic procedure.   They are aware they must have someone accompany them to their scheduled procedure to drive them home - they agree to the above and are willing to continue. Plan for anesthesia: MAC      Continue daily PPI  Use otc pepcid 1-2 x daily as needed for breakthrough    Medication refilled  Orders Placed This Encounter   Medications    RABEprazole (ACIPHEX) 20 MG tablet     Sig: Take 1 tablet by mouth daily     Dispense:  90 tablet     Refill:  3     Pt advised to call if any problems r/t medication.    Discussed medication including administration and side effects               Family History   Problem Relation Age of Onset    High Blood Pressure Mother     High Blood Pressure Father     High Cholesterol Father     Colon Polyps Father     Liver Cancer Maternal Grandfather     Cirrhosis Maternal Grandfather     Liver Disease Maternal Grandfather     Lung Cancer Paternal Grandfather     Colon Cancer Neg Hx     Esophageal Cancer Neg Hx     Rectal Cancer Neg Hx     Stomach Cancer Neg Hx        Past Medical History:   Diagnosis Date    Garner's esophagus     GERD (gastroesophageal reflux disease)     Weight loss        Past Surgical History:   Procedure Laterality Date    APPENDECTOMY      UPPER GASTROINTESTINAL ENDOSCOPY  8/1/2011        UPPER GASTROINTESTINAL ENDOSCOPY  8/1/2012        UPPER GASTROINTESTINAL ENDOSCOPY  10/7/10    Dr Kalani Ashford ENDOSCOPY  9-3-10    Dr Kalani Ashford ENDOSCOPY  11/24/2014    Dr Ryne Fitzgerald: long segment Garner's surveillance, biopsies neg for dysplasia    UPPER GASTROINTESTINAL ENDOSCOPY  11/2015    long segment Garner's surveillance, biopsies neg for dysplasia    UPPER GASTROINTESTINAL ENDOSCOPY  12/21/2016    Dr Ryne Fitzgerald [facility])-hiatal hernia, (+) Long seg Garner's (from 23 cm to 30 cm, not 34-36 cm), (-) dysplasia--1 yr recall and referral to [facility] for ablation therapy    UPPER GASTROINTESTINAL ENDOSCOPY  12/20/2017 Dr Breana Reyes [facility]-Long seg madden's (+) dysplasia (-), 1 yr recall, ablation referral also    UPPER GASTROINTESTINAL ENDOSCOPY  03/06/2019    Dr Cassi Sigala Co-Distal esophagitis, gastritis (+) Madden's (-) dysplasia, 3 yr recall       Current Outpatient Medications   Medication Sig Dispense Refill    tiZANidine (ZANAFLEX) 4 MG capsule Take 4 mg by mouth 2 times daily       RABEprazole (ACIPHEX) 20 MG tablet Take 1 tablet by mouth daily 90 tablet 3    Ascorbic Acid (VITAMIN C) 500 MG CHEW Take 500 mg by mouth daily      Glucosamine 500 MG CAPS Take by mouth      lisinopril (PRINIVIL;ZESTRIL) 10 MG tablet       aspirin 81 MG EC tablet Take 81 mg by mouth daily.  therapeutic multivitamin-minerals (THERAGRAN-M) tablet Take 1 tablet by mouth daily.  Psyllium (METAMUCIL PO) Take  by mouth 2 times daily. No current facility-administered medications for this visit. No Known Allergies     reports that he has never smoked. He has never used smokeless tobacco. He reports current alcohol use. He reports that he does not use drugs. Review of Systems   Constitutional: Negative for appetite change, fever and unexpected weight change. HENT: Negative for sore throat and voice change. Respiratory: Negative for cough, chest tightness and shortness of breath. Cardiovascular: Negative for chest pain, palpitations and leg swelling. Gastrointestinal: Negative for abdominal distention, abdominal pain, blood in stool, constipation, diarrhea, nausea, rectal pain and vomiting. Reflux   Musculoskeletal: Negative for arthralgias, back pain and gait problem. Skin: Negative for pallor, rash and wound. Neurological: Negative for dizziness, weakness and light-headedness. Hematological: Negative for adenopathy. Does not bruise/bleed easily. All other systems reviewed and are negative.       Objective:   Physical Exam  Constitutional:       General: He is not in acute distress. Appearance: Normal appearance. He is well-developed. Comments: /80   Pulse 79   Ht 6' (1.829 m)   Wt (!) 330 lb (149.7 kg)   SpO2 99%   BMI 44.76 kg/m²      Eyes:      General: No scleral icterus. Conjunctiva/sclera: Conjunctivae normal.   Neck:      Trachea: No tracheal deviation. Cardiovascular:      Rate and Rhythm: Normal rate and regular rhythm. Heart sounds: No murmur. No friction rub. No gallop. Pulmonary:      Effort: Pulmonary effort is normal. No respiratory distress. Breath sounds: Normal breath sounds. No wheezing, rhonchi or rales. Abdominal:      General: Bowel sounds are normal. There is no distension. Palpations: Abdomen is soft. There is no hepatomegaly or mass. Tenderness: There is no abdominal tenderness. There is no guarding or rebound. Skin:     General: Skin is warm and dry. Coloration: Skin is not pale. Neurological:      Mental Status: He is alert and oriented to person, place, and time. Psychiatric:         Behavior: Behavior normal.         Thought Content:  Thought content normal.

## 2020-09-09 ENCOUNTER — OFFICE VISIT (OUTPATIENT)
Dept: FAMILY MEDICINE CLINIC | Facility: CLINIC | Age: 50
End: 2020-09-09

## 2020-09-09 VITALS
SYSTOLIC BLOOD PRESSURE: 180 MMHG | TEMPERATURE: 98.7 F | BODY MASS INDEX: 42.66 KG/M2 | DIASTOLIC BLOOD PRESSURE: 94 MMHG | WEIGHT: 315 LBS | HEART RATE: 101 BPM | OXYGEN SATURATION: 99 % | HEIGHT: 72 IN | RESPIRATION RATE: 18 BRPM

## 2020-09-09 DIAGNOSIS — R35.0 FREQUENCY OF URINATION: Primary | ICD-10-CM

## 2020-09-09 DIAGNOSIS — I10 ESSENTIAL HYPERTENSION: ICD-10-CM

## 2020-09-09 DIAGNOSIS — E78.2 MIXED HYPERLIPIDEMIA: ICD-10-CM

## 2020-09-09 DIAGNOSIS — N52.9 ERECTILE DYSFUNCTION, UNSPECIFIED ERECTILE DYSFUNCTION TYPE: ICD-10-CM

## 2020-09-09 DIAGNOSIS — R73.01 IMPAIRED FASTING GLUCOSE: ICD-10-CM

## 2020-09-09 LAB
BILIRUB BLD-MCNC: NEGATIVE MG/DL
CLARITY, POC: CLEAR
COLOR UR: YELLOW
GLUCOSE UR STRIP-MCNC: NEGATIVE MG/DL
HBA1C MFR BLD: 5.6 %
KETONES UR QL: NEGATIVE
LEUKOCYTE EST, POC: NEGATIVE
NITRITE UR-MCNC: NEGATIVE MG/ML
PH UR: 6 [PH] (ref 5–8)
PROT UR STRIP-MCNC: NEGATIVE MG/DL
RBC # UR STRIP: NEGATIVE /UL
SP GR UR: 1.02 (ref 1–1.03)
UROBILINOGEN UR QL: NORMAL

## 2020-09-09 PROCEDURE — 81003 URINALYSIS AUTO W/O SCOPE: CPT | Performed by: NURSE PRACTITIONER

## 2020-09-09 PROCEDURE — 99214 OFFICE O/P EST MOD 30 MIN: CPT | Performed by: NURSE PRACTITIONER

## 2020-09-09 PROCEDURE — 83036 HEMOGLOBIN GLYCOSYLATED A1C: CPT | Performed by: NURSE PRACTITIONER

## 2020-09-09 RX ORDER — SILDENAFIL 100 MG/1
100 TABLET, FILM COATED ORAL DAILY PRN
Qty: 5 TABLET | Refills: 1 | Status: SHIPPED | OUTPATIENT
Start: 2020-09-09 | End: 2020-10-01 | Stop reason: SDUPTHER

## 2020-09-09 RX ORDER — ATORVASTATIN CALCIUM 20 MG/1
20 TABLET, FILM COATED ORAL DAILY
Qty: 90 TABLET | Refills: 0 | Status: SHIPPED | OUTPATIENT
Start: 2020-09-09 | End: 2020-10-01 | Stop reason: SDUPTHER

## 2020-09-09 RX ORDER — LISINOPRIL 20 MG/1
20 TABLET ORAL DAILY
Qty: 90 TABLET | Refills: 1 | Status: SHIPPED | OUTPATIENT
Start: 2020-09-09 | End: 2020-10-01 | Stop reason: SDUPTHER

## 2020-09-09 NOTE — PROGRESS NOTES
CC: HTN, hyperlipidemia and anxiety.       Silvia Delgado is a 50 yr old male here for follow up for HTN, hyperlipidemia, anxiety.  He says that the nurse took his bp by automatic cuff and it was 180/94.  He says that his bp has never been that high and he wants it retaken.  I did retake his bp manual however, it was still elevated at 150/90.  I recommended increasing the lisinopril to 40 mg daily or adding HCTZ.  He does not want to increase dosage and does not want water pill.  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 He says he will have the nurse at the plant monitor his bp and if elevated he will follow up. He brings in his labs from work, so he does not have to have labs again.  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  He says he is anxious because he is scheduled for a colonoscopy and endoscopy Sept 23, 2020 and is concerned about the prep.  Says wife is worried about urine because he had some frequency.     CMP: Glucose was elevated 123, other wise lab was normal  LIPID: cholesterol 219, triglycerides 245  EKG: NSR with septal infarct, old, Reviewed by Dr. Mendiola who ordered nothing to worry about, pt had stress test in the past normal.   PSA: normal   TSH: normal  T3: normal   CBC: normal     Hypertension   This is a chronic problem. The current episode started more than 1 year ago. Associated symptoms include anxiety. Pertinent negatives include no chest pain, palpitations or shortness of breath. There are no associated agents to hypertension. Risk factors for coronary artery disease include dyslipidemia, male gender, obesity and sedentary lifestyle. Past treatments include ACE inhibitors. Current  antihypertension treatment includes ACE inhibitors. The current treatment provides mild improvement. There are no compliance problems.  There is no history of kidney disease, CVA, heart failure, left ventricular hypertrophy, PVD or retinopathy. There is no history of coarctation of the aorta, hyperaldosteronism, hypercortisolism, pheochromocytoma, renovascular disease or sleep apnea.   Hyperlipidemia   This is a new problem. The current episode started more than 1 month ago. The problem is uncontrolled. Recent lipid tests were reviewed and are normal. There are no known factors aggravating his hyperlipidemia. Pertinent negatives include no chest pain or shortness of breath. He is currently on no antihyperlipidemic treatment. The current treatment provides no improvement of lipids. There are no compliance problems.  Risk factors for coronary artery disease include dyslipidemia, family history, hypertension, male sex and obesity.   Anxiety   Presents for follow-up visit. Symptoms include decreased concentration and irritability. Patient reports no chest pain, confusion, depressed mood, insomnia, palpitations, panic, restlessness, shortness of breath or suicidal ideas. Symptoms occur occasionally. The severity of symptoms is moderate. The quality of sleep is good. Nighttime awakenings: none.     Compliance with medications is %.    BP RECHECK 160/80    The following portions of the patient's history were reviewed and updated as appropriate: allergies, current medications, past family history, past medical history, past social history, past surgical history and problem list.    Review of Systems   Constitutional: Positive for irritability. Negative for activity change and appetite change.   HENT: Negative.    Respiratory: Negative.  Negative for apnea, cough, choking, chest tightness and shortness of breath.    Cardiovascular: Negative.  Negative for chest pain, palpitations and leg swelling.   Genitourinary:  Negative.    Allergic/Immunologic: Negative.    Neurological: Negative for confusion.   Psychiatric/Behavioral: Positive for decreased concentration. Negative for suicidal ideas and depressed mood. The patient does not have insomnia.    All other systems reviewed and are negative.      Objective   Physical Exam   Constitutional: He is oriented to person, place, and time. He is cooperative. He is obese.  HENT:   Head: Normocephalic and atraumatic.   Right Ear: Hearing, tympanic membrane, external ear and ear canal normal.   Left Ear: Hearing, tympanic membrane, external ear and ear canal normal.   Nose: Nose normal.   Mouth/Throat: Oropharynx is clear and moist.   Eyes: Conjunctivae and lids are normal.   Neck: Normal range of motion.   Cardiovascular: Normal rate and normal heart sounds.   Pulmonary/Chest: Effort normal and breath sounds normal.   Abdominal: Soft. Bowel sounds are normal.   Lymphadenopathy:        Head (right side): No submental, no submandibular and no tonsillar adenopathy present.        Head (left side): No submental, no submandibular and no tonsillar adenopathy present.   Neurological: He is alert and oriented to person, place, and time. He has normal motor skills, normal sensation and intact cranial nerves. Coordination normal.   Skin: Skin is warm and intact.   Psychiatric: His speech is normal and behavior is normal. Mood, memory, affect and judgment normal.   Nursing note and vitals reviewed.        Assessment/Plan   Danny was seen today for hyperlipidemia.    Diagnoses and all orders for this visit:    Frequency of urination  -     POCT urinalysis dipstick, multipro  -     POC Glycosylated Hemoglobin (Hb A1C)  -     Urine Culture - Urine, Urine, Clean Catch    Essential hypertension  -     lisinopril (PRINIVIL,ZESTRIL) 20 MG tablet; Take 1 tablet by mouth Daily.  -     Recommend to increase meds or add diuretic, pt declines  -     Encouraged pt to monitor his bp and if continues to be  elevated follow up  -     Encouraged pt to decrease sodium in diet      Impaired fasting glucose  -     POC Glycosylated Hemoglobin (Hb A1C)    Mixed hyperlipidemia  -     atorvastatin (LIPITOR) 20 MG tablet; Take 1 tablet by mouth Daily.    Erectile dysfunction, unspecified erectile dysfunction type  -     sildenafil (Viagra) 100 MG tablet; Take 1 tablet by mouth Daily As Needed for Erectile Dysfunction.    Return in about 6 months (around 3/9/2021) for Recheck chronic.    Electronically signed by Ronit Mccabe DNP, APRCHRISTOPHER, 09/09/20, 2:20 PM.      POC Glycosylated Hemoglobin (Hb A1C)   Order: [id number]   Status:  Final result   Visible to patient:  No (Not Released)   Next appt:  10/16/2020 at 07:45 AM in Family Medicine (Ronit Mccabe, YURIY, APRN)   Dx:  Frequency of urination; Impaired fast...   Specimen Information: Blood        Component  Ref Range & Units 14:19   Hemoglobin A1C  % 5.6    Resulting Agency [facility]OR[facility]         Specimen Collected: 09/09/20 14:19   Last Resulted: 09/09/20 14:19

## 2020-09-11 LAB
BACTERIA UR CULT: NO GROWTH
BACTERIA UR CULT: NORMAL

## 2020-09-17 ENCOUNTER — OFFICE VISIT (OUTPATIENT)
Age: 50
End: 2020-09-17

## 2020-09-17 VITALS — HEART RATE: 84 BPM | OXYGEN SATURATION: 97 % | TEMPERATURE: 97.1 F

## 2020-09-17 PROCEDURE — 99999 PR OFFICE/OUTPT VISIT,PROCEDURE ONLY: CPT | Performed by: NURSE PRACTITIONER

## 2020-09-20 LAB — SARS-COV-2, NAA: NOT DETECTED

## 2020-09-21 ENCOUNTER — ANESTHESIA EVENT (OUTPATIENT)
Dept: ENDOSCOPY | Age: 50
End: 2020-09-21
Payer: COMMERCIAL

## 2020-09-23 ENCOUNTER — HOSPITAL ENCOUNTER (OUTPATIENT)
Age: 50
Setting detail: OUTPATIENT SURGERY
Discharge: HOME | End: 2020-09-23
Attending: INTERNAL MEDICINE | Admitting: INTERNAL MEDICINE
Payer: COMMERCIAL

## 2020-09-23 ENCOUNTER — ANESTHESIA (OUTPATIENT)
Dept: ENDOSCOPY | Age: 50
End: 2020-09-23
Payer: COMMERCIAL

## 2020-09-23 VITALS
OXYGEN SATURATION: 93 % | RESPIRATION RATE: 1 BRPM | SYSTOLIC BLOOD PRESSURE: 114 MMHG | DIASTOLIC BLOOD PRESSURE: 63 MMHG

## 2020-09-23 VITALS
DIASTOLIC BLOOD PRESSURE: 75 MMHG | TEMPERATURE: 98.2 F | SYSTOLIC BLOOD PRESSURE: 127 MMHG | OXYGEN SATURATION: 97 % | RESPIRATION RATE: 18 BRPM | BODY MASS INDEX: 42.66 KG/M2 | WEIGHT: 315 LBS | HEART RATE: 77 BPM | HEIGHT: 72 IN

## 2020-09-23 PROCEDURE — 43239 EGD BIOPSY SINGLE/MULTIPLE: CPT | Performed by: INTERNAL MEDICINE

## 2020-09-23 PROCEDURE — 2580000003 HC RX 258: Performed by: INTERNAL MEDICINE

## 2020-09-23 PROCEDURE — 3609027000 HC COLONOSCOPY: Performed by: INTERNAL MEDICINE

## 2020-09-23 PROCEDURE — 7100000010 HC PHASE II RECOVERY - FIRST 15 MIN: Performed by: INTERNAL MEDICINE

## 2020-09-23 PROCEDURE — 2500000003 HC RX 250 WO HCPCS: Performed by: NURSE ANESTHETIST, CERTIFIED REGISTERED

## 2020-09-23 PROCEDURE — 2709999900 HC NON-CHARGEABLE SUPPLY: Performed by: INTERNAL MEDICINE

## 2020-09-23 PROCEDURE — 3609017100 HC EGD: Performed by: INTERNAL MEDICINE

## 2020-09-23 PROCEDURE — 3700000000 HC ANESTHESIA ATTENDED CARE: Performed by: INTERNAL MEDICINE

## 2020-09-23 PROCEDURE — 3700000001 HC ADD 15 MINUTES (ANESTHESIA): Performed by: INTERNAL MEDICINE

## 2020-09-23 PROCEDURE — 7100000011 HC PHASE II RECOVERY - ADDTL 15 MIN: Performed by: INTERNAL MEDICINE

## 2020-09-23 PROCEDURE — 45380 COLONOSCOPY AND BIOPSY: CPT | Performed by: INTERNAL MEDICINE

## 2020-09-23 PROCEDURE — 6360000002 HC RX W HCPCS: Performed by: NURSE ANESTHETIST, CERTIFIED REGISTERED

## 2020-09-23 PROCEDURE — 88305 TISSUE EXAM BY PATHOLOGIST: CPT

## 2020-09-23 RX ORDER — SODIUM CHLORIDE, SODIUM LACTATE, POTASSIUM CHLORIDE, CALCIUM CHLORIDE 600; 310; 30; 20 MG/100ML; MG/100ML; MG/100ML; MG/100ML
INJECTION, SOLUTION INTRAVENOUS CONTINUOUS
Status: DISCONTINUED | OUTPATIENT
Start: 2020-09-23 | End: 2020-09-23 | Stop reason: HOSPADM

## 2020-09-23 RX ORDER — PROPOFOL 10 MG/ML
INJECTION, EMULSION INTRAVENOUS PRN
Status: DISCONTINUED | OUTPATIENT
Start: 2020-09-23 | End: 2020-09-23 | Stop reason: SDUPTHER

## 2020-09-23 RX ORDER — MIDAZOLAM HYDROCHLORIDE 1 MG/ML
INJECTION INTRAMUSCULAR; INTRAVENOUS PRN
Status: DISCONTINUED | OUTPATIENT
Start: 2020-09-23 | End: 2020-09-23 | Stop reason: SDUPTHER

## 2020-09-23 RX ORDER — LIDOCAINE HYDROCHLORIDE 10 MG/ML
INJECTION, SOLUTION INFILTRATION; PERINEURAL PRN
Status: DISCONTINUED | OUTPATIENT
Start: 2020-09-23 | End: 2020-09-23 | Stop reason: SDUPTHER

## 2020-09-23 RX ORDER — ATORVASTATIN CALCIUM 20 MG/1
20 TABLET, FILM COATED ORAL DAILY
COMMUNITY
End: 2022-02-17 | Stop reason: DRUGHIGH

## 2020-09-23 RX ORDER — FENTANYL CITRATE 50 UG/ML
INJECTION, SOLUTION INTRAMUSCULAR; INTRAVENOUS PRN
Status: DISCONTINUED | OUTPATIENT
Start: 2020-09-23 | End: 2020-09-23 | Stop reason: SDUPTHER

## 2020-09-23 RX ADMIN — SODIUM CHLORIDE, SODIUM LACTATE, POTASSIUM CHLORIDE, AND CALCIUM CHLORIDE: 600; 310; 30; 20 INJECTION, SOLUTION INTRAVENOUS at 07:29

## 2020-09-23 RX ADMIN — LIDOCAINE HYDROCHLORIDE 40 MG: 10 INJECTION, SOLUTION INFILTRATION; PERINEURAL at 08:08

## 2020-09-23 RX ADMIN — PROPOFOL 350 MG: 10 INJECTION, EMULSION INTRAVENOUS at 08:08

## 2020-09-23 RX ADMIN — MIDAZOLAM 2 MG: 1 INJECTION INTRAMUSCULAR; INTRAVENOUS at 08:00

## 2020-09-23 RX ADMIN — FENTANYL CITRATE 50 MCG: 50 INJECTION INTRAMUSCULAR; INTRAVENOUS at 08:04

## 2020-09-23 RX ADMIN — FENTANYL CITRATE 50 MCG: 50 INJECTION INTRAMUSCULAR; INTRAVENOUS at 08:14

## 2020-09-23 ASSESSMENT — PAIN SCALES - GENERAL
PAINLEVEL_OUTOF10: 0
PAINLEVEL_OUTOF10: 0

## 2020-09-23 ASSESSMENT — PAIN - FUNCTIONAL ASSESSMENT: PAIN_FUNCTIONAL_ASSESSMENT: 0-10

## 2020-09-23 NOTE — OP NOTE
Endoscopic Procedure Note    Patient: Isaiah Cunningham : 1970  Med Rec#: [id number] Acc#: [id number]     Primary Care Provider SHEFALI Allen - CNP  Referring Provider: Sai Evangelista    Endoscopist: Margarito Vital MD    Date of Procedure:  2020    Procedure:   1. EGD with biopsy    Indications:   1. Garner's esophagus w/o h/o dysplasia       Anesthesia:  Sedation was administered by anesthesia who monitored the patient during the procedure. Estimated Blood Loss: minimal    Procedure:   After reviewing the patient's chart and obtaining informed consent, the patient was placed in the left lateral decubitus position. A forward-viewing Olympus endoscope was lubricated and inserted through the mouth into the oropharynx. Under direct visualization, the upper esophagus was intubated. The scope was advanced to the level of the third portion of duodenum. Scope was slowly withdrawn with careful inspection of the mucosal surfaces. The scope was retroflexed for inspection of the gastric fundus and incisura. Findings and maneuvers are listed in impression below. The patient tolerated the procedure well. The scope was removed. There were no immediate complications. Findings:   Esophagus: abnormal: there is circumferential encroachment of salmon colored mucosa extending from 21cm -35cm (top of gastric folds). No obvious ulcerations or raised lesions noted. Multiple biopsies obtained every 1-2 cm throughout the affected area. There is a small hiatal hernia present. Stomach:  normal      Duodenum: normal      IMPRESSION:  1. Long segment Garner's esophagus s/p multiple biopsies as above       RECOMMENDATIONS:    1. Await path results, the patient will be contacted in 7-10 days with biopsy results. 2.  Continue PPI daily  3. Repeat EGD in maximum of 2-3 yrs. The results were discussed with the patient and family. A copy of the images obtained were given to the patient.      Dayna Taylor MD  9/23/2020  8:26 AM

## 2020-09-23 NOTE — OP NOTE
Patient: Darleen Combs : 1970  [facility] Rec#: [id number] Acc#: [id number]   Primary Care Provider SHEFALI Fong CNP    Date of Procedure:  2020    Endoscopist: Cassius Persaud MD    Referring Provider: SHEFALI Fong CNP,     Operation Performed: Colonoscopy with biopsy    Indications: screening    Anesthesia:  Sedation was administered by anesthesia who monitored the patient during the procedure. I met with Darleen Combs prior to procedure. We discussed the procedure itself, and I have discussed the risks of endoscopy (including-- but not limited to-- pain, discomfort, bleeding potentially requiring second endoscopic procedure and/or blood transfusion, organ perforation requiring operative repair, damage to organs near the colon, infection, aspiration, cardiopulmonary/allergic reaction), benefits, indications to endoscopy. Additionally, we discussed options other than colonoscopy. The patient expressed understanding. All questions answered. The patient decided to proceed with the procedure. Signed informed consent was placed on the chart. Blood Loss: minimal    Withdrawal time: > 6 minutes  Bowel Prep: adequate     Complications: no immediate complications    DESCRIPTION OF PROCEDURE:     A time out was performed. After written informed consent was obtained, the patient was placed in the left lateral position. The perianal area was inspected, and a digital rectal exam was performed. A rectal exam was performed: normal tone, no palpable lesions. At this point, a forward viewing Olympus colonoscope was inserted into the anus and carefully advanced to the cecum. The cecum was identified by the ileocecal valve and the appendiceal orifice. The colonoscope was then slowly withdrawn with careful inspection of the mucosa in a linear and circumferential fashion. The scope was retroflexed in the rectum.  Suction was utilized during the procedure to remove as much air as possible from the bowel. The colonoscope was removed from the patient, and the procedure was terminated. Findings are listed below. Findings: The mucosa appeared normal throughout the entire examined colon     In the the transverse colon, a 4 mm sessile polyp was removed completely with forceps polypectomy. There was evidence of diverticular disease throughout the sigmoid colon. Retroflexion in the rectum was normal and revealed no further abnormalities         Recommendations:  1. Repeat colonoscopy: pending pathology - max of 5 yrs for screening  2. Await biopsy results-you will receive a letter with your results within 7-10 days    Findings and recommendations were discussed w/ the patient. A copy of the images was provided.     Sarbjit Soto MD  9/23/2020  8:41 AM [facility]

## 2020-09-23 NOTE — ANESTHESIA POSTPROCEDURE EVALUATION
Department of Anesthesiology  Postprocedure Note    Patient: Surendra Adan  MRN: [id number]  [place]: 1970  Date of evaluation: 9/23/2020  Time:  8:47 AM     Procedure Summary     Date:  09/23/20 Room / Location:  [address]    Anesthesia Start:  3923 Anesthesia Stop:      Procedures:       EGD ESOPHAGOGASTRODUODENOSCOPY (N/A )      COLORECTAL CANCER SCREENING, NOT HIGH RISK (N/A ) Diagnosis:  (CLN SCREEN, CHRONIC GERD, LONG SEGMENT BARRETTS)    Surgeon:  Cheyenne Kruse MD Responsible Provider:  SHEFALI Curiel CRNA    Anesthesia Type:  general, TIVA ASA Status:  3          Anesthesia Type: No value filed. Bijan Phase I: Bijan Score: 10    Bijan Phase II:      Last vitals: Reviewed and per EMR flowsheets.        Anesthesia Post Evaluation    Patient location during evaluation: bedside  Patient participation: complete - patient participated  Level of consciousness: sleepy but conscious  Pain score: 0  Airway patency: patent  Nausea & Vomiting: no nausea and no vomiting  Complications: no  Cardiovascular status: hemodynamically stable and blood pressure returned to baseline  Respiratory status: acceptable and nasal cannula  Hydration status: stable

## 2020-09-23 NOTE — H&P
Patient Name: Sav Garay  : 1970  MRN: [id number]  DATE: 20    Allergies: No Known Allergies     ENDOSCOPY  History and Physical    Procedure:    [] Diagnostic Colonoscopy       [x] Screening Colonoscopy  [x] EGD      [] ERCP      [] EUS       [] Other    [x] Previous office notes/History and Physical reviewed from the patients chart. Please see EMR for further details of HPI. I have examined the patient's status immediately prior to the procedure and:      Indications/HPI:    []Abdominal Pain   []Cancer- GI/Lung     []Fhx of colon CA/polyps  []History of Polyps  [x]Barretts            []Melena  []Abnormal Imaging              []Dysphagia              []Persistent Pneumonia   []Anemia                            []Food Impaction        []History of Polyps  [] GI Bleed             []Pulmonary nodule/Mass   []Change in bowel habits []Heartburn/Reflux  []Rectal Bleed (BRBPR)  []Chest Pain - Non Cardiac []Heme (+) Stool []Ulcers  []Constipation  []Hemoptysis  []Varices  []Diarrhea  []Hypoxemia    []Nausea/Vomiting   [x]Screening   []Crohns/Colitis  []Other:     Anesthesia:   [x] MAC [] Moderate Sedation   [] General   [] None     ROS: 12 pt Review of Symptoms was negative unless mentioned above    Medications:   Prior to Admission medications    Medication Sig Start Date End Date Taking? Authorizing Provider   atorvastatin (LIPITOR) 20 MG tablet Take 20 mg by mouth daily   Yes Historical Provider, MD   tiZANidine (ZANAFLEX) 4 MG capsule Take 4 mg by mouth 2 times daily  20  Yes Historical Provider, MD   RABEprazole (ACIPHEX) 20 MG tablet Take 1 tablet by mouth daily 20  Yes SHEFALI Dixon   Ascorbic Acid (VITAMIN C) 500 MG CHEW Take 500 mg by mouth daily   Yes Historical Provider, MD   lisinopril (PRINIVIL;ZESTRIL) 10 MG tablet  7/31/15  Yes Historical Provider, MD   therapeutic multivitamin-minerals (THERAGRAN-M) tablet Take 1 tablet by mouth daily.    Yes Historical Provider, MD °C)   SpO2: 98%        Physical Exam:  Cardiac:  [x]WNL  []Comments:  Pulmonary:  [x]WNL   []Comments:  Neuro/Mental Status:  [x]WNL  []Comments:  Abdominal:  [x]WNL    []Comments:  Other:   []WNL  []Comments:    Informed Consent:  The risks and benefits of the procedure have been discussed with either the patient or if they cannot consent, their representative. Assessment:  Patient examined and appropriate for planned sedation and procedure. Plan:  Proceed with planned sedation and procedure as above.          Raffaele Miranda MD

## 2020-09-23 NOTE — ANESTHESIA PRE PROCEDURE
Department of Anesthesiology  Preprocedure Note       Name:  Marylu Etienne   Age:  48 y.o.  :  1970                                          MRN:  [id number]         Date:  2020      Surgeon: Sandy Okeefe):  German Justin MD    Procedure: Procedure(s):  EGD ESOPHAGOGASTRODUODENOSCOPY  COLORECTAL CANCER SCREENING, NOT HIGH RISK    Medications prior to admission:   Prior to Admission medications    Medication Sig Start Date End Date Taking? Authorizing Provider   atorvastatin (LIPITOR) 20 MG tablet Take 20 mg by mouth daily   Yes Historical Provider, MD   tiZANidine (ZANAFLEX) 4 MG capsule Take 4 mg by mouth 2 times daily  20  Yes Historical Provider, MD   RABEprazole (ACIPHEX) 20 MG tablet Take 1 tablet by mouth daily 20  Yes SHEFALI Hawkins   Ascorbic Acid (VITAMIN C) 500 MG CHEW Take 500 mg by mouth daily   Yes Historical Provider, MD   lisinopril (PRINIVIL;ZESTRIL) 10 MG tablet  7/31/15  Yes Historical Provider, MD   therapeutic multivitamin-minerals (THERAGRAN-M) tablet Take 1 tablet by mouth daily. Yes Historical Provider, MD   Glucosamine 500 MG CAPS Take by mouth    Historical Provider, MD   aspirin 81 MG EC tablet Take 81 mg by mouth daily. Historical Provider, MD   Psyllium (METAMUCIL PO) Take  by mouth 2 times daily.     Historical Provider, MD       Current medications:    Current Facility-Administered Medications   Medication Dose Route Frequency Provider Last Rate Last Dose    lactated ringers infusion   Intravenous Continuous German Justin  mL/hr at 20 0729         Allergies:  No Known Allergies    Problem List:    Patient Active Problem List   Diagnosis Code    Garner's esophagus without dysplasia K22.70    Heartburn R12    Weight loss, intentional [id number]    Long segment Garner's esophagus K22.70    Chronic GERD K21.9    Garner's esophagus determined by biopsy K22.70       Past Medical History:        Diagnosis Date    Garner's esophagus     GERD (gastroesophageal reflux disease)     Weight loss        Past Surgical History:        Procedure Laterality Date    APPENDECTOMY      UPPER GASTROINTESTINAL ENDOSCOPY  2011        UPPER GASTROINTESTINAL ENDOSCOPY  2012        UPPER GASTROINTESTINAL ENDOSCOPY  10/7/10    Dr Griselda Durham  9-3-10    Dr Griselda Durham  2014    Dr Mack Lopez: long segment Madden's surveillance, biopsies neg for dysplasia    UPPER GASTROINTESTINAL ENDOSCOPY  2015    long segment Madden's surveillance, biopsies neg for dysplasia    UPPER GASTROINTESTINAL ENDOSCOPY  2016    Dr Mack Lopez [facility]hiatal hernia, (+) Long seg Madden's (from 23 cm to 30 cm, not 34-36 cm), (-) dysplasia--1 yr recall and referral to Paulina Starr for ablation therapy    UPPER GASTROINTESTINAL ENDOSCOPY  2017    Dr Dolores Saint [facility]-Long seg madden's (+) dysplasia (-), 1 yr recall, ablation referral also    UPPER GASTROINTESTINAL ENDOSCOPY  2019    Dr Jordan Yoo [facility]-Distal esophagitis, gastritis (+) Madden's (-) dysplasia, 3 yr recall    VASECTOMY         Social History:    Social History     Tobacco Use    Smoking status: Former Smoker     Packs/day: 1.00     Years: 1.00     Pack years: 1.00     Last attempt to quit: 2006     Years since quittin.0    Smokeless tobacco: Never Used   Substance Use Topics    Alcohol use: Yes     Comment: occasional beer                                Counseling given: Not Answered      Vital Signs (Current):   Vitals:    20 0723   BP: 139/77   Pulse: 77   Resp: 20   Temp: 98.2 °F (36.8 °C)   TempSrc: Temporal   SpO2: 98%   Weight: (!) 330 lb (149.7 kg)   Height: 6' (1.829 m)                                              BP Readings from Last 3 Encounters:   20 139/77   20 120/80   01/15/19 (!) 140/84       NPO Status: Time of last liquid consumption: 0200 Time of last solid consumption: 1700                        Date of last liquid consumption: 09/23/20                        Date of last solid food consumption: 09/21/20    BMI:   Wt Readings from Last 3 Encounters:   09/23/20 (!) 330 lb (149.7 kg)   08/25/20 (!) 330 lb (149.7 kg)   01/15/19 (!) 314 lb (142.4 kg)     Body mass index is 44.76 kg/m². CBC: No results found for: WBC, RBC, HGB, HCT, MCV, RDW, PLT    CMP: No results found for: NA, K, CL, CO2, BUN, CREATININE, GFRAA, AGRATIO, LABGLOM, GLUCOSE, PROT, CALCIUM, BILITOT, ALKPHOS, AST, ALT    POC Tests: No results for input(s): POCGLU, POCNA, POCK, POCCL, POCBUN, POCHEMO, POCHCT in the last 72 hours. Coags: No results found for: PROTIME, INR, APTT    HCG (If Applicable): No results found for: PREGTESTUR, PREGSERUM, HCG, HCGQUANT     ABGs: No results found for: PHART, PO2ART, USE7KOY, ZXS7QEH, BEART, I4YAQTUP     Type & Screen (If Applicable):  No results found for: LABABO, LABRH    Drug/Infectious Status (If Applicable):  No results found for: HIV, HEPCAB    COVID-19 Screening (If Applicable):   Lab Results   Component Value Date    COVID19 NOT DETECTED 09/17/2020         Anesthesia Evaluation  Patient summary reviewed and Nursing notes reviewed no history of anesthetic complications:   Airway: Mallampati: II  TM distance: >3 FB   Neck ROM: full  Mouth opening: < 3 FB Dental: normal exam         Pulmonary:normal exam    (+) sleep apnea: on CPAP,                             Cardiovascular:    (+) hypertension:,          Beta Blocker:  Not on Beta Blocker         Neuro/Psych:   Negative Neuro/Psych ROS              GI/Hepatic/Renal:   (+) GERD: well controlled, bowel prep, morbid obesity         ROS comment: bmi 45. Endo/Other: Negative Endo/Other ROS                    Abdominal:           Vascular: negative vascular ROS.                                        Anesthesia Plan      general and TIVA     ASA 3       Induction: intravenous. Anesthetic plan and risks discussed with patient.                       Lyndy Cockayne, APRN - CRNA   9/23/2020

## 2020-10-01 DIAGNOSIS — N52.9 ERECTILE DYSFUNCTION, UNSPECIFIED ERECTILE DYSFUNCTION TYPE: ICD-10-CM

## 2020-10-01 DIAGNOSIS — E78.2 MIXED HYPERLIPIDEMIA: ICD-10-CM

## 2020-10-01 DIAGNOSIS — I10 ESSENTIAL HYPERTENSION: ICD-10-CM

## 2020-10-01 RX ORDER — ATORVASTATIN CALCIUM 20 MG/1
20 TABLET, FILM COATED ORAL DAILY
Qty: 90 TABLET | Refills: 1 | Status: SHIPPED | OUTPATIENT
Start: 2020-10-01 | End: 2021-03-09 | Stop reason: SDUPTHER

## 2020-10-01 RX ORDER — LISINOPRIL 20 MG/1
20 TABLET ORAL DAILY
Qty: 90 TABLET | Refills: 1 | Status: SHIPPED | OUTPATIENT
Start: 2020-10-01 | End: 2021-03-09 | Stop reason: SDUPTHER

## 2020-10-01 NOTE — TELEPHONE ENCOUNTER
Refill request. This has been filled at [facility] and he needs it to go to [facility].  He reports the Atorvastatin is working good.  I have cancelled all existing orders from [facility].

## 2020-10-02 RX ORDER — SILDENAFIL 100 MG/1
100 TABLET, FILM COATED ORAL DAILY PRN
Qty: 30 TABLET | Refills: 1 | Status: SHIPPED | OUTPATIENT
Start: 2020-10-02 | End: 2021-01-12

## 2020-11-12 RX ORDER — ESOMEPRAZOLE MAGNESIUM 40 MG/1
40 CAPSULE, DELAYED RELEASE ORAL 2 TIMES DAILY
Qty: 180 CAPSULE | Refills: 2 | Status: SHIPPED | OUTPATIENT
Start: 2020-11-12 | End: 2021-05-03 | Stop reason: DRUGHIGH

## 2021-01-12 DIAGNOSIS — N52.9 ERECTILE DYSFUNCTION, UNSPECIFIED ERECTILE DYSFUNCTION TYPE: ICD-10-CM

## 2021-01-12 RX ORDER — SILDENAFIL 100 MG/1
TABLET, FILM COATED ORAL
Qty: 30 TABLET | Refills: 5 | Status: SHIPPED | OUTPATIENT
Start: 2021-01-12 | End: 2022-02-21

## 2021-03-09 ENCOUNTER — OFFICE VISIT (OUTPATIENT)
Dept: FAMILY MEDICINE CLINIC | Facility: CLINIC | Age: 51
End: 2021-03-09

## 2021-03-09 VITALS
TEMPERATURE: 97.7 F | DIASTOLIC BLOOD PRESSURE: 88 MMHG | WEIGHT: 315 LBS | HEART RATE: 90 BPM | SYSTOLIC BLOOD PRESSURE: 137 MMHG | OXYGEN SATURATION: 98 % | RESPIRATION RATE: 16 BRPM | BODY MASS INDEX: 42.66 KG/M2 | HEIGHT: 72 IN

## 2021-03-09 DIAGNOSIS — I10 ESSENTIAL HYPERTENSION: Primary | ICD-10-CM

## 2021-03-09 DIAGNOSIS — M25.562 CHRONIC PAIN OF BOTH KNEES: ICD-10-CM

## 2021-03-09 DIAGNOSIS — M25.561 CHRONIC PAIN OF BOTH KNEES: ICD-10-CM

## 2021-03-09 DIAGNOSIS — E66.01 MORBIDLY OBESE (HCC): ICD-10-CM

## 2021-03-09 DIAGNOSIS — G89.29 CHRONIC PAIN OF BOTH KNEES: ICD-10-CM

## 2021-03-09 DIAGNOSIS — E78.2 MIXED HYPERLIPIDEMIA: ICD-10-CM

## 2021-03-09 DIAGNOSIS — M25.511 ACUTE PAIN OF RIGHT SHOULDER: ICD-10-CM

## 2021-03-09 PROCEDURE — 99214 OFFICE O/P EST MOD 30 MIN: CPT | Performed by: NURSE PRACTITIONER

## 2021-03-09 RX ORDER — LISINOPRIL 20 MG/1
20 TABLET ORAL DAILY
Qty: 90 TABLET | Refills: 1 | Status: SHIPPED | OUTPATIENT
Start: 2021-03-09 | End: 2021-09-08

## 2021-03-09 RX ORDER — ATORVASTATIN CALCIUM 20 MG/1
20 TABLET, FILM COATED ORAL DAILY
Qty: 90 TABLET | Refills: 1 | Status: SHIPPED | OUTPATIENT
Start: 2021-03-09 | End: 2021-09-08

## 2021-03-09 RX ORDER — TIZANIDINE HYDROCHLORIDE 4 MG/1
4 CAPSULE, GELATIN COATED ORAL 2 TIMES DAILY
Qty: 180 CAPSULE | Refills: 3 | Status: SHIPPED | OUTPATIENT
Start: 2021-03-09 | End: 2021-09-15 | Stop reason: CLARIF

## 2021-03-09 NOTE — PATIENT INSTRUCTIONS
Obesity, Adult  Obesity is having too much body fat. Being obese means that your weight is more than what is healthy for you.  BMI is a number that explains how much body fat you have. If you have a BMI of 30 or more, you are obese. Obesity is often caused by eating or drinking more calories than your body uses. Changing your lifestyle can help you lose weight.  Obesity can cause serious health problems, such as:  · Stroke.  · Coronary artery disease (CAD).  · Type 2 diabetes.  · Some types of cancer, including cancers of the colon, breast, uterus, and gallbladder.  · Osteoarthritis.  · High blood pressure (hypertension).  · High cholesterol.  · Sleep apnea.  · Gallbladder stones.  · Infertility problems.  What are the causes?  · Eating meals each day that are high in calories, sugar, and fat.  · Being born with genes that may make you more likely to become obese.  · Having a medical condition that causes obesity.  · Taking certain medicines.  · Sitting a lot (having a sedentary lifestyle).  · Not getting enough sleep.  · Drinking a lot of drinks that have sugar in them.  What increases the risk?  · Having a family history of obesity.  · Being an  woman.  · Being a  man.  · Living in an area with limited access to:  ? [place], recreation centers, or sidewalks.  ? Healthy food choices, such as grocery stores and [employer] markets.  What are the signs or symptoms?  The main sign is having too much body fat.  How is this treated?  · Treatment for this condition often includes changing your lifestyle. Treatment may include:  ? Changing your diet. This may include making a healthy meal plan.  ? Exercise. This may include activity that causes your heart to beat faster (aerobic exercise) and strength training. Work with your doctor to design a program that works for you.  ? Medicine to help you lose weight. This may be used if you are not able to lose 1 pound a week after 6 weeks of healthy eating and  more exercise.  ? Treating conditions that cause the obesity.  ? Surgery. Options may include gastric banding and gastric bypass. This may be done if:  § Other treatments have not helped to improve your condition.  § You have a BMI of 40 or higher.  § You have life-threatening health problems related to obesity.  Follow these instructions at home:  Eating and drinking    · Follow advice from your doctor about what to eat and drink. Your doctor may tell you to:  ? Limit fast food, sweets, and processed snack foods.  ? Choose low-fat options. For example, choose low-fat milk instead of whole milk.  ? Eat 5 or more servings of fruits or vegetables each day.  ? Eat at home more often. This gives you more control over what you eat.  ? Choose healthy foods when you eat out.  ? Learn to read food labels. This will help you learn how much food is in 1 serving.  ? Keep low-fat snacks available.  ? Avoid drinks that have a lot of sugar in them. These include soda, fruit juice, iced tea with sugar, and flavored milk.  · Drink enough water to keep your pee (urine) pale yellow.  · Do not go on fad diets.  Physical activity  · Exercise often, as told by your doctor. Most adults should get up to 150 minutes of moderate-intensity exercise every week.Ask your doctor:  ? What types of exercise are safe for you.  ? How often you should exercise.  · Warm up and stretch before being active.  · Do slow stretching after being active (cool down).  · Rest between times of being active.  Lifestyle  · Work with your doctor and a food expert (dietitian) to set a weight-loss goal that is best for you.  · Limit your screen time.  · Find ways to reward yourself that do not involve food.  · Do not drink alcohol if:  ? Your doctor tells you not to drink.  ? You are pregnant, may be pregnant, or are planning to become pregnant.  · If you drink alcohol:  ? Limit how much you use to:  § 0-1 drink a day for women.  § 0-2 drinks a day for men.  ? Be  aware of how much alcohol is in your drink. In the [place] one drink equals one 12 oz bottle of beer (355 mL), one 5 oz glass of wine (148 mL), or one 1½ oz glass of hard liquor (44 mL).  General instructions  · Keep a weight-loss journal. This can help you keep track of:  ? The food that you eat.  ? How much exercise you get.  · Take over-the-counter and prescription medicines only as told by your doctor.  · Take vitamins and supplements only as told by your doctor.  · Think about joining a support group.  · Keep all follow-up visits as told by your doctor. This is important.  Contact a doctor if:  · You cannot meet your weight loss goal after you have changed your diet and lifestyle for 6 weeks.  Get help right away if you:  · Are having trouble breathing.  · Are having thoughts of harming yourself.  Summary  · Obesity is having too much body fat.  · Being obese means that your weight is more than what is healthy for you.  · Work with your doctor to set a weight-loss goal.  · Get regular exercise as told by your doctor.  This information is not intended to replace advice given to you by your health care provider. Make sure you discuss any questions you have with your health care provider.  Document Revised: 08/22/2019 Document Reviewed: 08/22/2019  Elsevier Patient Education © 2020 [employer]

## 2021-03-09 NOTE — PROGRESS NOTES
Chief Complaint  Urinary Frequency (follow up)    Subjective      Danny Vega presents to [facility]  HTN, Hyperlipidemia, GERD, ED.  Says bp has been at goal, denies any chest pain, shortness of breath or palpitations.  Gerd is well controlled no problems, and ED is alittle better with the viagra.  He continues to complain about shoulder pain and knee pain.     Hypertension  This is a chronic problem. The current episode started more than 1 year ago. The problem is unchanged. The problem is controlled. Pertinent negatives include no blurred vision, chest pain, neck pain, orthopnea, PND or shortness of breath. There are no associated agents to hypertension. Risk factors for coronary artery disease include male gender, obesity, dyslipidemia and family history. Past treatments include ACE inhibitors. Current antihypertension treatment includes ACE inhibitors. The current treatment provides mild improvement. There are no compliance problems.  There is no history of kidney disease, heart failure or retinopathy. There is no history of coarctation of the aorta, hyperaldosteronism, pheochromocytoma or renovascular disease.   Hyperlipidemia  This is a chronic problem. The current episode started more than 1 year ago. The problem is controlled. Recent lipid tests were reviewed and are high. Exacerbating diseases include obesity. He has no history of diabetes. Pertinent negatives include no chest pain or shortness of breath. Current antihyperlipidemic treatment includes statins. The current treatment provides mild improvement of lipids. There are no compliance problems.  Risk factors for coronary artery disease include dyslipidemia, family history, hypertension, male sex, obesity and a sedentary lifestyle.   Heartburn  He complains of heartburn. He reports no chest pain, no choking, no coughing, no stridor or no tooth decay. This is a chronic problem. The current episode started more than  1 year ago. The problem occurs occasionally. The problem has been gradually improving. The heartburn is located in the substernum. The heartburn is of mild intensity. The heartburn does not wake him from sleep. The heartburn does not limit his activity. The heartburn doesn't change with position. The symptoms are aggravated by certain foods and caffeine. Risk factors include Somers's esophagus. He has tried an antacid and a PPI for the symptoms. The treatment provided mild relief. Past procedures do not include an abdominal ultrasound, an EGD, esophageal pH monitoring or H. pylori antibody titer.   Erectile Dysfunction  This is a chronic problem. The current episode started more than 1 year ago. The problem has been gradually improving since onset. The nature of his difficulty is achieving erection and maintaining erection. Non-physiologic factors contributing to erectile dysfunction are a decreased libido and performance anxiety. He reports his erection duration to be less than 1 minute. Irritative symptoms do not include frequency. Obstructive symptoms do not include dribbling, incomplete emptying, an intermittent stream, a slower stream, straining or a weak stream. Pertinent negatives include no chills, dysuria, genital pain, hematuria, hesitancy or inability to urinate. Nothing aggravates the symptoms. Past treatments include sildenafil. The treatment provided moderate relief. He has been using treatment for 1 to 2 years. He has had no adverse reactions caused by medications. Risk factors include hypertension.   Muscle Pain  This is a chronic problem. The current episode started more than 1 year ago. The problem occurs intermittently. The problem is unchanged. The context of the pain is unknown. The pain is present in the left knee, right knee and right shoulder. Pertinent negatives include no chest pain, dysuria or shortness of breath. Past treatments include acetaminophen and rest. The treatment provided mild  "relief. There is no swelling present. He has been behaving normally. There is no history of chronic back pain, rheumatic disease or sickle cell disease.     Chronic problems:  Hyperlipidemia stablew ith atorvastatin, gerd/barretts esophagus stable with esomeprazole and rabeprazole, htn controlled with lisinopril. ED stable with viagra    Objective   Vital Signs:   /88 (BP Location: Left arm, Patient Position: Sitting, Cuff Size: Large Adult)   Pulse 90   Temp 97.7 °F (36.5 °C)   Resp 16   Ht 182.9 cm (72\")   Wt (!) 147 kg (324 lb)   SpO2 98%   BMI 43.94 kg/m²       Physical Exam  Vitals and nursing note reviewed.   Constitutional:       General: He is awake.      Appearance: Normal appearance. He is well-developed and well-groomed. He is morbidly obese.   HENT:      Head: Normocephalic and atraumatic.      Right Ear: Hearing normal.      Left Ear: Hearing normal.      Nose: Nose normal.      Right Sinus: No maxillary sinus tenderness or frontal sinus tenderness.      Left Sinus: No maxillary sinus tenderness or frontal sinus tenderness.   Cardiovascular:      Rate and Rhythm: Regular rhythm. Tachycardia present.      Heart sounds: Normal heart sounds.   Pulmonary:      Effort: Pulmonary effort is normal.      Breath sounds: Normal breath sounds and air entry.   Abdominal:      General: Abdomen is flat. Bowel sounds are normal.      Palpations: Abdomen is soft.   Musculoskeletal:      Right lower leg: No edema.      Left lower leg: No edema.   Lymphadenopathy:      Head:      Right side of head: No submental, submandibular or tonsillar adenopathy.      Left side of head: No submental, submandibular or tonsillar adenopathy.   Skin:     General: Skin is warm and dry.      Capillary Refill: Capillary refill takes less than 2 seconds.   Neurological:      Mental Status: He is alert and oriented to person, place, and time.      Cranial Nerves: Cranial nerves are intact.      Sensory: Sensation is intact.      " Motor: Motor function is intact.      Coordination: Coordination is intact.      Gait: Gait is intact.   Psychiatric:         Attention and Perception: Attention and perception normal.         Mood and Affect: Mood and affect normal.         Speech: Speech normal.         Behavior: Behavior normal. Behavior is cooperative.         Thought Content: Thought content normal.       Assessment and Plan   Diagnoses and all orders for this visit:    1. Essential hypertension (Primary)  -     CBC (No Diff)  -     Comprehensive metabolic panel  -     lisinopril (PRINIVIL,ZESTRIL) 20 MG tablet; Take 1 tablet by mouth Daily.  Dispense: 90 tablet; Refill: 1    2. Mixed hyperlipidemia  -     Lipid panel  -     atorvastatin (LIPITOR) 20 MG tablet; Take 1 tablet by mouth Daily.  Dispense: 90 tablet; Refill: 1    3. Acute pain of right shoulder  -     TiZANidine (ZANAFLEX) 4 MG capsule; Take 1 capsule by mouth 2 (Two) Times a Day.  Dispense: 180 capsule; Refill: 3    4. Chronic pain of both knees  -     Ambulatory Referral to Orthopedic Surgery    5. Morbidly obese (CMS/[facility])      -  Patient's (Body mass index is 43.94 kg/m².) indicates that they are morbidly obese (BMI > 40) with obesity-related health conditions that include hypertension, GERD and ed, knee and shoulder pain . Obesity is improving with lifestyle modifications. BMI is is above average; BMI management plan is completed. We discussed portion control, increasing exercise and joining a fitness center or start home based exercise program.  He has been doing lifestyle changed and was 334 lb in September and now he is 324 lbs          I spent 12 minutes caring for Danny on this date of service. This time includes time spent by me in the following activities:preparing for the visit, performing a medically appropriate examination and/or evaluation , counseling and educating the patient/family/caregiver, ordering medications, tests, or procedures, referring and communicating  with other health care professionals , documenting information in the medical record and care coordination     Follow Up     Return in about 6 months (around 9/9/2021) for Recheck chronic problems, schedule annual well, Annual physical.  Patient was given instructions and counseling regarding his condition or for health maintenance advice. Please see specific information pulled into the AVS if appropriate.       Answers for HPI/ROS submitted by the patient on 3/8/2021  What is the primary reason for your visit?: Other  Please describe your symptoms.: Reauthorization for medication  Have you had these symptoms before?: Yes  How long have you been having these symptoms?: Greater than 2 weeks  Please list any medications you are currently taking for this condition.: Lisinopril  Please describe any probable cause for these symptoms. : Fat    Electronically signed by Ronit Mccabe DNP, APRN, 03/19/21, 6:46 AM CDT.

## 2021-03-11 LAB
ALBUMIN SERPL-MCNC: 4.1 G/DL (ref 3.5–5.2)
ALBUMIN/GLOB SERPL: 1.6 G/DL
ALP SERPL-CCNC: 68 U/L (ref 39–117)
ALT SERPL-CCNC: 18 U/L (ref 1–41)
AST SERPL-CCNC: 17 U/L (ref 1–40)
BILIRUB SERPL-MCNC: 0.3 MG/DL (ref 0–1.2)
BUN SERPL-MCNC: 14 MG/DL (ref 6–20)
BUN/CREAT SERPL: 15.6 (ref 7–25)
CALCIUM SERPL-MCNC: 9.4 MG/DL (ref 8.6–10.5)
CHLORIDE SERPL-SCNC: 101 MMOL/L (ref 98–107)
CHOLEST SERPL-MCNC: 130 MG/DL (ref 0–200)
CO2 SERPL-SCNC: 28 MMOL/L (ref 22–29)
CREAT SERPL-MCNC: 0.9 MG/DL (ref 0.76–1.27)
ERYTHROCYTE [DISTWIDTH] IN BLOOD BY AUTOMATED COUNT: 13.3 % (ref 12.3–15.4)
GLOBULIN SER CALC-MCNC: 2.6 GM/DL
GLUCOSE SERPL-MCNC: 147 MG/DL (ref 65–99)
HCT VFR BLD AUTO: 43.6 % (ref 37.5–51)
HDLC SERPL-MCNC: 34 MG/DL (ref 40–60)
HGB BLD-MCNC: 14.6 G/DL (ref 13–17.7)
LDLC SERPL CALC-MCNC: 67 MG/DL (ref 0–100)
MCH RBC QN AUTO: 30.5 PG (ref 26.6–33)
MCHC RBC AUTO-ENTMCNC: 33.5 G/DL (ref 31.5–35.7)
MCV RBC AUTO: 91.2 FL (ref 79–97)
PLATELET # BLD AUTO: 259 10*3/MM3 (ref 140–450)
POTASSIUM SERPL-SCNC: 5.1 MMOL/L (ref 3.5–5.2)
PROT SERPL-MCNC: 6.7 G/DL (ref 6–8.5)
RBC # BLD AUTO: 4.78 10*6/MM3 (ref 4.14–5.8)
SODIUM SERPL-SCNC: 137 MMOL/L (ref 136–145)
TRIGL SERPL-MCNC: 171 MG/DL (ref 0–150)
VLDLC SERPL CALC-MCNC: 29 MG/DL (ref 5–40)
WBC # BLD AUTO: 9.49 10*3/MM3 (ref 3.4–10.8)

## 2021-03-12 DIAGNOSIS — R73.01 IMPAIRED FASTING GLUCOSE: Primary | ICD-10-CM

## 2021-03-16 LAB — HBA1C MFR BLD: 5.7 % (ref 4.8–5.6)

## 2021-03-26 ENCOUNTER — OFFICE VISIT (OUTPATIENT)
Dept: FAMILY MEDICINE CLINIC | Facility: CLINIC | Age: 51
End: 2021-03-26

## 2021-03-26 VITALS
OXYGEN SATURATION: 98 % | HEART RATE: 75 BPM | DIASTOLIC BLOOD PRESSURE: 80 MMHG | TEMPERATURE: 98.7 F | WEIGHT: 315 LBS | HEIGHT: 72 IN | RESPIRATION RATE: 18 BRPM | BODY MASS INDEX: 42.66 KG/M2 | SYSTOLIC BLOOD PRESSURE: 132 MMHG

## 2021-03-26 DIAGNOSIS — J06.9 UPPER RESPIRATORY TRACT INFECTION, UNSPECIFIED TYPE: Primary | ICD-10-CM

## 2021-03-26 PROCEDURE — 99213 OFFICE O/P EST LOW 20 MIN: CPT | Performed by: NURSE PRACTITIONER

## 2021-03-26 RX ORDER — CEFDINIR 300 MG/1
300 CAPSULE ORAL 2 TIMES DAILY
Qty: 14 CAPSULE | Refills: 0 | Status: SHIPPED | OUTPATIENT
Start: 2021-03-26 | End: 2021-04-02

## 2021-03-26 RX ORDER — DEXTROMETHORPHAN HYDROBROMIDE AND PROMETHAZINE HYDROCHLORIDE 15; 6.25 MG/5ML; MG/5ML
5 SYRUP ORAL 4 TIMES DAILY PRN
Qty: 120 ML | Refills: 0 | Status: SHIPPED | OUTPATIENT
Start: 2021-03-26 | End: 2021-09-17

## 2021-03-26 RX ORDER — PREDNISONE 20 MG/1
20 TABLET ORAL DAILY
Qty: 5 TABLET | Refills: 0 | Status: SHIPPED | OUTPATIENT
Start: 2021-03-26 | End: 2021-03-31

## 2021-03-26 NOTE — PROGRESS NOTES
"Chief Complaint  URI    Subjective    Danny Vega presents to [facility]  Allergies/sinuses.  Has been struggling with seasonal allergies and taking allergies meds but symptoms have gotten progressively worse. Denies chest pain, shortness of breath or palpitations.  He has associated chills, green secretions from nose and just feeling bad     URI   This is a new problem. The current episode started in the past 7 days. The problem has been gradually worsening. There has been no fever. Associated symptoms include congestion, coughing, sinus pain, sneezing and wheezing. Pertinent negatives include no dysuria, ear pain or nausea. He has tried nothing for the symptoms. The treatment provided mild relief.   Cough  Associated symptoms include wheezing. Pertinent negatives include no ear pain.       Objective   Vital Signs:   /80 (BP Location: Left arm, Patient Position: Sitting, Cuff Size: Large Adult)   Pulse 75   Resp 18   Ht 182.9 cm (72\") Comment: per patient  Wt (!) 147 kg (323 lb)   SpO2 98%   BMI 43.81 kg/m²       Chronic problems:  Morbid obesity, OMERO stable with CPAP, hyperlipidemia stable with atorvastatin, GERD stable with aciphex and nexium, ED stable with sildenafil, HTN stable with lisinopril, muscle spasms stable with tizanidine.       Physical Exam  Vitals and nursing note reviewed.   Constitutional:       General: He is awake.      Appearance: Normal appearance. He is well-developed and well-groomed. He is morbidly obese.   HENT:      Head: Normocephalic.      Right Ear: Hearing, tympanic membrane, ear canal and external ear normal.      Left Ear: Hearing, tympanic membrane, ear canal and external ear normal.      Nose:      Right Sinus: Maxillary sinus tenderness and frontal sinus tenderness present.      Left Sinus: Maxillary sinus tenderness and frontal sinus tenderness present.      Mouth/Throat:      Lips: Pink.      Mouth: Mucous membranes are moist. "      Pharynx: Posterior oropharyngeal erythema present.   Cardiovascular:      Rate and Rhythm: Normal rate and regular rhythm.      Heart sounds: Normal heart sounds.   Pulmonary:      Effort: Pulmonary effort is normal.      Breath sounds: Normal breath sounds and air entry.   Abdominal:      General: Abdomen is flat. Bowel sounds are normal.   Lymphadenopathy:      Head:      Right side of head: No submental, submandibular or tonsillar adenopathy.      Left side of head: No submental, submandibular or tonsillar adenopathy.   Skin:     General: Skin is warm and dry.      Capillary Refill: Capillary refill takes less than 2 seconds.   Neurological:      Mental Status: He is alert.      Sensory: Sensation is intact.      Motor: Motor function is intact.      Coordination: Coordination is intact.      Gait: Gait is intact.   Psychiatric:         Attention and Perception: Attention and perception normal.         Mood and Affect: Mood and affect normal.         Speech: Speech normal.         Behavior: Behavior is uncooperative.         Thought Content: Thought content normal.         Cognition and Memory: Cognition normal.         Judgment: Judgment normal.       Assessment and Plan   Diagnoses and all orders for this visit:    1. Upper respiratory tract infection, unspecified type (Primary)  -     cefdinir (OMNICEF) 300 MG capsule; Take 1 capsule by mouth 2 (Two) Times a Day for 7 days.  Dispense: 14 capsule; Refill: 0  -     predniSONE (DELTASONE) 20 MG tablet; Take 1 tablet by mouth Daily for 5 days.  Dispense: 5 tablet; Refill: 0  -     promethazine-dextromethorphan (PROMETHAZINE-DM) 6.25-15 MG/5ML syrup; Take 5 mL by mouth 4 (Four) Times a Day As Needed for Cough.  Dispense: 120 mL; Refill: 0      I spent 12 minutes caring for Danny on this date of service. This time includes time spent by me in the following activities:preparing for the visit, performing a medically appropriate examination and/or evaluation ,  counseling and educating the patient/family/caregiver, ordering medications, tests, or procedures, documenting information in the medical record and care coordination     Follow Up   Return in about 1 week (around 4/2/2021), or if symptoms worsen or fail to improve, for Please schedule well exam .  Patient was given instructions and counseling regarding his condition or for health maintenance advice. Please see specific information pulled into the AVS if appropriate.     Electronically signed by Ronit Mccabe DNP, APRN, 03/26/21, 9:11 AM CDT.

## 2021-05-03 ENCOUNTER — OFFICE VISIT (OUTPATIENT)
Dept: GASTROENTEROLOGY | Age: 51
End: 2021-05-03
Payer: COMMERCIAL

## 2021-05-03 VITALS
HEART RATE: 71 BPM | OXYGEN SATURATION: 98 % | BODY MASS INDEX: 42.66 KG/M2 | SYSTOLIC BLOOD PRESSURE: 124 MMHG | HEIGHT: 72 IN | WEIGHT: 315 LBS | DIASTOLIC BLOOD PRESSURE: 72 MMHG

## 2021-05-03 DIAGNOSIS — K21.9 CHRONIC GERD: ICD-10-CM

## 2021-05-03 DIAGNOSIS — K22.719 BARRETT'S ESOPHAGUS WITH DYSPLASIA: Primary | ICD-10-CM

## 2021-05-03 PROCEDURE — 99213 OFFICE O/P EST LOW 20 MIN: CPT | Performed by: NURSE PRACTITIONER

## 2021-05-03 ASSESSMENT — ENCOUNTER SYMPTOMS
CONSTIPATION: 0
VOMITING: 0
NAUSEA: 0
TROUBLE SWALLOWING: 0
BLOOD IN STOOL: 0
COUGH: 0
ABDOMINAL PAIN: 0
DIARRHEA: 0
ABDOMINAL DISTENTION: 0
SHORTNESS OF BREATH: 0
RECTAL PAIN: 0
CHOKING: 0
ANAL BLEEDING: 0

## 2021-05-03 NOTE — PROGRESS NOTES
Subjective:     Patient ID: Jcarlos Britton is a 48 y.o. male  PCP: Melvin Barker, APRN - CNP  Referring Provider: No ref. provider found    HPI  Patient presents to the office today with the following complaints: EGD      Pt here today for 6 month EGD recall. Pt with hx Garner's esophagus, last EGD 2020 (+) long segment Garner's with dysplasia x 3. He is currently taking Nexium 20 mg po BID. Pt denies any upper GI symptoms such as abdominal pain, nausea, vomiting, dysphagia, reflux, heartburn, and melena. Assessment:     1. Garner's esophagus with dysplasia    2. Chronic GERD            Plan:   - Continue PPI BID  - Schedule EGD  Nothing to eat or drink after midnight. No driving for 24 hours after procedure. Bring a  to procedure. No aspirin, NSAIDs, fish oil 5 days before procedure. I have discussed the benefits, alternatives, and risks (including bleeding, perforation and death)  for pursuing Endoscopy (EGD/Colonscopy/EUS/ERCP) with the patient and they are willing to continue. We also discussed the need for anesthesia, IV access, proper dietary changes, medication changes if necessary, and need for bowel prep (if ordered) prior to their Endoscopic procedure. They are aware they must have someone accompany them to their scheduled procedure to drive them home - they agree to the above and are willing to continue. Orders  No orders of the defined types were placed in this encounter. Medications  No orders of the defined types were placed in this encounter.         Patient History:     Past Medical History:   Diagnosis Date    Garner's esophagus     GERD (gastroesophageal reflux disease)     Weight loss        Past Surgical History:   Procedure Laterality Date    APPENDECTOMY      COLONOSCOPY N/A 9/23/2020    Dr Jack Camacho-Diverticular disease-[facility], 5 yr recall    UPPER GASTROINTESTINAL ENDOSCOPY  8/1/2011        UPPER GASTROINTESTINAL ENDOSCOPY  8/1/2012     UPPER GASTROINTESTINAL ENDOSCOPY  10/7/10    Dr Rolland Gosselin  9-3-10    Dr Rolland Gosselin  11/24/2014    Dr Mandy Torres: long segment Madden's surveillance, biopsies neg for dysplasia    UPPER GASTROINTESTINAL ENDOSCOPY  11/2015    Long segment Madden's surveillance, biopsies neg for dysplasia    UPPER GASTROINTESTINAL ENDOSCOPY  12/21/2016    Dr Mandy Torres [facility]hiatal hernia, (+) Long seg Madden's (from 23 cm to 30 cm, not 34-36 cm), (-) dysplasia--1 yr recall and referral to [facility] for ablation therapy    UPPER GASTROINTESTINAL ENDOSCOPY  12/20/2017    Dr Tim Chowdhury [facility]-Long seg madden's (+) dysplasia (-), 1 yr recall, ablation referral also    UPPER GASTROINTESTINAL ENDOSCOPY  03/06/2019    Dr Marian Hubbard [facility]-Distal esophagitis, gastritis (+) Madden's (-) dysplasia, 3 yr recall    UPPER GASTROINTESTINAL ENDOSCOPY N/A 9/23/2020    Dr Patricio Cranker Jones(+) LSB, (+)LGD x 3 segments, 1 indeterminate for dysplasia, 4 segments w/o dysplasia, 6 month recall    VASECTOMY         Family History   Problem Relation Age of Onset    High Blood Pressure Mother     High Blood Pressure Father     High Cholesterol Father     Colon Polyps Father     Liver Cancer Maternal Grandfather     Cirrhosis Maternal Grandfather     Liver Disease Maternal Grandfather     Lung Cancer Paternal Grandfather     Colon Cancer Neg Hx     Esophageal Cancer Neg Hx     Rectal Cancer Neg Hx     Stomach Cancer Neg Hx        Social History     Socioeconomic History    Marital status:      Spouse name: None    Number of children: None    Years of education: None    Highest education level: None   Occupational History    None   Social Needs    Financial resource strain: None    Food insecurity     Worry: None     Inability: None    Transportation needs     Medical: None     Non-medical: None   Tobacco Use    Smoking status: Former Smoker Years: 1.00     Quit date: 2006     Years since quittin.6    Smokeless tobacco: Never Used   Substance and Sexual Activity    Alcohol use: Yes     Comment: occasional beer    Drug use: No    Sexual activity: None   Lifestyle    Physical activity     Days per week: None     Minutes per session: None    Stress: None   Relationships    Social connections     Talks on phone: None     Gets together: None     Attends [employer] service: None     Active member of club or organization: None     Attends meetings of clubs or organizations: None     Relationship status: None    Intimate partner violence     Fear of current or ex partner: None     Emotionally abused: None     Physically abused: None     Forced sexual activity: None   Other Topics Concern    None   Social History Narrative    None       Current Outpatient Medications   Medication Sig Dispense Refill    esomeprazole (NEXIUM) 20 MG delayed release capsule Take 20 mg by mouth 2 times daily      atorvastatin (LIPITOR) 20 MG tablet Take 20 mg by mouth daily      tiZANidine (ZANAFLEX) 4 MG capsule Take 4 mg by mouth 2 times daily       Ascorbic Acid (VITAMIN C) 500 MG CHEW Take 500 mg by mouth daily      Glucosamine 500 MG CAPS Take by mouth daily       lisinopril (PRINIVIL;ZESTRIL) 10 MG tablet Take 10 mg by mouth daily       aspirin 81 MG EC tablet Take 81 mg by mouth daily.  therapeutic multivitamin-minerals (THERAGRAN-M) tablet Take 1 tablet by mouth daily.  Psyllium (METAMUCIL PO) Take by mouth daily        No current facility-administered medications for this visit. No Known Allergies    Review of Systems   Constitutional: Negative for activity change, appetite change, fatigue, fever and unexpected weight change. HENT: Negative for trouble swallowing. Respiratory: Negative for cough, choking and shortness of breath. Cardiovascular: Negative for chest pain.    Gastrointestinal: Negative for abdominal

## 2021-07-22 RX ORDER — ESOMEPRAZOLE MAGNESIUM 40 MG/1
40 CAPSULE, DELAYED RELEASE ORAL 2 TIMES DAILY
Qty: 180 CAPSULE | Refills: 0 | Status: SHIPPED | OUTPATIENT
Start: 2021-07-22 | End: 2021-08-18 | Stop reason: ALTCHOICE

## 2021-08-18 ENCOUNTER — VIRTUAL VISIT (OUTPATIENT)
Dept: GASTROENTEROLOGY | Age: 51
End: 2021-08-18
Payer: COMMERCIAL

## 2021-08-18 DIAGNOSIS — K22.70 LONG-SEGMENT BARRETT'S ESOPHAGUS: Primary | ICD-10-CM

## 2021-08-18 PROCEDURE — 99213 OFFICE O/P EST LOW 20 MIN: CPT | Performed by: NURSE PRACTITIONER

## 2021-08-18 RX ORDER — PANTOPRAZOLE SODIUM 40 MG/1
40 TABLET, DELAYED RELEASE ORAL 2 TIMES DAILY
Qty: 180 TABLET | Refills: 3 | Status: ON HOLD | OUTPATIENT
Start: 2021-08-18 | End: 2022-06-21 | Stop reason: SDUPTHER

## 2021-08-18 ASSESSMENT — ENCOUNTER SYMPTOMS
ANAL BLEEDING: 0
CHOKING: 0
VOMITING: 0
RECTAL PAIN: 0
SHORTNESS OF BREATH: 0
NAUSEA: 0
COUGH: 0
TROUBLE SWALLOWING: 0
BLOOD IN STOOL: 0
DIARRHEA: 0
ABDOMINAL DISTENTION: 0
CONSTIPATION: 0
ABDOMINAL PAIN: 0

## 2021-08-18 NOTE — PATIENT INSTRUCTIONS

## 2021-08-18 NOTE — PROGRESS NOTES
2021    TELEHEALTH EVALUATION -- Audio/Visual (During PAJPU-60 public health emergency)    HPI:    Hannah Hatch (:  1970) has requested an audio/video evaluation for the following concern(s):  Chief Complaint   Patient presents with    Follow Up After Procedure       Pt seen today for follow up after EGD. Hx chronic GERD and long segment Garner's. Last EGD 2021 (-) dysplasia. Pt is currently on Nexium 40 mg po BID. He denies any breakthrough symptoms. He is questioning whether or not he should pursue RFA treatment for the Garner's. He is following anti-reflux diet and lifestyle modifications. Review of Systems   Constitutional: Negative for activity change, appetite change, fatigue, fever and unexpected weight change. HENT: Negative for trouble swallowing. Respiratory: Negative for cough, choking and shortness of breath. Cardiovascular: Negative for chest pain. Gastrointestinal: Negative for abdominal distention, abdominal pain, anal bleeding, blood in stool, constipation, diarrhea, nausea, rectal pain and vomiting. Allergic/Immunologic: Negative for food allergies. All other systems reviewed and are negative. Prior to Visit Medications    Medication Sig Taking? Authorizing Provider   atorvastatin (LIPITOR) 20 MG tablet Take 20 mg by mouth daily  Historical Provider, MD   tiZANidine (ZANAFLEX) 4 MG capsule Take 4 mg by mouth 2 times daily   Historical Provider, MD   Ascorbic Acid (VITAMIN C) 500 MG CHEW Take 500 mg by mouth daily  Historical Provider, MD   Glucosamine 500 MG CAPS Take by mouth daily   Historical Provider, MD   lisinopril (PRINIVIL;ZESTRIL) 10 MG tablet Take 10 mg by mouth daily   Historical Provider, MD   aspirin 81 MG EC tablet Take 81 mg by mouth daily. Historical Provider, MD   therapeutic multivitamin-minerals (THERAGRAN-M) tablet Take 1 tablet by mouth daily.   Historical Provider, MD   Psyllium (METAMUCIL PO) Take by mouth daily Historical Provider, MD       Social History     Tobacco Use    Smoking status: Former Smoker     Years: 1.00     Quit date: 2006     Years since quittin.9    Smokeless tobacco: Never Used   Vaping Use    Vaping Use: Never used   Substance Use Topics    Alcohol use: Yes     Comment: occasional beer    Drug use: No        No Known Allergies,   Past Medical History:   Diagnosis Date    Madden's esophagus     GERD (gastroesophageal reflux disease)     Weight loss    ,   Past Surgical History:   Procedure Laterality Date    APPENDECTOMY      COLONOSCOPY N/A 2020    [facility] yr recall    UPPER GASTROINTESTINAL ENDOSCOPY  2011        UPPER GASTROINTESTINAL ENDOSCOPY  2012        UPPER GASTROINTESTINAL ENDOSCOPY  10/07/2010    Dr Jesús Maldonado ENDOSCOPY  2010    Dr Jesús Maldonado ENDOSCOPY  2014    Dr Asha Cook: long segment Madden's surveillance, biopsies neg for dysplasia    UPPER GASTROINTESTINAL ENDOSCOPY  2015    Long segment Madden's surveillance, biopsies neg for dysplasia    UPPER GASTROINTESTINAL ENDOSCOPY  2016    Dr Asha Cook [facility]hiatal hernia, (+) Long seg Madden's (from 23 cm to 30 cm, not 34-36 cm), (-) dysplasia--1 yr recall and referral to [facility] for ablation therapy    UPPER GASTROINTESTINAL ENDOSCOPY  2017    Dr Vinay Edmonds [facility]-Long seg madden's (+) dysplasia (-), 1 yr recall, ablation referral also    UPPER GASTROINTESTINAL ENDOSCOPY  2019    Dr Chad Lima [facility]-Distal esophagitis, gastritis (+) Madden's (-) dysplasia, 3 yr recall    UPPER GASTROINTESTINAL ENDOSCOPY N/A 2020    Dr Phani Camacho(+) LSB, (+)LGD x 3 segments, 1 indeterminate for dysplasia, 4 segments w/o dysplasia, 6 month recall    UPPER GASTROINTESTINAL ENDOSCOPY  2021    Dr Chad Anna (+) LSB, (-) dysplasia, 3 yr recall   [facility] VASECTOMY     ,   Social History     Tobacco Use    Smoking status: Former Smoker     Years: 1.00     Quit date: 2006     Years since quittin.9    Smokeless tobacco: Never Used   Vaping Use    Vaping Use: Never used   Substance Use Topics    Alcohol use: Yes     Comment: occasional beer    Drug use: No   ,   Family History   Problem Relation Age of Onset    High Blood Pressure Mother     High Blood Pressure Father     High Cholesterol Father     Colon Polyps Father     Liver Cancer Maternal Grandfather     Cirrhosis Maternal Grandfather     Liver Disease Maternal Grandfather     Lung Cancer Paternal Grandfather     Colon Cancer Neg Hx     Esophageal Cancer Neg Hx     Rectal Cancer Neg Hx     Stomach Cancer Neg Hx        PHYSICAL EXAMINATION:  [ INSTRUCTIONS:  \"[x]\" Indicates a positive item  \"[]\" Indicates a negative item  -- DELETE ALL ITEMS NOT EXAMINED]  Vital Signs: (As obtained by patient/caregiver or practitioner observation)    Blood pressure-  Heart rate-    Respiratory rate-    Temperature-  Pulse oximetry-     Constitutional: [] Appears well-developed and well-nourished [] No apparent distress      [] Abnormal-   Mental status  [x] Alert and awake  [x] Oriented to person/place/time [x]Able to follow commands      Eyes:  EOM    [x]  Normal  [] Abnormal-  Sclera  [x]  Normal  [] Abnormal -         Discharge [x]  None visible  [] Abnormal -    HENT:   [x] Normocephalic, atraumatic.   [] Abnormal   [x] Mouth/Throat: Mucous membranes are moist.     External Ears [x] Normal  [] Abnormal-     Neck: [x] No visualized mass     Pulmonary/Chest: [x] Respiratory effort normal.  [x] No visualized signs of difficulty breathing or respiratory distress        [] Abnormal-      Musculoskeletal:   [x] Normal gait with no signs of ataxia         [x] Normal range of motion of neck        [] Abnormal-       Neurological:        [x] No Facial Asymmetry (Cranial nerve 7 motor function) (limited exam to video visit)          [x] No gaze palsy        [] Abnormal-         Skin:        [x] No significant exanthematous lesions or discoloration noted on facial skin         [] Abnormal-            Psychiatric:       [x] Normal Affect [x] No Hallucinations        [] Abnormal-     Other pertinent observable physical exam findings-     ASSESSMENT/PLAN:  1. Long-segment Garner's esophagus      - Switch PPI to Protonix 40 mg po BID  - Consider repeat EGD in 6 months after treatment adjustment  - Plan to discuss with Dr. Claire Chatterjee regarding any further recommendations. - Follow up in 6 months or sooner if needed  - Strict anti-reflux precautions    Return in about 6 months (around 2/18/2022). Leoncio Willis, was evaluated through a synchronous (real-time) audio-video encounter. The patient (or guardian if applicable) is aware that this is a billable service. Verbal consent to proceed has been obtained within the past 12 months. The visit was conducted pursuant to the emergency declaration under the [address] [place] [address] authority and the [employer] and [employer] General Act. Patient identification was verified, and a caregiver was present when appropriate. The patient was located in a state where the provider was credentialed to provide care. Total time spent on this encounter: Not billed by time    --SHEFALI Luna NP on 8/18/2021 at 10:16 AM    An electronic signature was used to authenticate this note.

## 2021-09-05 DIAGNOSIS — E78.2 MIXED HYPERLIPIDEMIA: ICD-10-CM

## 2021-09-05 DIAGNOSIS — I10 ESSENTIAL HYPERTENSION: ICD-10-CM

## 2021-09-07 NOTE — TELEPHONE ENCOUNTER
LAST REFILL 03/09/2021 #[id number]     LAST OFFICE VISIT 03/09/2021    NEXT OFFICE VISIT 09/13/2021

## 2021-09-08 RX ORDER — ATORVASTATIN CALCIUM 20 MG/1
TABLET, FILM COATED ORAL
Qty: 90 TABLET | Refills: 3 | Status: SHIPPED | OUTPATIENT
Start: 2021-09-08 | End: 2022-03-16 | Stop reason: SDUPTHER

## 2021-09-08 RX ORDER — LISINOPRIL 20 MG/1
TABLET ORAL
Qty: 30 TABLET | Refills: 0 | Status: SHIPPED | OUTPATIENT
Start: 2021-09-08 | End: 2021-09-17 | Stop reason: SDUPTHER

## 2021-09-15 ENCOUNTER — TELEPHONE (OUTPATIENT)
Dept: FAMILY MEDICINE CLINIC | Facility: CLINIC | Age: 51
End: 2021-09-15

## 2021-09-15 DIAGNOSIS — M25.511 ACUTE PAIN OF RIGHT SHOULDER: Primary | ICD-10-CM

## 2021-09-15 RX ORDER — TIZANIDINE 4 MG/1
4 TABLET ORAL EVERY 8 HOURS PRN
Qty: 90 TABLET | Refills: 0 | Status: SHIPPED | OUTPATIENT
Start: 2021-09-15 | End: 2021-09-17 | Stop reason: SDUPTHER

## 2021-09-15 NOTE — TELEPHONE ENCOUNTER
Received noticed from [facility].  Tizanidine HCL CAPS is no longer covered by patients insurance.      Covered alternatives include: Tizanidine HCL TABS, Cyclobenzaprine HCL tabs, Baclofen tabs.    They just need a new script.

## 2021-09-17 ENCOUNTER — OFFICE VISIT (OUTPATIENT)
Dept: FAMILY MEDICINE CLINIC | Facility: CLINIC | Age: 51
End: 2021-09-17

## 2021-09-17 VITALS
BODY MASS INDEX: 42.66 KG/M2 | HEART RATE: 66 BPM | RESPIRATION RATE: 16 BRPM | DIASTOLIC BLOOD PRESSURE: 82 MMHG | TEMPERATURE: 97.5 F | WEIGHT: 315 LBS | HEIGHT: 72 IN | OXYGEN SATURATION: 99 % | SYSTOLIC BLOOD PRESSURE: 118 MMHG

## 2021-09-17 DIAGNOSIS — G47.33 OBSTRUCTIVE SLEEP APNEA SYNDROME: ICD-10-CM

## 2021-09-17 DIAGNOSIS — M25.511 ACUTE PAIN OF RIGHT SHOULDER: ICD-10-CM

## 2021-09-17 DIAGNOSIS — K22.70 BARRETT'S ESOPHAGUS WITHOUT DYSPLASIA: ICD-10-CM

## 2021-09-17 DIAGNOSIS — E66.01 MORBID (SEVERE) OBESITY DUE TO EXCESS CALORIES (HCC): ICD-10-CM

## 2021-09-17 DIAGNOSIS — I10 ESSENTIAL HYPERTENSION: Primary | ICD-10-CM

## 2021-09-17 PROCEDURE — 99214 OFFICE O/P EST MOD 30 MIN: CPT | Performed by: NURSE PRACTITIONER

## 2021-09-17 RX ORDER — PANTOPRAZOLE SODIUM 40 MG/1
40 TABLET, DELAYED RELEASE ORAL DAILY
Qty: 90 TABLET | Refills: 1 | Status: SHIPPED | OUTPATIENT
Start: 2021-09-17 | End: 2022-03-16 | Stop reason: SDUPTHER

## 2021-09-17 RX ORDER — TIZANIDINE 4 MG/1
4 TABLET ORAL EVERY 8 HOURS PRN
Qty: 90 TABLET | Refills: 0 | Status: SHIPPED | OUTPATIENT
Start: 2021-09-17 | End: 2021-12-03 | Stop reason: SDUPTHER

## 2021-09-17 RX ORDER — PANTOPRAZOLE SODIUM 40 MG/1
40 TABLET, DELAYED RELEASE ORAL DAILY
COMMUNITY
Start: 2021-08-18 | End: 2021-09-17 | Stop reason: SDUPTHER

## 2021-09-17 RX ORDER — TIZANIDINE HYDROCHLORIDE 4 MG/1
CAPSULE, GELATIN COATED ORAL
Qty: 270 CAPSULE | Refills: 3 | OUTPATIENT
Start: 2021-09-17

## 2021-09-17 RX ORDER — LISINOPRIL 20 MG/1
20 TABLET ORAL DAILY
Qty: 30 TABLET | Refills: 0 | Status: SHIPPED | OUTPATIENT
Start: 2021-09-17 | End: 2021-11-01

## 2021-09-17 NOTE — PROGRESS NOTES
Chief Complaint  Hypertension (Follow up)    Subjective          Danny Vega presents to [facility]  HTN, Barretts esophagus, GERD, mixed hyperlipidemia, ED, OMERO on CPAP and morbidly obese. Denies any chest pain, shortness of breath or palpitations, denies any difficulty with CPAP, uses 6-8 hours a night.  He has been vaccinated with [employer]  Says he thinks he had covid back in January not sure.  Denies any problems at present.      Hypertension  This is a chronic problem. The current episode started more than 1 year ago. The problem has been gradually improving since onset. Pertinent negatives include no anxiety, chest pain, malaise/fatigue, peripheral edema, shortness of breath or sweats. There are no associated agents to hypertension. Risk factors for coronary artery disease include dyslipidemia, family history, male gender and obesity. Past treatments include ACE inhibitors. Current antihypertension treatment includes ACE inhibitors. The current treatment provides mild improvement. There are no compliance problems.  There is no history of kidney disease, CVA or PVD.   Heartburn  He complains of heartburn. He reports no chest pain or no stridor. This is a chronic problem. The current episode started more than 1 year ago. The problem occurs occasionally. The heartburn does not wake him from sleep. The heartburn does not limit his activity. The heartburn doesn't change with position. The symptoms are aggravated by certain foods. Risk factors include Somers's esophagus. He has tried a PPI and an antacid for the symptoms. The treatment provided no relief.   Hyperlipidemia  This is a chronic problem. The current episode started more than 1 year ago. The problem is controlled. Recent lipid tests were reviewed and are normal. Exacerbating diseases include obesity. He has no history of diabetes or hypothyroidism. There are no known factors aggravating his hyperlipidemia.  Pertinent negatives include no chest pain or shortness of breath. Current antihyperlipidemic treatment includes statins. The current treatment provides moderate improvement of lipids. There are no compliance problems.  Risk factors for coronary artery disease include hypertension, dyslipidemia, male sex and obesity.   Obesity  This is a chronic problem. The current episode started more than 1 year ago. The problem occurs constantly. The problem has been gradually worsening. Pertinent negatives include no chest pain or chills. Nothing aggravates the symptoms. He has tried nothing for the symptoms. The treatment provided mild relief.   Erectile Dysfunction  This is a chronic problem. The current episode started more than 1 year ago. The nature of his difficulty is maintaining erection. Non-physiologic factors contributing to erectile dysfunction are a decreased libido. Irritative symptoms do not include frequency, nocturia or urgency. Obstructive symptoms do not include dribbling, an intermittent stream, straining or a weak stream. Pertinent negatives include no chills, dysuria, hesitancy or inability to urinate. Nothing aggravates the symptoms. Past treatments include sildenafil. The treatment provided moderate relief. He has been using treatment for 1 to 2 years. He has had no adverse reactions caused by medications. There are no known risk factors.     The following portions of the patient's history were reviewed and updated as appropriate: allergies, current medications, past family history, past medical history, past social history, past surgical history and problem list.    Chronic problems: htn controlled with lisinopril, Barretts esophagus stable with protonix, ED stable with viagra, hyperlipidemia stable with atorvastatin, chronic knee pain stable with glucosamine, Sleep apnea stable with CPAP.     Objective   Vital Signs:   /82 (BP Location: Right arm, Patient Position: Sitting, Cuff Size: Adult)    "Pulse 66   Temp 97.5 °F (36.4 °C) (Infrared)   Resp 16   Ht 182.9 cm (72\") Comment: Per patient  Wt (!) 151 kg (331 lb 12.8 oz)   SpO2 99%   BMI 45.00 kg/m²     Physical Exam  Vitals and nursing note reviewed.   Constitutional:       General: He is awake.      Appearance: Normal appearance. He is well-developed and well-groomed. He is morbidly obese.   HENT:      Head: Normocephalic and atraumatic.      Right Ear: Hearing, tympanic membrane, ear canal and external ear normal.      Left Ear: Hearing, tympanic membrane, ear canal and external ear normal.      Nose: Nose normal.      Mouth/Throat:      Lips: Pink.      Pharynx: Oropharynx is clear.   Cardiovascular:      Rate and Rhythm: Normal rate and regular rhythm.      Heart sounds: Normal heart sounds.   Pulmonary:      Effort: Pulmonary effort is normal.      Breath sounds: Normal breath sounds and air entry.   Abdominal:      General: Abdomen is flat. Bowel sounds are normal.      Palpations: Abdomen is soft.   Musculoskeletal:      Right lower leg: No edema.      Left lower leg: No edema.   Lymphadenopathy:      Head:      Right side of head: No submental, submandibular or tonsillar adenopathy.      Left side of head: No submental, submandibular or tonsillar adenopathy.   Skin:     General: Skin is warm and dry.   Neurological:      Mental Status: He is alert and oriented to person, place, and time.      Sensory: Sensation is intact.      Motor: Motor function is intact.      Coordination: Coordination is intact.      Gait: Gait is intact.   Psychiatric:         Attention and Perception: Attention normal.         Mood and Affect: Mood normal.         Speech: Speech normal.         Behavior: Behavior is cooperative.         Thought Content: Thought content normal.         Cognition and Memory: Cognition normal.         Judgment: Judgment normal.        Result Review :                 Assessment and Plan    Diagnoses and all orders for this visit:    1. " Essential hypertension (Primary)  -     lisinopril (PRINIVIL,ZESTRIL) 20 MG tablet; Take 1 tablet by mouth Daily.  Dispense: 30 tablet; Refill: 0  -     CBC (No Diff)  -     Comprehensive metabolic panel  -     Lipid panel    2. Somers's esophagus without dysplasia  -     pantoprazole (PROTONIX) 40 MG EC tablet; Take 1 tablet by mouth Daily.  Dispense: 90 tablet; Refill: 1    3. Obstructive sleep apnea syndrome       -   Continue wearing cpap    4. Acute pain of right shoulder  -     tiZANidine (ZANAFLEX) 4 MG tablet; Take 1 tablet by mouth Every 8 (Eight) Hours As Needed for Muscle Spasms.  Dispense: 90 tablet; Refill: 0    5. Morbid (severe) obesity due to excess calories (CMS/HCC)    Patient's (Body mass index is 45 kg/m².) indicates that they are morbidly obese (BMI > 40 or > 35 with obesity - related health condition) with health related conditions that include obstructive sleep apnea, hypertension, dyslipidemias and GERD . Weight is worsening. BMI is is above average; BMI management plan is completed. We discussed portion control and increasing exercise.     I spent 14 minutes caring for Danny on this date of service. This time includes time spent by me in the following activities:preparing for the visit, obtaining and/or reviewing a separately obtained history, performing a medically appropriate examination and/or evaluation , ordering medications, tests, or procedures, documenting information in the medical record and care coordination     Follow Up     Return in about 6 months (around 3/17/2022) for Recheck.     Patient was given instructions and counseling regarding his condition or for health maintenance advice. Please see specific information pulled into the AVS if appropriate.       Answers for HPI/ROS submitted by the patient on 9/16/2021  What is the primary reason for your visit?: Other  Please describe your symptoms.: Medicine refill approval  Have you had these symptoms before?: No  How long  have you been having these symptoms?: Greater than 2 weeks    Electronically signed by Ronit Mccabe DNP, APRN, 09/17/21, 8:39 AM CDT.

## 2021-09-18 LAB
ALBUMIN SERPL-MCNC: 4.6 G/DL (ref 3.5–5.2)
ALBUMIN/GLOB SERPL: 1.8 G/DL
ALP SERPL-CCNC: 64 U/L (ref 39–117)
ALT SERPL-CCNC: 35 U/L (ref 1–41)
AST SERPL-CCNC: 30 U/L (ref 1–40)
BILIRUB SERPL-MCNC: 0.4 MG/DL (ref 0–1.2)
BUN SERPL-MCNC: 17 MG/DL (ref 6–20)
BUN/CREAT SERPL: 18.9 (ref 7–25)
CALCIUM SERPL-MCNC: 9.6 MG/DL (ref 8.6–10.5)
CHLORIDE SERPL-SCNC: 101 MMOL/L (ref 98–107)
CHOLEST SERPL-MCNC: 131 MG/DL (ref 0–200)
CO2 SERPL-SCNC: 23.8 MMOL/L (ref 22–29)
CREAT SERPL-MCNC: 0.9 MG/DL (ref 0.76–1.27)
ERYTHROCYTE [DISTWIDTH] IN BLOOD BY AUTOMATED COUNT: 13.8 % (ref 12.3–15.4)
GLOBULIN SER CALC-MCNC: 2.5 GM/DL
GLUCOSE SERPL-MCNC: 114 MG/DL (ref 65–99)
HCT VFR BLD AUTO: 43.3 % (ref 37.5–51)
HDLC SERPL-MCNC: 34 MG/DL (ref 40–60)
HGB BLD-MCNC: 14.4 G/DL (ref 13–17.7)
LDLC SERPL CALC-MCNC: 70 MG/DL (ref 0–100)
MCH RBC QN AUTO: 29.9 PG (ref 26.6–33)
MCHC RBC AUTO-ENTMCNC: 33.3 G/DL (ref 31.5–35.7)
MCV RBC AUTO: 89.8 FL (ref 79–97)
PLATELET # BLD AUTO: 245 10*3/MM3 (ref 140–450)
POTASSIUM SERPL-SCNC: 4.2 MMOL/L (ref 3.5–5.2)
PROT SERPL-MCNC: 7.1 G/DL (ref 6–8.5)
RBC # BLD AUTO: 4.82 10*6/MM3 (ref 4.14–5.8)
SODIUM SERPL-SCNC: 136 MMOL/L (ref 136–145)
TRIGL SERPL-MCNC: 157 MG/DL (ref 0–150)
VLDLC SERPL CALC-MCNC: 27 MG/DL (ref 5–40)
WBC # BLD AUTO: 7.49 10*3/MM3 (ref 3.4–10.8)

## 2021-10-04 ENCOUNTER — TELEPHONE (OUTPATIENT)
Dept: FAMILY MEDICINE CLINIC | Facility: CLINIC | Age: 51
End: 2021-10-04

## 2021-10-04 NOTE — TELEPHONE ENCOUNTER
PATIENT CALLED IN STATING HE NEEDS A PRIOR AUTHORIZATION ON THE     tiZANidine (ZANAFLEX) 4 MG tablet      PLEASE CALL     1 [phone]    GOOD CALL BACK   [phone]

## 2021-10-05 NOTE — TELEPHONE ENCOUNTER
Called [facility]- spoke with Edda. States that script doesn't need PA, has been filled and ready for .     Called pt to notify that script is ready for  at pharmacy. Pt states that he is out of medication and will  at [facility] this time, but next script in 3 months [facility]

## 2021-10-31 DIAGNOSIS — I10 ESSENTIAL HYPERTENSION: ICD-10-CM

## 2021-11-01 RX ORDER — LISINOPRIL 20 MG/1
TABLET ORAL
Qty: 30 TABLET | Refills: 11 | Status: SHIPPED | OUTPATIENT
Start: 2021-11-01 | End: 2022-03-16 | Stop reason: SDUPTHER

## 2021-11-01 NOTE — TELEPHONE ENCOUNTER
Rx Refill Note  Requested Prescriptions     Pending Prescriptions Disp Refills   • lisinopril (PRINIVIL,ZESTRIL) 20 MG tablet [Pharmacy Med Name: LISINOPRIL TABS 20MG] 30 tablet 11     Sig: TAKE 1 TABLET DAILY      Last office visit with prescribing clinician: 9/17/2021      Next office visit with prescribing clinician: 3/18/2022   Last refill: 9/17/2021    Jennie Solano MA  11/01/21, 13:55 CDT

## 2021-12-03 ENCOUNTER — OFFICE VISIT (OUTPATIENT)
Dept: FAMILY MEDICINE CLINIC | Facility: CLINIC | Age: 51
End: 2021-12-03

## 2021-12-03 VITALS
SYSTOLIC BLOOD PRESSURE: 118 MMHG | BODY MASS INDEX: 42.66 KG/M2 | OXYGEN SATURATION: 98 % | WEIGHT: 315 LBS | HEIGHT: 72 IN | DIASTOLIC BLOOD PRESSURE: 82 MMHG | RESPIRATION RATE: 18 BRPM | HEART RATE: 93 BPM | TEMPERATURE: 98.6 F

## 2021-12-03 DIAGNOSIS — J40 BRONCHITIS: ICD-10-CM

## 2021-12-03 DIAGNOSIS — M25.511 ACUTE PAIN OF RIGHT SHOULDER: ICD-10-CM

## 2021-12-03 DIAGNOSIS — J01.00 ACUTE NON-RECURRENT MAXILLARY SINUSITIS: Primary | ICD-10-CM

## 2021-12-03 PROCEDURE — 99213 OFFICE O/P EST LOW 20 MIN: CPT | Performed by: NURSE PRACTITIONER

## 2021-12-03 PROCEDURE — 96372 THER/PROPH/DIAG INJ SC/IM: CPT | Performed by: NURSE PRACTITIONER

## 2021-12-03 RX ORDER — CEFDINIR 300 MG/1
300 CAPSULE ORAL 2 TIMES DAILY
Qty: 14 CAPSULE | Refills: 0 | Status: SHIPPED | OUTPATIENT
Start: 2021-12-03 | End: 2021-12-10

## 2021-12-03 RX ORDER — DEXAMETHASONE SODIUM PHOSPHATE 10 MG/ML
10 INJECTION INTRAMUSCULAR; INTRAVENOUS ONCE
Status: COMPLETED | OUTPATIENT
Start: 2021-12-03 | End: 2021-12-03

## 2021-12-03 RX ORDER — TIZANIDINE 4 MG/1
4 TABLET ORAL EVERY 8 HOURS PRN
Qty: 90 TABLET | Refills: 0 | Status: SHIPPED | OUTPATIENT
Start: 2021-12-03 | End: 2022-01-18 | Stop reason: SDUPTHER

## 2021-12-03 RX ORDER — DEXTROMETHORPHAN HYDROBROMIDE AND PROMETHAZINE HYDROCHLORIDE 15; 6.25 MG/5ML; MG/5ML
5 SYRUP ORAL 4 TIMES DAILY PRN
Qty: 120 ML | Refills: 0 | Status: SHIPPED | OUTPATIENT
Start: 2021-12-03 | End: 2023-03-15

## 2021-12-03 RX ADMIN — DEXAMETHASONE SODIUM PHOSPHATE 10 MG: 10 INJECTION INTRAMUSCULAR; INTRAVENOUS at 10:30

## 2021-12-03 NOTE — PROGRESS NOTES
Chief Complaint  STEPHANIE Vega presents to [facility]  Sick for more than a week with cough, congestion, sinus pain and pressure.  Taking tylenol sinus, with improvement.  Denies fever, chills, nausea, vomiting or diarrhea, denies loss of taste and smell.  Right shoulder continues to hurt sees Dr. Briones.   Refuses the covid test    Sinusitis  This is a new problem. The current episode started in the past 7 days. The problem has been gradually worsening since onset. There has been no fever. The fever has been present for less than 1 day. His pain is at a severity of 6/10. The pain is moderate. Associated symptoms include congestion, coughing, a hoarse voice and sinus pressure. Pertinent negatives include no chills, ear pain, shortness of breath or sore throat. Past treatments include oral decongestants. The treatment provided mild relief.   Cough  This is a new problem. The current episode started in the past 7 days. The problem has been unchanged. The problem occurs every few minutes. The cough is non-productive. Associated symptoms include nasal congestion. Pertinent negatives include no chills, ear congestion, ear pain, sore throat or shortness of breath. Nothing aggravates the symptoms. He has tried rest for the symptoms. The treatment provided mild relief. His past medical history is significant for bronchitis.   Shoulder Injury   The incident occurred at home. The right shoulder is affected. The incident occurred more than 1 week ago. The injury mechanism is unknown. The quality of the pain is described as aching and burning. The pain does not radiate. The pain is at a severity of 6/10. The pain is moderate. Associated symptoms include muscle weakness. The symptoms are aggravated by movement. He has tried rest and NSAIDs for the symptoms. The treatment provided mild relief.     The following portions of the patient's history were reviewed and  "updated as appropriate: allergies, current medications, past family history, past medical history, past social history, past surgical history and problem list.    Objective   Vital Signs:   /82 (BP Location: Left arm, Patient Position: Sitting, Cuff Size: Adult)   Pulse 93   Temp 98.6 °F (37 °C) (Infrared)   Resp 18   Ht 182.9 cm (72\")   Wt (!) 150 kg (331 lb)   SpO2 98%   BMI 44.89 kg/m²     Physical Exam  Vitals and nursing note reviewed.   Constitutional:       General: He is awake.      Appearance: Normal appearance. He is well-developed and well-groomed. He is morbidly obese.   HENT:      Head: Normocephalic and atraumatic.      Right Ear: Hearing, tympanic membrane, ear canal and external ear normal.      Left Ear: Hearing, tympanic membrane, ear canal and external ear normal.      Nose: Congestion present.      Right Sinus: Maxillary sinus tenderness present.      Left Sinus: Maxillary sinus tenderness present.      Mouth/Throat:      Lips: Pink.      Pharynx: Oropharynx is clear.   Eyes:      General: Lids are normal.      Conjunctiva/sclera: Conjunctivae normal.   Cardiovascular:      Rate and Rhythm: Normal rate and regular rhythm.      Heart sounds: Normal heart sounds.   Pulmonary:      Effort: Pulmonary effort is normal.      Breath sounds: Normal breath sounds and air entry.   Musculoskeletal:      Cervical back: Full passive range of motion without pain.      Right lower leg: No edema.      Left lower leg: No edema.   Lymphadenopathy:      Head:      Right side of head: No submental, submandibular or tonsillar adenopathy.      Left side of head: No submental, submandibular or tonsillar adenopathy.   Skin:     General: Skin is warm and dry.   Neurological:      Mental Status: He is alert.      Sensory: Sensation is intact.      Motor: Motor function is intact.      Coordination: Coordination is intact.      Gait: Gait is intact.   Psychiatric:         Attention and Perception: Attention " and perception normal.         Mood and Affect: Mood and affect normal.         Speech: Speech normal.         Behavior: Behavior normal. Behavior is cooperative.         Thought Content: Thought content normal.         Cognition and Memory: Cognition and memory normal.         Judgment: Judgment normal.        Result Review :                 Assessment and Plan    Diagnoses and all orders for this visit:    1. Acute non-recurrent maxillary sinusitis (Primary)  -     cefdinir (OMNICEF) 300 MG capsule; Take 1 capsule by mouth 2 (Two) Times a Day for 7 days.  Dispense: 14 capsule; Refill: 0  -     dexamethasone (DECADRON) injection 10 mg    2. Bronchitis  -     promethazine-dextromethorphan (PROMETHAZINE-DM) 6.25-15 MG/5ML syrup; Take 5 mL by mouth 4 (Four) Times a Day As Needed for Cough.  Dispense: 120 mL; Refill: 0    3. Acute pain of right shoulder  -     tiZANidine (ZANAFLEX) 4 MG tablet; Take 1 tablet by mouth Every 8 (Eight) Hours As Needed for Muscle Spasms.  Dispense: 90 tablet; Refill: 0      I spent 14 minutes caring for Danny on this date of service. This time includes time spent by me in the following activities:preparing for the visit, performing a medically appropriate examination and/or evaluation , counseling and educating the patient/family/caregiver, ordering medications, tests, or procedures, documenting information in the medical record and care coordination     Follow Up {Instructions Charge Capture  Follow-up Communications :23}    Return in about 1 week (around 12/10/2021), or if symptoms worsen or fail to improve.     Patient was given instructions and counseling regarding his condition or for health maintenance advice. Please see specific information pulled into the AVS if appropriate.     Electronically signed by Ronit Mccabe, YURIY, APRN, 12/03/21, 10:52 AM CST.

## 2021-12-03 NOTE — PATIENT INSTRUCTIONS
Sinusitis, Adult  Sinusitis is inflammation of your sinuses. Sinuses are hollow spaces in the bones around your face. Your sinuses are located:  · Around your eyes.  · In the middle of your forehead.  · Behind your nose.  · In your cheekbones.  Mucus normally drains out of your sinuses. When your nasal tissues become inflamed or swollen, mucus can become trapped or blocked. This allows bacteria, viruses, and fungi to grow, which leads to infection. Most infections of the sinuses are caused by a virus.  Sinusitis can develop quickly. It can last for up to 4 weeks (acute) or for more than 12 weeks (chronic). Sinusitis often develops after a cold.  What are the causes?  This condition is caused by anything that creates swelling in the sinuses or stops mucus from draining. This includes:  · Allergies.  · Asthma.  · Infection from bacteria or viruses.  · Deformities or blockages in your nose or sinuses.  · Abnormal growths in the nose (nasal polyps).  · Pollutants, such as chemicals or irritants in the air.  · Infection from fungi (rare).  What increases the risk?  You are more likely to develop this condition if you:  · Have a weak body defense system (immune system).  · Do a lot of swimming or diving.  · Overuse nasal sprays.  · Smoke.  What are the signs or symptoms?  The main symptoms of this condition are pain and a feeling of pressure around the affected sinuses. Other symptoms include:  · Stuffy nose or congestion.  · Thick drainage from your nose.  · Swelling and warmth over the affected sinuses.  · Headache.  · Upper toothache.  · A cough that may get worse at night.  · Extra mucus that collects in the throat or the back of the nose (postnasal drip).  · Decreased sense of smell and taste.  · Fatigue.  · A fever.  · Sore throat.  · Bad breath.  How is this diagnosed?  This condition is diagnosed based on:  · Your symptoms.  · Your medical history.  · A physical exam.  · Tests to find out if your condition is  acute or chronic. This may include:  ? Checking your nose for nasal polyps.  ? Viewing your sinuses using a device that has a light (endoscope).  ? Testing for allergies or bacteria.  ? Imaging tests, such as an MRI or CT scan.  In rare cases, a bone biopsy may be done to rule out more serious types of fungal sinus disease.  How is this treated?  Treatment for sinusitis depends on the cause and whether your condition is chronic or acute.  · If caused by a virus, your symptoms should go away on their own within 10 days. You may be given medicines to relieve symptoms. They include:  ? Medicines that shrink swollen nasal passages (topical intranasal decongestants).  ? Medicines that treat allergies (antihistamines).  ? A spray that eases inflammation of the nostrils (topical intranasal corticosteroids).  ? Rinses that help get rid of thick mucus in your nose (nasal saline washes).  · If caused by bacteria, your health care provider may recommend waiting to see if your symptoms improve. Most bacterial infections will get better without antibiotic medicine. You may be given antibiotics if you have:  ? A severe infection.  ? A weak immune system.  · If caused by narrow nasal passages or nasal polyps, you may need to have surgery.  Follow these instructions at home:  Medicines  · Take, use, or apply over-the-counter and prescription medicines only as told by your health care provider. These may include nasal sprays.  · If you were prescribed an antibiotic medicine, take it as told by your health care provider. Do not stop taking the antibiotic even if you start to feel better.  Hydrate and humidify    · Drink enough fluid to keep your urine pale yellow. Staying hydrated will help to thin your mucus.  · Use a cool mist humidifier to keep the humidity level in your home above 50%.  · Inhale steam for 10-15 minutes, 3-4 times a day, or as told by your health care provider. You can do this in the bathroom while a hot shower is  running.  · Limit your exposure to cool or dry air.    Rest  · Rest as much as possible.  · Sleep with your head raised (elevated).  · Make sure you get enough sleep each night.  General instructions    · Apply a warm, moist washcloth to your face 3-4 times a day or as told by your health care provider. This will help with discomfort.  · Wash your hands often with soap and water to reduce your exposure to germs. If soap and water are not available, use hand .  · Do not smoke. Avoid being around people who are smoking (secondhand smoke).  · Keep all follow-up visits as told by your health care provider. This is important.    Contact a health care provider if:  · You have a fever.  · Your symptoms get worse.  · Your symptoms do not improve within 10 days.  Get help right away if:  · You have a severe headache.  · You have persistent vomiting.  · You have severe pain or swelling around your face or eyes.  · You have vision problems.  · You develop confusion.  · Your neck is stiff.  · You have trouble breathing.  Summary  · Sinusitis is soreness and inflammation of your sinuses. Sinuses are hollow spaces in the bones around your face.  · This condition is caused by nasal tissues that become inflamed or swollen. The swelling traps or blocks the flow of mucus. This allows bacteria, viruses, and fungi to grow, which leads to infection.  · If you were prescribed an antibiotic medicine, take it as told by your health care provider. Do not stop taking the antibiotic even if you start to feel better.  · Keep all follow-up visits as told by your health care provider. This is important.  This information is not intended to replace advice given to you by your health care provider. Make sure you discuss any questions you have with your health care provider.  Document Revised: 05/20/2019 Document Reviewed: 05/20/2019  [employer] Patient Education © 2021 [employer]

## 2022-01-18 ENCOUNTER — PATIENT MESSAGE (OUTPATIENT)
Dept: FAMILY MEDICINE CLINIC | Facility: CLINIC | Age: 52
End: 2022-01-18

## 2022-01-18 DIAGNOSIS — M25.511 ACUTE PAIN OF RIGHT SHOULDER: ICD-10-CM

## 2022-01-18 RX ORDER — TIZANIDINE 4 MG/1
4 TABLET ORAL EVERY 8 HOURS PRN
Qty: 90 TABLET | Refills: 0 | Status: SHIPPED | OUTPATIENT
Start: 2022-01-18 | End: 2022-03-11

## 2022-01-18 NOTE — TELEPHONE ENCOUNTER
From: Danny Vega  To: Ronit Mccabe, DNP, APRN  Sent: 1/18/2022 9:59 AM CST  Subject: [facility].    Ronit soto is needing a new prescription for my muscle relaxer tizanadin to put it on home delivery. Could you please take care of this for me thanks Danny

## 2022-01-18 NOTE — TELEPHONE ENCOUNTER
Please see message from patient.     Rx Refill Note  Requested Prescriptions     Pending Prescriptions Disp Refills   • tiZANidine (ZANAFLEX) 4 MG tablet 90 tablet 0     Sig: Take 1 tablet by mouth Every 8 (Eight) Hours As Needed for Muscle Spasms.      Last office visit with prescribing clinician: 12/3/2021      Next office visit with prescribing clinician: 3/18/2022  Last RX:  12.03.2021  Larry Shafer MA  01/18/22, 15:45 CST

## 2022-02-17 RX ORDER — ATORVASTATIN CALCIUM 20 MG/1
20 TABLET, FILM COATED ORAL DAILY
COMMUNITY
Start: 2021-03-09

## 2022-02-18 ENCOUNTER — TELEMEDICINE (OUTPATIENT)
Dept: GASTROENTEROLOGY | Age: 52
End: 2022-02-18
Payer: COMMERCIAL

## 2022-02-18 DIAGNOSIS — Z11.52 ENCOUNTER FOR SCREENING FOR COVID-19: Primary | ICD-10-CM

## 2022-02-18 DIAGNOSIS — K21.9 CHRONIC GERD: ICD-10-CM

## 2022-02-18 DIAGNOSIS — K22.70 LONG-SEGMENT BARRETT'S ESOPHAGUS: Primary | ICD-10-CM

## 2022-02-18 PROCEDURE — 99214 OFFICE O/P EST MOD 30 MIN: CPT | Performed by: NURSE PRACTITIONER

## 2022-02-18 ASSESSMENT — ENCOUNTER SYMPTOMS
SHORTNESS OF BREATH: 0
COUGH: 0
DIARRHEA: 0
ABDOMINAL PAIN: 0
CHOKING: 0
CONSTIPATION: 0
NAUSEA: 0
ABDOMINAL DISTENTION: 0
BLOOD IN STOOL: 0
TROUBLE SWALLOWING: 0
RECTAL PAIN: 0
VOMITING: 0
ANAL BLEEDING: 0

## 2022-02-18 NOTE — PROGRESS NOTES
2022    TELEHEALTH EVALUATION -- Audio/Visual (During WPTFW-75 public health emergency)    HPI:    Isabelle Sheppard (:  1970) has requested an audio/video evaluation for the following concern(s):  Chief Complaint   Patient presents with    Follow-up       Pt seen today for follow up. Hx long segment Garner's esophagus (-) dysplasia. He is due for repeat EGD. PPI was switched to Protonix 40 mg BID at last visit. Today, pt states this has really helped. He denies any breakthrough reflux or dysphagia. He states he is doing well. Last GFR WNL 2021. Last EGD 2021 - LSB, (-) Dysplasia, 6 month recall  Last Colonoscopy 2020 - diverticular disease, BCM, 5 year recall  Family hx colon polyps - Father    Review of Systems   Constitutional: Negative for activity change, appetite change, fatigue, fever and unexpected weight change. HENT: Negative for trouble swallowing. Respiratory: Negative for cough, choking and shortness of breath. Cardiovascular: Negative for chest pain. Gastrointestinal: Negative for abdominal distention, abdominal pain, anal bleeding, blood in stool, constipation, diarrhea, nausea, rectal pain and vomiting. Reflux - controlled   Allergic/Immunologic: Negative for food allergies. All other systems reviewed and are negative. Prior to Visit Medications    Medication Sig Taking? Authorizing Provider   atorvastatin (LIPITOR) 20 MG tablet Take 20 mg by mouth daily  Historical Provider, MD   pantoprazole (PROTONIX) 40 MG tablet Take 1 tablet by mouth 2 times daily  SHEFALI Mueller NP   tiZANidine (ZANAFLEX) 4 MG capsule Take 4 mg by mouth 2 times daily   Historical Provider, MD   Ascorbic Acid (VITAMIN C) 500 MG CHEW Take 500 mg by mouth daily  Historical Provider, MD   Glucosamine 500 MG CAPS Take by mouth daily   Historical Provider, MD   aspirin 81 MG EC tablet Take 81 mg by mouth daily.   Historical Provider, MD   therapeutic multivitamin-minerals (THERAGRAN-M) tablet Take 1 tablet by mouth daily.   Historical Provider, MD   Psyllium (METAMUCIL PO) Take by mouth daily   Historical Provider, MD       Social History     Tobacco Use    Smoking status: Former Smoker     Years: 1.00     Quit date: 9/23/2006     Years since quitting: 15.4    Smokeless tobacco: Never Used   Vaping Use    Vaping Use: Never used   Substance Use Topics    Alcohol use: Yes     Comment: occasional beer    Drug use: No        No Known Allergies,   Past Medical History:   Diagnosis Date    Madden's esophagus     GERD (gastroesophageal reflux disease)     Weight loss    ,   Past Surgical History:   Procedure Laterality Date    APPENDECTOMY      COLONOSCOPY N/A 09/23/2020    Dr Madison Russian disease-BCM, 5 yr recall    UPPER GASTROINTESTINAL ENDOSCOPY  08/01/2011        UPPER GASTROINTESTINAL ENDOSCOPY  08/01/2012        UPPER GASTROINTESTINAL ENDOSCOPY  10/07/2010    Dr Alonso Lav ENDOSCOPY  09/03/2010    Dr Alonso Lav ENDOSCOPY  11/24/2014    Dr Magnolia Stuart: long segment Madden's surveillance, biopsies neg for dysplasia    UPPER GASTROINTESTINAL ENDOSCOPY  11/2015    Long segment Madden's surveillance, biopsies neg for dysplasia    UPPER GASTROINTESTINAL ENDOSCOPY  12/21/2016    Dr Magnolia Stuart [employer])-hiatal hernia, (+) Long seg Madden's (from 23 cm to 30 cm, not 34-36 cm), (-) dysplasia--1 yr recall and referral to [facility] for ablation therapy    UPPER GASTROINTESTINAL ENDOSCOPY  12/20/2017    Dr Venkat Noble Co-Long seg madden's (+) dysplasia (-), 1 yr recall, ablation referral also    UPPER GASTROINTESTINAL ENDOSCOPY  03/06/2019    Dr Caron Gregorio Co-Distal esophagitis, gastritis (+) Madden's (-) dysplasia, 3 yr recall    UPPER GASTROINTESTINAL ENDOSCOPY N/A 09/23/2020    Dr Fabiola Camacho(+) LSB, (+)LGD x 3 segments, 1 indeterminate for dysplasia, 4 segments w/o dysplasia, 6 month recall    UPPER GASTROINTESTINAL ENDOSCOPY  06/02/2021    Dr Joanie Bearden Co (+) LSB, (-) dysplasia, 6 month recall    VASECTOMY     ,   Social History     Tobacco Use    Smoking status: Former Smoker     Years: 1.00     Quit date: 9/23/2006     Years since quitting: 15.4    Smokeless tobacco: Never Used   Vaping Use    Vaping Use: Never used   Substance Use Topics    Alcohol use: Yes     Comment: occasional beer    Drug use: No   ,   Family History   Problem Relation Age of Onset    High Blood Pressure Mother     High Blood Pressure Father     High Cholesterol Father     Colon Polyps Father     Liver Cancer Maternal Grandfather     Cirrhosis Maternal Grandfather     Liver Disease Maternal Grandfather     Lung Cancer Paternal Grandfather     Colon Cancer Neg Hx     Esophageal Cancer Neg Hx     Rectal Cancer Neg Hx     Stomach Cancer Neg Hx        PHYSICAL EXAMINATION:  [ INSTRUCTIONS:  \"[x]\" Indicates a positive item  \"[]\" Indicates a negative item  -- DELETE ALL ITEMS NOT EXAMINED]  Vital Signs: (As obtained by patient/caregiver or practitioner observation)    Blood pressure-  Heart rate-    Respiratory rate-    Temperature-  Pulse oximetry-     Constitutional: [x] Appears well-developed and well-nourished [x] No apparent distress      [] Abnormal-   Mental status  [x] Alert and awake  [x] Oriented to person/place/time [x]Able to follow commands      Eyes:  EOM    [x]  Normal  [] Abnormal-  Sclera  [x]  Normal  [] Abnormal -         Discharge [x]  None visible  [] Abnormal -    HENT:   [x] Normocephalic, atraumatic.   [] Abnormal   [x] Mouth/Throat: Mucous membranes are moist.     External Ears [x] Normal  [] Abnormal-     Neck: [x] No visualized mass     Pulmonary/Chest: [x] Respiratory effort normal.  [x] No visualized signs of difficulty breathing or respiratory distress        [] Abnormal-      Musculoskeletal:   [x] Normal gait with no signs of ataxia         [x] Normal range of motion of neck        [] Abnormal-       Neurological:        [x] No Facial Asymmetry (Cranial nerve 7 motor function) (limited exam to video visit)          [x] No gaze palsy        [] Abnormal-         Skin:        [x] No significant exanthematous lesions or discoloration noted on facial skin         [] Abnormal-            Psychiatric:       [x] Normal Affect [x] No Hallucinations        [] Abnormal-     Other pertinent observable physical exam findings-     ASSESSMENT/PLAN:  1. Long-segment Garner's esophagus  2. Chronic GERD    - Continue Protonix 40 mg po BID  - Schedule EGD  Nothing to eat or drink after midnight. No driving for 24 hours after procedure. Bring a  to procedure. No aspirin, NSAIDs, fish oil 5 days before procedure. I have discussed the benefits, alternatives, and risks (including bleeding, perforation and death)  for pursuing Endoscopy (EGD/Colonscopy/EUS/ERCP) with the patient and they are willing to continue. We also discussed the need for anesthesia, IV access, proper dietary changes, medication changes if necessary, and need for bowel prep (if ordered) prior to their Endoscopic procedure. They are aware they must have someone accompany them to their scheduled procedure to drive them home - they agree to the above and are willing to continue. Valdemar Maria, was evaluated through a synchronous (real-time) audio-video encounter. The patient (or guardian if applicable) is aware that this is a billable service, which includes applicable co-pays. This Virtual Visit was conducted with patient's (and/or legal guardian's) consent. The visit was conducted pursuant to the emergency declaration under the [address] [place] [address] authority and the [employer] General Act. Patient identification was verified, and a caregiver was present when appropriate.  The patient was located at home in a state where the provider was licensed to provide care. Total time spent on this encounter: Not billed by time    --SHEFALI Liu NP on 2/18/2022 at 9:51 AM    An electronic signature was used to authenticate this note.

## 2022-02-21 DIAGNOSIS — N52.9 ERECTILE DYSFUNCTION, UNSPECIFIED ERECTILE DYSFUNCTION TYPE: ICD-10-CM

## 2022-02-21 RX ORDER — SILDENAFIL 100 MG/1
TABLET, FILM COATED ORAL
Qty: 30 TABLET | Refills: 5 | Status: SHIPPED | OUTPATIENT
Start: 2022-02-21 | End: 2022-03-16 | Stop reason: SDUPTHER

## 2022-02-21 NOTE — TELEPHONE ENCOUNTER
Rx Refill Note  Requested Prescriptions     Pending Prescriptions Disp Refills   • sildenafil (VIAGRA) 100 MG tablet [Pharmacy Med Name: SILDENAFIL TABS 100MG] 30 tablet 5     Sig: TAKE 1 TABLET DAILY AS NEEDED FOR ERECTILE DYSFUNCTION      Last office visit with prescribing clinician: 12/3/2021      Next office visit with prescribing clinician: 3/18/2022            Kena Latham MA  02/21/22, 15:25 CST

## 2022-02-28 DIAGNOSIS — Z11.52 ENCOUNTER FOR SCREENING FOR COVID-19: ICD-10-CM

## 2022-02-28 LAB — SARS-COV-2, PCR: NOT DETECTED

## 2022-03-02 ENCOUNTER — ANESTHESIA EVENT (OUTPATIENT)
Dept: ENDOSCOPY | Age: 52
End: 2022-03-02
Payer: COMMERCIAL

## 2022-03-02 ENCOUNTER — ANESTHESIA (OUTPATIENT)
Dept: ENDOSCOPY | Age: 52
End: 2022-03-02
Payer: COMMERCIAL

## 2022-03-02 ENCOUNTER — HOSPITAL ENCOUNTER (OUTPATIENT)
Age: 52
Setting detail: OUTPATIENT SURGERY
Discharge: HOME | End: 2022-03-02
Attending: INTERNAL MEDICINE | Admitting: INTERNAL MEDICINE
Payer: COMMERCIAL

## 2022-03-02 VITALS
HEART RATE: 64 BPM | OXYGEN SATURATION: 95 % | TEMPERATURE: 98.3 F | BODY MASS INDEX: 42.66 KG/M2 | SYSTOLIC BLOOD PRESSURE: 130 MMHG | DIASTOLIC BLOOD PRESSURE: 83 MMHG | RESPIRATION RATE: 16 BRPM | WEIGHT: 315 LBS | HEIGHT: 72 IN

## 2022-03-02 VITALS — OXYGEN SATURATION: 94 % | DIASTOLIC BLOOD PRESSURE: 77 MMHG | SYSTOLIC BLOOD PRESSURE: 126 MMHG

## 2022-03-02 PROCEDURE — 3700000001 HC ADD 15 MINUTES (ANESTHESIA): Performed by: INTERNAL MEDICINE

## 2022-03-02 PROCEDURE — 2580000003 HC RX 258: Performed by: INTERNAL MEDICINE

## 2022-03-02 PROCEDURE — 3700000000 HC ANESTHESIA ATTENDED CARE: Performed by: INTERNAL MEDICINE

## 2022-03-02 PROCEDURE — 2500000003 HC RX 250 WO HCPCS: Performed by: NURSE ANESTHETIST, CERTIFIED REGISTERED

## 2022-03-02 PROCEDURE — 6360000002 HC RX W HCPCS: Performed by: NURSE ANESTHETIST, CERTIFIED REGISTERED

## 2022-03-02 PROCEDURE — 7100000010 HC PHASE II RECOVERY - FIRST 15 MIN: Performed by: INTERNAL MEDICINE

## 2022-03-02 PROCEDURE — 43239 EGD BIOPSY SINGLE/MULTIPLE: CPT | Performed by: INTERNAL MEDICINE

## 2022-03-02 PROCEDURE — 88305 TISSUE EXAM BY PATHOLOGIST: CPT

## 2022-03-02 PROCEDURE — 2709999900 HC NON-CHARGEABLE SUPPLY: Performed by: INTERNAL MEDICINE

## 2022-03-02 PROCEDURE — 3609012400 HC EGD TRANSORAL BIOPSY SINGLE/MULTIPLE: Performed by: INTERNAL MEDICINE

## 2022-03-02 RX ORDER — FENTANYL CITRATE 50 UG/ML
INJECTION, SOLUTION INTRAMUSCULAR; INTRAVENOUS PRN
Status: DISCONTINUED | OUTPATIENT
Start: 2022-03-02 | End: 2022-03-02 | Stop reason: SDUPTHER

## 2022-03-02 RX ORDER — MIDAZOLAM HYDROCHLORIDE 1 MG/ML
INJECTION INTRAMUSCULAR; INTRAVENOUS PRN
Status: DISCONTINUED | OUTPATIENT
Start: 2022-03-02 | End: 2022-03-02 | Stop reason: SDUPTHER

## 2022-03-02 RX ORDER — SODIUM CHLORIDE, SODIUM LACTATE, POTASSIUM CHLORIDE, CALCIUM CHLORIDE 600; 310; 30; 20 MG/100ML; MG/100ML; MG/100ML; MG/100ML
INJECTION, SOLUTION INTRAVENOUS CONTINUOUS
Status: DISCONTINUED | OUTPATIENT
Start: 2022-03-02 | End: 2022-03-02 | Stop reason: HOSPADM

## 2022-03-02 RX ORDER — LIDOCAINE HYDROCHLORIDE 10 MG/ML
INJECTION, SOLUTION INFILTRATION; PERINEURAL PRN
Status: DISCONTINUED | OUTPATIENT
Start: 2022-03-02 | End: 2022-03-02 | Stop reason: SDUPTHER

## 2022-03-02 RX ORDER — PROPOFOL 10 MG/ML
INJECTION, EMULSION INTRAVENOUS PRN
Status: DISCONTINUED | OUTPATIENT
Start: 2022-03-02 | End: 2022-03-02 | Stop reason: SDUPTHER

## 2022-03-02 RX ADMIN — FENTANYL CITRATE 50 MCG: 50 INJECTION INTRAMUSCULAR; INTRAVENOUS at 10:57

## 2022-03-02 RX ADMIN — LIDOCAINE HYDROCHLORIDE 40 MG: 10 INJECTION, SOLUTION INFILTRATION; PERINEURAL at 10:59

## 2022-03-02 RX ADMIN — MIDAZOLAM 2 MG: 1 INJECTION INTRAMUSCULAR; INTRAVENOUS at 10:55

## 2022-03-02 RX ADMIN — PROPOFOL 150 MG: 10 INJECTION, EMULSION INTRAVENOUS at 10:59

## 2022-03-02 RX ADMIN — SODIUM CHLORIDE, POTASSIUM CHLORIDE, SODIUM LACTATE AND CALCIUM CHLORIDE: 600; 310; 30; 20 INJECTION, SOLUTION INTRAVENOUS at 09:49

## 2022-03-02 ASSESSMENT — PAIN SCALES - GENERAL
PAINLEVEL_OUTOF10: 0
PAINLEVEL_OUTOF10: 0

## 2022-03-02 ASSESSMENT — PAIN - FUNCTIONAL ASSESSMENT: PAIN_FUNCTIONAL_ASSESSMENT: 0-10

## 2022-03-02 NOTE — ANESTHESIA POSTPROCEDURE EVALUATION
Department of Anesthesiology  Postprocedure Note    Patient: Reinaldo Jason  MRN: [id number]  YOB: 1970  Date of evaluation: 3/2/2022  Time:  11:19 AM     Procedure Summary     Date: 03/02/22 Room / Location: [address]    Anesthesia Start: 3579 Anesthesia Stop:     Procedure: EGD BIOPSY (N/A Abdomen) Diagnosis: (LONG SEGMENT BARRETTS)    Surgeons: Sean Parkinson MD Responsible Provider: SHEFALI Wong CRNA    Anesthesia Type: general, TIVA ASA Status: 3          Anesthesia Type: No value filed. Bijan Phase I:      Bijan Phase II:      Last vitals: Reviewed and per EMR flowsheets.        Anesthesia Post Evaluation    Patient location during evaluation: bedside  Patient participation: complete - patient participated  Level of consciousness: sleepy but conscious  Pain score: 0  Airway patency: patent  Nausea & Vomiting: no nausea and no vomiting  Complications: no  Cardiovascular status: hemodynamically stable and blood pressure returned to baseline  Respiratory status: acceptable and nasal cannula  Hydration status: stable

## 2022-03-02 NOTE — OP NOTE
Endoscopic Procedure Note    Patient: Valentine Guardado : 1970  Med Rec#: [id number] Acc#: [id number]     Primary Care Provider Christin Snellen, APRN - CNP  Referring Provider: Chloé MEEHAN    Endoscopist: Wang Meza MD    Date of Procedure:  3/2/2022    Procedure:   1. EGD with biopsy    Indications:   1. Known history of Long segment Garner's esophagus - there is notable history of LGD on biopsies in , but no dysplasia noted in . Patient states symptoms much improved recently on PPI       Anesthesia:  Sedation was administered by anesthesia who monitored the patient during the procedure. Estimated Blood Loss: minimal    Procedure:   After reviewing the patient's chart and obtaining informed consent, the patient was placed in the left lateral decubitus position. A forward-viewing Olympus endoscope was lubricated and inserted through the mouth into the oropharynx. Under direct visualization, the upper esophagus was intubated. The scope was advanced to the level of the third portion of duodenum. Scope was slowly withdrawn with careful inspection of the mucosal surfaces. The scope was retroflexed for inspection of the gastric fundus and incisura. Findings and maneuvers are listed in impression below. The patient tolerated the procedure well. The scope was removed. There were no immediate complications. Findings:   Esophagus: abnormal: there is evidence of circumferential Garner's esophagus from 22-36cm ([id number]). No obvious nodularity or ulcerations seen. Multiple 4-quadrant biopsies obtained every 2cm from 22-36cm. There is noted sliding hiatal hernia. Stomach:  normal       Duodenum: normal      IMPRESSION:  1. Garner's esophagus - biopsied        RECOMMENDATIONS:    1. Await path results, the patient will be contacted in 7-10 days with biopsy results. 2.  If there are changes of LGD/HGD on these biopsies, I will setup patient for RFA of his Garner's   3.   Continue PPI      The results were discussed with the patient and family. A copy of the images obtained were given to the patient.      Leobardo Rebolledo MD  3/2/2022  11:16 AM

## 2022-03-02 NOTE — H&P
Patient Name: Jamil Noland  : 1970  MRN: [id number]  DATE: 22    Allergies: No Known Allergies     ENDOSCOPY  History and Physical    Procedure:    [] Diagnostic Colonoscopy       [] Screening Colonoscopy  [x] EGD      [] ERCP      [] EUS       [] Other    [x] Previous office notes/History and Physical reviewed from the patients chart. Please see EMR for further details of HPI. I have examined the patient's status immediately prior to the procedure and:      Indications/HPI:    []Abdominal Pain   [x]Barretts  []Screening/Surveillance   []History of Polyps  []Dysphagia            [] +Cologard/DNA testing  []Abnormal Imaging              []EOE Hx              [] Family Hx of CRC/Polyps  []Anemia                            []Food Impaction       []Recent Poor Prep  []GI Bleed             []Lymphadenopathy  []History of Polyps  []Change in bowel habits []Heartburn/Reflux  []Cancer- GI/Lung  []Chest Pain - Non Cardiac []Heme (+) Stool []Ulcers  []Constipation  []Hemoptysis  []Incontinence    []Diarrhea  []Hypoxemia  []Rectal Bleed (BRBPR)  []Nausea/Vomiting   [] Varices  []Crohns/Colitis  []Pancreatic Cyst   [] Cirrhosis   []Pancreatitis    []Abnormal MRCP  []Elevated LFT [] Stent Removal, Previous ERCP  []Other:     Anesthesia:   [x] MAC [] Moderate Sedation   [] General   [] None     ROS: 12 pt Review of Symptoms was negative unless mentioned above    Medications:   Prior to Admission medications    Medication Sig Start Date End Date Taking?  Authorizing Provider   atorvastatin (LIPITOR) 20 MG tablet Take 20 mg by mouth daily 3/9/21  Yes Historical Provider, MD   pantoprazole (PROTONIX) 40 MG tablet Take 1 tablet by mouth 2 times daily 21  Yes SHEFALI Del Valle NP   tiZANidine (ZANAFLEX) 4 MG capsule Take 4 mg by mouth 2 times daily  20  Yes Historical Provider, MD   Ascorbic Acid (VITAMIN C) 500 MG CHEW Take 500 mg by mouth daily   Yes Historical Provider, MD   Glucosamine 500 MG CAPS Take by mouth daily    Yes Historical Provider, MD   aspirin 81 MG EC tablet Take 81 mg by mouth daily. Yes Historical Provider, MD   therapeutic multivitamin-minerals (THERAGRAN-M) tablet Take 1 tablet by mouth daily.    Yes Historical Provider, MD   Psyllium (METAMUCIL PO) Take by mouth daily    Yes Historical Provider, MD       Past Medical History:  Past Medical History:   Diagnosis Date    Madden's esophagus     CPAP (continuous positive airway pressure) dependence     GERD (gastroesophageal reflux disease)     Hyperlipidemia     Hypertension     Sleep apnea     Weight loss        Past Surgical History:  Past Surgical History:   Procedure Laterality Date    APPENDECTOMY      COLONOSCOPY N/A 09/23/2020    Dr Luisana Martínez disease-BCM, 5 yr recall    UPPER GASTROINTESTINAL ENDOSCOPY  08/01/2011        UPPER GASTROINTESTINAL ENDOSCOPY  08/01/2012        UPPER GASTROINTESTINAL ENDOSCOPY  10/07/2010    Dr Ferreira Angry ENDOSCOPY  09/03/2010    Dr Ferreira Angry ENDOSCOPY  11/24/2014    Dr Michael Hernandes: long segment Madden's surveillance, biopsies neg for dysplasia    UPPER GASTROINTESTINAL ENDOSCOPY  11/2015    Long segment Madden's surveillance, biopsies neg for dysplasia    UPPER GASTROINTESTINAL ENDOSCOPY  12/21/2016    Dr Michael Hernandes [facility])-hiatal hernia, (+) Long seg Madden's (from 23 cm to 30 cm, not 34-36 cm), (-) dysplasia--1 yr recall and referral to [facility] for ablation therapy    UPPER GASTROINTESTINAL ENDOSCOPY  12/20/2017    Dr Eryn Neal Co-Long seg madden's (+) dysplasia (-), 1 yr recall, ablation referral also    UPPER GASTROINTESTINAL ENDOSCOPY  03/06/2019    Dr Jinny Fraser Co-Distal esophagitis, gastritis (+) Madden's (-) dysplasia, 3 yr recall    UPPER GASTROINTESTINAL ENDOSCOPY N/A 09/23/2020    Dr Farhad Camacho(+) LSB, (+)LGD x 3 segments, 1 indeterminate for dysplasia, 4 segments w/o dysplasia, 6 month recall    UPPER GASTROINTESTINAL ENDOSCOPY  06/02/2021    Dr Malina Mckeon Co (+) LSB, (-) dysplasia, 6 month recall    VASECTOMY         Social History:  Social History     Tobacco Use    Smoking status: Former Smoker     Years: 1.00     Quit date: 9/23/2006     Years since quitting: 15.4    Smokeless tobacco: Never Used   Vaping Use    Vaping Use: Never used   Substance Use Topics    Alcohol use: Yes     Comment: occasional beer    Drug use: No       Vital Signs:   Vitals:    03/02/22 0943   BP: 137/71   Pulse: 83   Resp: 18   Temp: 98.3 °F (36.8 °C)   SpO2: 97%        Physical Exam:  Cardiac:  [x]WNL  []Comments:  Pulmonary:  [x]WNL   []Comments:  Neuro/Mental Status:  [x]WNL  []Comments:  Abdominal:  [x]WNL    []Comments:  Other:   []WNL  []Comments:    Informed Consent:  The risks and benefits of the procedure have been discussed with either the patient or if they cannot consent, their representative. Assessment:  Patient examined and appropriate for planned sedation and procedure. Plan:  Proceed with planned sedation and procedure as above.          Sean Parkinson MD

## 2022-03-02 NOTE — ANESTHESIA PRE PROCEDURE
Department of Anesthesiology  Preprocedure Note       Name:  Jamil Noland   Age:  46 y.o.  :  1970                                          MRN:  [id number]         Date:  3/2/2022      Surgeon: Corinthia Goodpasture):  Epi Gallegos MD    Procedure: Procedure(s):  EGD BIOPSY    Medications prior to admission:   Prior to Admission medications    Medication Sig Start Date End Date Taking? Authorizing Provider   atorvastatin (LIPITOR) 20 MG tablet Take 20 mg by mouth daily 3/9/21   Historical Provider, MD   pantoprazole (PROTONIX) 40 MG tablet Take 1 tablet by mouth 2 times daily 21   SHEFALI Del Valle - NP   tiZANidine (ZANAFLEX) 4 MG capsule Take 4 mg by mouth 2 times daily  20   Historical Provider, MD   Ascorbic Acid (VITAMIN C) 500 MG CHEW Take 500 mg by mouth daily    Historical Provider, MD   Glucosamine 500 MG CAPS Take by mouth daily     Historical Provider, MD   aspirin 81 MG EC tablet Take 81 mg by mouth daily. Historical Provider, MD   therapeutic multivitamin-minerals (THERAGRAN-M) tablet Take 1 tablet by mouth daily. Historical Provider, MD   Psyllium (METAMUCIL PO) Take by mouth daily     Historical Provider, MD       Current medications:    No current outpatient medications on file. No current facility-administered medications for this visit.        Allergies:  No Known Allergies    Problem List:    Patient Active Problem List   Diagnosis Code    Garner's esophagus without dysplasia K22.70    Heartburn R12    Weight loss, intentional [id number]    Long segment Garner's esophagus K22.70    Chronic GERD K21.9    Garner's esophagus determined by biopsy K22.70       Past Medical History:        Diagnosis Date    Garner's esophagus     GERD (gastroesophageal reflux disease)     Weight loss        Past Surgical History:        Procedure Laterality Date    APPENDECTOMY      COLONOSCOPY N/A 2020    Dr Pawan Camacho-Diverticular disease-BCM, 5 yr recall    UPPER GASTROINTESTINAL ENDOSCOPY  08/01/2011        UPPER GASTROINTESTINAL ENDOSCOPY  08/01/2012        UPPER GASTROINTESTINAL ENDOSCOPY  10/07/2010    Dr Destini Joya ENDOSCOPY  09/03/2010    Dr Destini Joya ENDOSCOPY  11/24/2014    Dr Abe Beckett: long segment Madden's surveillance, biopsies neg for dysplasia    UPPER GASTROINTESTINAL ENDOSCOPY  11/2015    Long segment Madden's surveillance, biopsies neg for dysplasia    UPPER GASTROINTESTINAL ENDOSCOPY  12/21/2016    Dr Abe Roy [facility])-hiatal hernia, (+) Long seg Madden's (from 23 cm to 30 cm, not 34-36 cm), (-) dysplasia--1 yr recall and referral [facility] for ablation therapy    UPPER GASTROINTESTINAL ENDOSCOPY  12/20/2017    Dr Jd Kearns Co-Long seg madden's (+) dysplasia (-), 1 yr recall, ablation referral also    UPPER GASTROINTESTINAL ENDOSCOPY  03/06/2019    Dr Latrice Anna-Distal esophagitis, gastritis (+) Madden's (-) dysplasia, 3 yr recall    UPPER GASTROINTESTINAL ENDOSCOPY N/A 09/23/2020    Dr Shauna Camacho(+) LSB, (+)LGD x 3 segments, 1 indeterminate for dysplasia, 4 segments w/o dysplasia, 6 month recall    UPPER GASTROINTESTINAL ENDOSCOPY  06/02/2021    Dr Latrice Anna (+) LSB, (-) dysplasia, 6 month recall    VASECTOMY         Social History:    Social History     Tobacco Use    Smoking status: Former Smoker     Years: 1.00     Quit date: 9/23/2006     Years since quitting: 15.4    Smokeless tobacco: Never Used   Substance Use Topics    Alcohol use: Yes     Comment: occasional beer                                Counseling given: Not Answered      Vital Signs (Current): There were no vitals filed for this visit.                                            BP Readings from Last 3 Encounters:   05/03/21 124/72   09/23/20 114/63   09/23/20 127/75       NPO Status:                                                                                 BMI:   Wt Readings from Last 3 Encounters:   05/03/21 (!) 324 lb 12.8 oz (147.3 kg)   09/23/20 (!) 330 lb (149.7 kg)   08/25/20 (!) 330 lb (149.7 kg)     There is no height or weight on file to calculate BMI.    CBC: No results found for: WBC, RBC, HGB, HCT, MCV, RDW, PLT    CMP: No results found for: NA, K, CL, CO2, BUN, CREATININE, GFRAA, AGRATIO, LABGLOM, GLUCOSE, GLU, PROT, CALCIUM, BILITOT, ALKPHOS, AST, ALT    POC Tests: No results for input(s): POCGLU, POCNA, POCK, POCCL, POCBUN, POCHEMO, POCHCT in the last 72 hours. Coags: No results found for: PROTIME, INR, APTT    HCG (If Applicable): No results found for: PREGTESTUR, PREGSERUM, HCG, HCGQUANT     ABGs: No results found for: PHART, PO2ART, TGR7BXX, GOJ6UWG, BEART, [id number]     Type & Screen (If Applicable):  No results found for: LABABO, LABRH    Drug/Infectious Status (If Applicable):  No results found for: HIV, HEPCAB    COVID-19 Screening (If Applicable):   Lab Results   Component Value Date    COVID19 Not Detected 02/28/2022         Anesthesia Evaluation  Patient summary reviewed and Nursing notes reviewed no history of anesthetic complications:   Airway: Mallampati: II  TM distance: >3 FB   Neck ROM: full  Mouth opening: < 3 FB Dental: normal exam         Pulmonary:normal exam    (+) sleep apnea: on CPAP,                             Cardiovascular:    (+) hypertension:, hyperlipidemia         Beta Blocker:  Not on Beta Blocker         Neuro/Psych:   Negative Neuro/Psych ROS              GI/Hepatic/Renal:   (+) GERD: well controlled, bowel prep, morbid obesity         ROS comment: bmi 45  Garner's . Endo/Other: Negative Endo/Other ROS                    Abdominal:             Vascular: negative vascular ROS. Other Findings:               Anesthesia Plan      general and TIVA     ASA 3       Induction: intravenous. Anesthetic plan and risks discussed with patient.                       SHEFALI Ley - DARIAN   3/2/2022

## 2022-03-11 DIAGNOSIS — M25.511 ACUTE PAIN OF RIGHT SHOULDER: ICD-10-CM

## 2022-03-11 RX ORDER — TIZANIDINE 4 MG/1
TABLET ORAL
Qty: 90 TABLET | Refills: 11 | Status: SHIPPED | OUTPATIENT
Start: 2022-03-11 | End: 2022-03-16 | Stop reason: SDUPTHER

## 2022-03-11 NOTE — TELEPHONE ENCOUNTER
Rx Refill Note  Requested Prescriptions     Pending Prescriptions Disp Refills   • tiZANidine (ZANAFLEX) 4 MG tablet [Pharmacy Med Name: TIZANIDINE HCL TABS 4MG] 90 tablet 11     Sig: TAKE 1 TABLET EVERY 8 HOURS AS NEEDED FOR MUSCLE SPASMS      Last office visit with prescribing clinician: 12/3/2021      Next office visit with prescribing clinician: 3/18/2022  Last Filled 01/18/22 #90 no rf            Maya Tran MA  03/11/22, 11:15 CST

## 2022-03-16 ENCOUNTER — OFFICE VISIT (OUTPATIENT)
Dept: FAMILY MEDICINE CLINIC | Facility: CLINIC | Age: 52
End: 2022-03-16

## 2022-03-16 VITALS
TEMPERATURE: 97.9 F | RESPIRATION RATE: 16 BRPM | DIASTOLIC BLOOD PRESSURE: 86 MMHG | BODY MASS INDEX: 42.66 KG/M2 | SYSTOLIC BLOOD PRESSURE: 130 MMHG | WEIGHT: 315 LBS | HEIGHT: 72 IN | HEART RATE: 67 BPM | OXYGEN SATURATION: 99 %

## 2022-03-16 DIAGNOSIS — E78.2 MIXED HYPERLIPIDEMIA: ICD-10-CM

## 2022-03-16 DIAGNOSIS — R73.01 IMPAIRED FASTING GLUCOSE: ICD-10-CM

## 2022-03-16 DIAGNOSIS — K22.70 BARRETT'S ESOPHAGUS WITHOUT DYSPLASIA: ICD-10-CM

## 2022-03-16 DIAGNOSIS — M25.511 ACUTE PAIN OF RIGHT SHOULDER: ICD-10-CM

## 2022-03-16 DIAGNOSIS — I10 ESSENTIAL HYPERTENSION: Primary | ICD-10-CM

## 2022-03-16 DIAGNOSIS — Z12.5 SCREENING FOR PROSTATE CANCER: ICD-10-CM

## 2022-03-16 DIAGNOSIS — N52.9 ERECTILE DYSFUNCTION, UNSPECIFIED ERECTILE DYSFUNCTION TYPE: ICD-10-CM

## 2022-03-16 PROCEDURE — 99214 OFFICE O/P EST MOD 30 MIN: CPT | Performed by: NURSE PRACTITIONER

## 2022-03-16 RX ORDER — LISINOPRIL 20 MG/1
20 TABLET ORAL DAILY
Qty: 90 TABLET | Refills: 1 | Status: SHIPPED | OUTPATIENT
Start: 2022-03-16 | End: 2022-09-16 | Stop reason: SDUPTHER

## 2022-03-16 RX ORDER — SILDENAFIL 100 MG/1
100 TABLET, FILM COATED ORAL DAILY PRN
Qty: 30 TABLET | Refills: 5 | Status: SHIPPED | OUTPATIENT
Start: 2022-03-16 | End: 2023-03-27

## 2022-03-16 RX ORDER — ATORVASTATIN CALCIUM 20 MG/1
20 TABLET, FILM COATED ORAL DAILY
Qty: 90 TABLET | Refills: 3 | Status: SHIPPED | OUTPATIENT
Start: 2022-03-16 | End: 2022-09-16 | Stop reason: SDUPTHER

## 2022-03-16 RX ORDER — TIZANIDINE 4 MG/1
4 TABLET ORAL EVERY 8 HOURS PRN
Qty: 90 TABLET | Refills: 1 | Status: SHIPPED | OUTPATIENT
Start: 2022-03-16 | End: 2022-09-14

## 2022-03-16 RX ORDER — PANTOPRAZOLE SODIUM 40 MG/1
40 TABLET, DELAYED RELEASE ORAL DAILY
Qty: 90 TABLET | Refills: 1 | Status: SHIPPED | OUTPATIENT
Start: 2022-03-16 | End: 2022-09-16 | Stop reason: SDUPTHER

## 2022-03-16 NOTE — PROGRESS NOTES
Chief Complaint  Hypertension (Patient here for follow up. )    Subjective          Danny Vega presents to [facility]  Follow up for chronic problems of hyperlipidemia stable with atorvastatin, htn stable with lisinopril, gerd stable with pantoprazole, muscle spasms stable with tizanidine, ED  Stable sildenafil.  He has been doing     Hypertension  This is a chronic problem. The current episode started more than 1 year ago. The problem has been gradually improving since onset. The problem is controlled. Pertinent negatives include no blurred vision, chest pain, neck pain, orthopnea, PND or shortness of breath. Risk factors for coronary artery disease include dyslipidemia, family history, male gender and obesity. Past treatments include ACE inhibitors. Current antihypertension treatment includes ACE inhibitors. The current treatment provides mild improvement. There are no compliance problems.  There is no history of angina, kidney disease, CVA, heart failure, PVD or retinopathy.   Hyperlipidemia  This is a chronic problem. The current episode started more than 1 year ago. The problem is uncontrolled. Recent lipid tests were reviewed and are high. Exacerbating diseases include obesity. He has no history of diabetes. Pertinent negatives include no chest pain or shortness of breath. Current antihyperlipidemic treatment includes statins. The current treatment provides mild improvement of lipids. There are no compliance problems.  Risk factors for coronary artery disease include dyslipidemia, family history, hypertension, male sex and obesity.   Heartburn  He complains of heartburn. He reports no chest pain, no globus sensation, no hoarse voice, no stridor, no tooth decay or no water brash. This is a chronic problem. The current episode started more than 1 year ago. The problem occurs occasionally. The problem has been unchanged. The heartburn duration is less than a minute. The  "heartburn is of mild intensity. The heartburn does not wake him from sleep. The heartburn does not limit his activity. The heartburn doesn't change with position. The symptoms are aggravated by certain foods. Risk factors include obesity. He has tried an antacid and a PPI for the symptoms. The treatment provided mild relief. Past procedures do not include an EGD or H. pylori antibody titer.   Erectile Dysfunction  This is a new problem. The current episode started more than 1 year ago. The problem is unchanged. The nature of his difficulty is achieving erection. Non-physiologic factors contributing to erectile dysfunction are a decreased libido and performance anxiety. Irritative symptoms do not include frequency, nocturia or urgency. Obstructive symptoms include an intermittent stream. Nothing aggravates the symptoms. Past treatments include sildenafil. The treatment provided mild relief. He has been using treatment for 6 to 12 months. He has had no adverse reactions caused by medications. Risk factors include nerve injury and hypertension.     The following portions of the patient's history were reviewed and updated as appropriate: allergies, current medications, past family history, past medical history, past social history, past surgical history and problem list.      Objective   Vital Signs:   /86 (BP Location: Right arm, Patient Position: Sitting, Cuff Size: Adult)   Pulse 67   Temp 97.9 °F (36.6 °C) (Infrared)   Resp 16   Ht 182.9 cm (72\")   Wt (!) 154 kg (340 lb)   SpO2 99%   BMI 46.11 kg/m²     Physical Exam  Vitals and nursing note reviewed.   Constitutional:       General: He is awake.      Appearance: Normal appearance. He is well-developed and well-groomed.   HENT:      Head: Normocephalic and atraumatic.      Right Ear: Hearing, tympanic membrane, ear canal and external ear normal.      Left Ear: Hearing, tympanic membrane, ear canal and external ear normal.      Nose: Nose normal.      " Mouth/Throat:      Lips: Pink.      Pharynx: Oropharynx is clear.   Eyes:      General: Lids are normal.      Conjunctiva/sclera: Conjunctivae normal.   Cardiovascular:      Rate and Rhythm: Normal rate and regular rhythm.      Heart sounds: Normal heart sounds.   Pulmonary:      Effort: Pulmonary effort is normal.      Breath sounds: Normal breath sounds and air entry.   Musculoskeletal:      Cervical back: Full passive range of motion without pain.      Right lower leg: No edema.      Left lower leg: No edema.   Lymphadenopathy:      Head:      Right side of head: No submental, submandibular or tonsillar adenopathy.      Left side of head: No submental, submandibular or tonsillar adenopathy.   Skin:     General: Skin is warm and dry.   Neurological:      Mental Status: He is alert.      Sensory: Sensation is intact.      Motor: Motor function is intact.      Coordination: Coordination is intact.      Gait: Gait is intact.   Psychiatric:         Attention and Perception: Attention and perception normal.         Mood and Affect: Mood and affect normal.         Speech: Speech normal.         Behavior: Behavior normal. Behavior is cooperative.         Thought Content: Thought content normal.         Cognition and Memory: Cognition and memory normal.         Judgment: Judgment normal.        Result Review :                 Assessment and Plan    Diagnoses and all orders for this visit:    1. Essential hypertension (Primary)  -     lisinopril (PRINIVIL,ZESTRIL) 20 MG tablet; Take 1 tablet by mouth Daily.  Dispense: 90 tablet; Refill: 1  -     CBC (No Diff)  -     Comprehensive metabolic panel    2. Mixed hyperlipidemia  -     atorvastatin (LIPITOR) 20 MG tablet; Take 1 tablet by mouth Daily.  Dispense: 90 tablet; Refill: 3  -     Lipid panel    3. Somers's esophagus without dysplasia  -     pantoprazole (PROTONIX) 40 MG EC tablet; Take 1 tablet by mouth Daily.  Dispense: 90 tablet; Refill: 1    4. Acute pain of right  shoulder  -     tiZANidine (ZANAFLEX) 4 MG tablet; Take 1 tablet by mouth Every 8 (Eight) Hours As Needed for Muscle Spasms.  Dispense: 90 tablet; Refill: 1    5. Screening for prostate cancer  -     PSA Screen    6. Impaired fasting glucose  -     Hemoglobin A1c    7. Erectile dysfunction, unspecified erectile dysfunction type  -     sildenafil (VIAGRA) 100 MG tablet; Take 1 tablet by mouth Daily As Needed for Erectile Dysfunction.  Dispense: 30 tablet; Refill: 5      I spent 14 minutes caring for Danny on this date of service. This time includes time spent by me in the following activities:preparing for the visit, performing a medically appropriate examination and/or evaluation , counseling and educating the patient/family/caregiver, ordering medications, tests, or procedures, documenting information in the medical record and care coordination  Follow Up   Return in about 6 months (around 9/16/2022) for Recheck chronic.  Patient was given instructions and counseling regarding his condition or for health maintenance advice. Please see specific information pulled into the AVS if appropriate.     Answers for HPI/ROS submitted by the patient on 3/15/2022  What is the primary reason for your visit?: Other  Please describe your symptoms.: No symptoms. Just need 6th month exam for prescription refills and bloodwork  Have you had these symptoms before?: Yes  How long have you been having these symptoms?: Greater than 2 weeks    Electronically signed by Ronit Mccabe, YURIY, APRN, 03/16/22, 10:09 AM CDT.

## 2022-03-17 LAB
ALBUMIN SERPL-MCNC: 4.6 G/DL (ref 3.5–5.2)
ALBUMIN/GLOB SERPL: 1.9 G/DL
ALP SERPL-CCNC: 66 U/L (ref 39–117)
ALT SERPL-CCNC: 25 U/L (ref 1–41)
AST SERPL-CCNC: 22 U/L (ref 1–40)
BILIRUB SERPL-MCNC: 0.3 MG/DL (ref 0–1.2)
BUN SERPL-MCNC: 13 MG/DL (ref 6–20)
BUN/CREAT SERPL: 14.1 (ref 7–25)
CALCIUM SERPL-MCNC: 9.7 MG/DL (ref 8.6–10.5)
CHLORIDE SERPL-SCNC: 103 MMOL/L (ref 98–107)
CHOLEST SERPL-MCNC: 112 MG/DL (ref 0–200)
CO2 SERPL-SCNC: 28.6 MMOL/L (ref 22–29)
CREAT SERPL-MCNC: 0.92 MG/DL (ref 0.76–1.27)
EGFRCR SERPLBLD CKD-EPI 2021: 100.7 ML/MIN/1.73
ERYTHROCYTE [DISTWIDTH] IN BLOOD BY AUTOMATED COUNT: 13.5 % (ref 12.3–15.4)
GLOBULIN SER CALC-MCNC: 2.4 GM/DL
GLUCOSE SERPL-MCNC: 124 MG/DL (ref 65–99)
HBA1C MFR BLD: 5.9 % (ref 4.8–5.6)
HCT VFR BLD AUTO: 43 % (ref 37.5–51)
HDLC SERPL-MCNC: 38 MG/DL (ref 40–60)
HGB BLD-MCNC: 14.2 G/DL (ref 13–17.7)
LDLC SERPL CALC-MCNC: 53 MG/DL (ref 0–100)
MCH RBC QN AUTO: 30 PG (ref 26.6–33)
MCHC RBC AUTO-ENTMCNC: 33 G/DL (ref 31.5–35.7)
MCV RBC AUTO: 90.9 FL (ref 79–97)
PLATELET # BLD AUTO: 244 10*3/MM3 (ref 140–450)
POTASSIUM SERPL-SCNC: 4.9 MMOL/L (ref 3.5–5.2)
PROT SERPL-MCNC: 7 G/DL (ref 6–8.5)
PSA SERPL-MCNC: 0.54 NG/ML (ref 0–4)
RBC # BLD AUTO: 4.73 10*6/MM3 (ref 4.14–5.8)
SODIUM SERPL-SCNC: 140 MMOL/L (ref 136–145)
TRIGL SERPL-MCNC: 115 MG/DL (ref 0–150)
VLDLC SERPL CALC-MCNC: 21 MG/DL (ref 5–40)
WBC # BLD AUTO: 7.92 10*3/MM3 (ref 3.4–10.8)

## 2022-05-09 NOTE — RESULT ENCOUNTER NOTE
Path reviewed. Please call patient and tell him I would recommend RFA of his Garner's    If he would prefer to discuss, then let's setup OV o/w we can go straight to procedure.      Thanks

## 2022-06-21 ENCOUNTER — TELEPHONE (OUTPATIENT)
Dept: GASTROENTEROLOGY | Age: 52
End: 2022-06-21

## 2022-06-21 ENCOUNTER — ANESTHESIA EVENT (OUTPATIENT)
Dept: ENDOSCOPY | Age: 52
End: 2022-06-21
Payer: COMMERCIAL

## 2022-06-21 ENCOUNTER — ANESTHESIA (OUTPATIENT)
Dept: ENDOSCOPY | Age: 52
End: 2022-06-21
Payer: COMMERCIAL

## 2022-06-21 ENCOUNTER — HOSPITAL ENCOUNTER (OUTPATIENT)
Age: 52
Setting detail: OUTPATIENT SURGERY
Discharge: HOME | End: 2022-06-21
Attending: INTERNAL MEDICINE | Admitting: INTERNAL MEDICINE
Payer: COMMERCIAL

## 2022-06-21 VITALS
SYSTOLIC BLOOD PRESSURE: 128 MMHG | DIASTOLIC BLOOD PRESSURE: 101 MMHG | RESPIRATION RATE: 18 BRPM | HEART RATE: 68 BPM | TEMPERATURE: 96.6 F | WEIGHT: 315 LBS | HEIGHT: 72 IN | BODY MASS INDEX: 42.66 KG/M2 | OXYGEN SATURATION: 97 %

## 2022-06-21 DIAGNOSIS — G89.18 POST-OP PAIN: ICD-10-CM

## 2022-06-21 DIAGNOSIS — R07.89 NON-CARDIAC CHEST PAIN: Primary | ICD-10-CM

## 2022-06-21 PROCEDURE — 3609012400 HC EGD TRANSORAL BIOPSY SINGLE/MULTIPLE: Performed by: INTERNAL MEDICINE

## 2022-06-21 PROCEDURE — 2580000003 HC RX 258: Performed by: INTERNAL MEDICINE

## 2022-06-21 PROCEDURE — 43270 EGD LESION ABLATION: CPT | Performed by: INTERNAL MEDICINE

## 2022-06-21 PROCEDURE — 2500000003 HC RX 250 WO HCPCS: Performed by: REGISTERED NURSE

## 2022-06-21 PROCEDURE — 6370000000 HC RX 637 (ALT 250 FOR IP): Performed by: INTERNAL MEDICINE

## 2022-06-21 PROCEDURE — 7100000010 HC PHASE II RECOVERY - FIRST 15 MIN: Performed by: INTERNAL MEDICINE

## 2022-06-21 PROCEDURE — 7100000011 HC PHASE II RECOVERY - ADDTL 15 MIN: Performed by: INTERNAL MEDICINE

## 2022-06-21 PROCEDURE — 6360000002 HC RX W HCPCS: Performed by: INTERNAL MEDICINE

## 2022-06-21 PROCEDURE — C1888 ENDOVAS NON-CARDIAC ABL CATH: HCPCS | Performed by: INTERNAL MEDICINE

## 2022-06-21 PROCEDURE — 3700000001 HC ADD 15 MINUTES (ANESTHESIA): Performed by: INTERNAL MEDICINE

## 2022-06-21 PROCEDURE — 6360000002 HC RX W HCPCS: Performed by: REGISTERED NURSE

## 2022-06-21 PROCEDURE — 2709999900 HC NON-CHARGEABLE SUPPLY: Performed by: INTERNAL MEDICINE

## 2022-06-21 PROCEDURE — 3700000000 HC ANESTHESIA ATTENDED CARE: Performed by: INTERNAL MEDICINE

## 2022-06-21 PROCEDURE — C1769 GUIDE WIRE: HCPCS | Performed by: INTERNAL MEDICINE

## 2022-06-21 RX ORDER — TRAMADOL HYDROCHLORIDE 50 MG/1
50 TABLET ORAL EVERY 6 HOURS PRN
Qty: 12 TABLET | Refills: 0 | Status: SHIPPED | OUTPATIENT
Start: 2022-06-21 | End: 2022-06-24

## 2022-06-21 RX ORDER — PANTOPRAZOLE SODIUM 40 MG/1
40 TABLET, DELAYED RELEASE ORAL 2 TIMES DAILY
Qty: 180 TABLET | Refills: 3 | Status: SHIPPED | OUTPATIENT
Start: 2022-06-21

## 2022-06-21 RX ORDER — LISINOPRIL 20 MG/1
20 TABLET ORAL DAILY
COMMUNITY

## 2022-06-21 RX ORDER — FENTANYL CITRATE 50 UG/ML
INJECTION, SOLUTION INTRAMUSCULAR; INTRAVENOUS PRN
Status: DISCONTINUED | OUTPATIENT
Start: 2022-06-21 | End: 2022-06-21 | Stop reason: SDUPTHER

## 2022-06-21 RX ORDER — PROPOFOL 10 MG/ML
INJECTION, EMULSION INTRAVENOUS PRN
Status: DISCONTINUED | OUTPATIENT
Start: 2022-06-21 | End: 2022-06-21 | Stop reason: SDUPTHER

## 2022-06-21 RX ORDER — SODIUM CHLORIDE, SODIUM LACTATE, POTASSIUM CHLORIDE, CALCIUM CHLORIDE 600; 310; 30; 20 MG/100ML; MG/100ML; MG/100ML; MG/100ML
INJECTION, SOLUTION INTRAVENOUS CONTINUOUS
Status: DISCONTINUED | OUTPATIENT
Start: 2022-06-21 | End: 2022-06-21 | Stop reason: HOSPADM

## 2022-06-21 RX ORDER — SUCRALFATE ORAL 1 G/10ML
1 SUSPENSION ORAL 4 TIMES DAILY
Qty: 1200 ML | Refills: 3 | Status: SHIPPED | OUTPATIENT
Start: 2022-06-21

## 2022-06-21 RX ORDER — LIDOCAINE HYDROCHLORIDE 10 MG/ML
INJECTION, SOLUTION EPIDURAL; INFILTRATION; INTRACAUDAL; PERINEURAL PRN
Status: DISCONTINUED | OUTPATIENT
Start: 2022-06-21 | End: 2022-06-21 | Stop reason: SDUPTHER

## 2022-06-21 RX ORDER — ACETYLCYSTEINE 200 MG/ML
SOLUTION ORAL; RESPIRATORY (INHALATION) PRN
Status: DISCONTINUED | OUTPATIENT
Start: 2022-06-21 | End: 2022-06-21 | Stop reason: HOSPADM

## 2022-06-21 RX ADMIN — FENTANYL CITRATE 100 MCG: 50 INJECTION INTRAMUSCULAR; INTRAVENOUS at 08:42

## 2022-06-21 RX ADMIN — LIDOCAINE HYDROCHLORIDE: 20 SOLUTION ORAL; TOPICAL at 09:28

## 2022-06-21 RX ADMIN — SODIUM CHLORIDE, POTASSIUM CHLORIDE, SODIUM LACTATE AND CALCIUM CHLORIDE: 600; 310; 30; 20 INJECTION, SOLUTION INTRAVENOUS at 08:04

## 2022-06-21 RX ADMIN — LIDOCAINE HYDROCHLORIDE 50 MG: 10 INJECTION, SOLUTION EPIDURAL; INFILTRATION; INTRACAUDAL; PERINEURAL at 08:42

## 2022-06-21 RX ADMIN — PROPOFOL 400 MG: 10 INJECTION, EMULSION INTRAVENOUS at 08:42

## 2022-06-21 RX ADMIN — FENTANYL CITRATE 100 MCG: 50 INJECTION INTRAMUSCULAR; INTRAVENOUS at 08:50

## 2022-06-21 ASSESSMENT — PAIN DESCRIPTION - LOCATION: LOCATION: THROAT

## 2022-06-21 ASSESSMENT — PAIN - FUNCTIONAL ASSESSMENT: PAIN_FUNCTIONAL_ASSESSMENT: NONE - DENIES PAIN

## 2022-06-21 ASSESSMENT — PAIN SCALES - GENERAL
PAINLEVEL_OUTOF10: 3
PAINLEVEL_OUTOF10: 5

## 2022-06-21 ASSESSMENT — PAIN DESCRIPTION - DESCRIPTORS: DESCRIPTORS: BURNING

## 2022-06-21 NOTE — H&P
Patient Name: Eva Estrada  : 1970  MRN: [id number]  DATE: 22    Allergies: No Known Allergies     ENDOSCOPY  History and Physical    Procedure:    [] Diagnostic Colonoscopy       [] Screening Colonoscopy  [x] EGD      [] ERCP      [] EUS       [] Other    [x] Previous office notes/History and Physical reviewed from the patients chart. Please see EMR for further details of HPI. I have examined the patient's status immediately prior to the procedure and:      Indications/HPI:    []Abdominal Pain   [x]Barretts  []Screening/Surveillance   []History of Polyps  []Dysphagia            [] +Cologard/DNA testing  []Abnormal Imaging              []EOE Hx              [] Family Hx of CRC/Polyps  []Anemia                            []Food Impaction       []Recent Poor Prep  []GI Bleed             []Lymphadenopathy  []History of Polyps  []Change in bowel habits []Heartburn/Reflux  []Cancer- GI/Lung  []Chest Pain - Non Cardiac []Heme (+) Stool []Ulcers  []Constipation  []Hemoptysis  []Incontinence    []Diarrhea  []Hypoxemia  []Rectal Bleed (BRBPR)  []Nausea/Vomiting   [] Varices  []Crohns/Colitis  []Pancreatic Cyst   [] Cirrhosis   []Pancreatitis    []Abnormal MRCP  []Elevated LFT [] Stent Removal, Previous ERCP  []Other:     Anesthesia:   [x] MAC [] Moderate Sedation   [] General   [] None     ROS: 12 pt Review of Symptoms was negative unless mentioned above    Medications:   Prior to Admission medications    Medication Sig Start Date End Date Taking?  Authorizing Provider   atorvastatin (LIPITOR) 20 MG tablet Take 20 mg by mouth daily 3/9/21   Historical Provider, MD   pantoprazole (PROTONIX) 40 MG tablet Take 1 tablet by mouth 2 times daily 21   SHEFALI Stafford NP   tiZANidine (ZANAFLEX) 4 MG capsule Take 4 mg by mouth 2 times daily  20   Historical Provider, MD   Ascorbic Acid (VITAMIN C) 500 MG CHEW Take 500 mg by mouth daily    Historical Provider, MD   Glucosamine 500 MG CAPS Take by mouth daily     Historical Provider, MD   aspirin 81 MG EC tablet Take 81 mg by mouth daily. Historical Provider, MD   therapeutic multivitamin-minerals (THERAGRAN-M) tablet Take 1 tablet by mouth daily.     Historical Provider, MD   Psyllium (METAMUCIL PO) Take by mouth daily     Historical Provider, MD       Past Medical History:  Past Medical History:   Diagnosis Date    Madden's esophagus with low grade dysplasia     CPAP (continuous positive airway pressure) dependence     GERD (gastroesophageal reflux disease)     Hyperlipidemia     Hypertension     Sleep apnea     Weight loss        Past Surgical History:  Past Surgical History:   Procedure Laterality Date    APPENDECTOMY      COLONOSCOPY N/A 09/23/2020    Dr Hilaria Camacho-Diverticular disease-BCM, 5 yr recall    UPPER GASTROINTESTINAL ENDOSCOPY  08/01/2011    Dr Janel Haney neg, Long segment Madden's, 1 yr recall    UPPER GASTROINTESTINAL ENDOSCOPY  08/01/2012    Dr Darinel Pope (+) Madden's, no dysplasia, 1 yr recall    UPPER GASTROINTESTINAL ENDOSCOPY  10/07/2010    Dr Annette Hamman neg, Very tight proximal peptic stricture, repeat in 6 wks    UPPER GASTROINTESTINAL ENDOSCOPY  11/24/2014    Dr Coral Hurtado: long segment Madden's surveillance, biopsies neg for dysplasia    UPPER GASTROINTESTINAL ENDOSCOPY  11/24/2015    Dr Eladio Godinezs segment Madden's surveillance, biopsies neg for dysplasia, no kehinde    UPPER GASTROINTESTINAL ENDOSCOPY  12/21/2016    Dr Coral Hurtado [facility])-hiatal hernia, (+) Long seg Madden's (from 23 cm to 30 cm, not 34-36 cm), (-) dysplasia--1 yr recall and referral to [facility] for ablation therapy    UPPER GASTROINTESTINAL ENDOSCOPY  12/20/2017    Dr Katarzyna Porras [place]-Long seg madden's (+) dysplasia (-), 1 yr recall, ablation referral also    UPPER GASTROINTESTINAL ENDOSCOPY  03/06/2019    Dr Kate Hightower [place]-Distal esophagitis, gastritis (+) Madden's (-) dysplasia, 3 yr recall    UPPER GASTROINTESTINAL ENDOSCOPY N/A 09/23/2020    Dr Rosa Camacho(+) LSB, (+)LGD x 3 segments, 1 indeterminate for dysplasia, 4 segments w/o dysplasia, 6 month recall    UPPER GASTROINTESTINAL ENDOSCOPY  06/02/2021    Dr Mirta Anna (+) LSB, (-) dysplasia, 6 month recall    UPPER GASTROINTESTINAL ENDOSCOPY  03/02/2022    Dr Sutton Body (+) Garner's    UPPER GASTROINTESTINAL ENDOSCOPY  08/23/2013    Dr Nikunj Mcfadden neg, Long seg Garner's, no dysplasia, gastritis, 1 yr recall    UPPER GASTROINTESTINAL ENDOSCOPY  09/03/2010    Dr Chapin Woo proximal esophageal stricture @ 21 cm, Long segment Garner's, no dysplasia, HH, 1 month recall    UPPER GASTROINTESTINAL ENDOSCOPY N/A 03/02/2022    Dr Sutton Body (+) Garner's, w/LGD x 2, (+) Garner's, indeterminant for dysplasia x 1, (+) Garner's, (-) dysplasia x 4, (+) Garner's, no dysplasia x 1, RFA recommended    VASECTOMY         Social History:  Social History     Tobacco Use    Smoking status: Former Smoker     Years: 1.00     Quit date: 9/23/2006     Years since quitting: 15.7    Smokeless tobacco: Never Used   Vaping Use    Vaping Use: Never used   Substance Use Topics    Alcohol use: Yes     Comment: occasional beer    Drug use: No       Vital Signs:   Vitals:    06/21/22 0754   BP: (!) 139/91   Pulse: 74   Resp: 17   Temp: 97.8 °F (36.6 °C)   SpO2: 99%        Physical Exam:  Cardiac:  [x]WNL  []Comments:  Pulmonary:  [x]WNL   []Comments:  Neuro/Mental Status:  [x]WNL  []Comments:  Abdominal:  [x]WNL    []Comments:  Other:   []WNL  []Comments:    Informed Consent:  The risks and benefits of the procedure have been discussed with either the patient or if they cannot consent, their representative. Assessment:  Patient examined and appropriate for planned sedation and procedure. Plan:  Proceed with planned sedation and procedure as above.          Ted Richardson MD

## 2022-06-21 NOTE — TELEPHONE ENCOUNTER
Rebel Berry,    Per Dr Yoshi Saavedra today:    IMPRESSION:  1. Garner's Esophagus s/p RFA     RECOMMENDATIONS:       - Repeat EGD in 8 weeks for RFA #2  - PPI 40 mg twice daily for the next 3 months  - GI cocktail as needed  - Carafate slurry 3-4 times daily as needed  - Liquid diet for the next 24 hours, then advancing diet to soft diet for the next week as tolerated  - Avoid NSAIDs for the next week  - Please contact our office at [phone] if you develop significant chest pain, difficulty swallowing, fever, bleeding, abdominal pain, difficulty breathing, or vomiting.            The results were discussed with the patient and family.   A copy of the images obtained were given to the patient.      Ricardo Petersen MD  6/21/2022  9:09 AM

## 2022-06-21 NOTE — ANESTHESIA POSTPROCEDURE EVALUATION
Department of Anesthesiology  Postprocedure Note    Patient: Wagner Carr  MRN: [id number]  [place]: 1970  Date of evaluation: 6/21/2022      Procedure Summary     Date: 06/21/22 Room / Location: [address]    Anesthesia Start: 2949 Anesthesia Stop: 3917    Procedure: EGD w/RFA (N/A Abdomen) Diagnosis:       Garner's esophagus with dysplasia      (Garner's esophagus with dysplasia [K22.719])    Surgeons: Yudy Bates MD Responsible Provider: SHEFALI Wallace CRNA    Anesthesia Type: general, TIVA ASA Status: 3          Anesthesia Type: No value filed.     Bijan Phase I:      Bijan Phase II:      Last vitals:   Vitals Value Taken Time   /78 06/21/22 0914   Temp 98 °F (36.7 °C) 06/21/22 0913   Pulse 78 06/21/22 0913   Resp 12 06/21/22 0913   SpO2 95 % 06/21/22 0913         Anesthesia Post Evaluation    Patient location during evaluation: PACU  Patient participation: complete - patient participated  Level of consciousness: sleepy but conscious  Pain score: 0  Airway patency: patent  Nausea & Vomiting: no nausea and no vomiting  Complications: no  Cardiovascular status: hemodynamically stable  Respiratory status: acceptable, room air and spontaneous ventilation  Hydration status: euvolemic

## 2022-06-21 NOTE — OP NOTE
Endoscopic Procedure Note    Patient: Ronen Pires : 1970  Med Rec#: [id number] Acc#: [id number]     Primary Care Provider SHEFALI Davalos - CNP  Referring Provider: Robin MEEHAN    Endoscopist: Germán Norton MD    Date of Procedure:  2022    Procedure:   1. EGD with RFA of esophagus     Indications:   1. 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 Estimated Blood Loss: minimal    Procedure:   After reviewing the patient's chart and obtaining informed consent, the patient was placed in the left lateral decubitus position. A forward-viewing Olympus endoscope was lubricated and inserted through the mouth into the oropharynx. Under direct visualization, the upper esophagus was intubated. The scope was advanced to the level of the third portion of duodenum. Scope was slowly withdrawn with careful inspection of the mucosal surfaces. The scope was retroflexed for inspection of the gastric fundus and incisura. Findings and maneuvers are listed in impression below. The patient tolerated the procedure well. The scope was removed. There were no immediate complications. Findings:     Esophagus: abnormal: in the distal esophagus there was evidence approximately 14 cm of abnormal tissue. The gastric folds were measured at 36 cm from the incisors. The top of intestinal metaplasia appeared to be at approximately 22 cm. This appeared to be flat and circumferential.  This had previously been biopsied consistent with LGD. The Garner's esophagus tissue was irrigated with Mucomyst 1% mixed with water. A guidewire was placed and the endoscope removed. A Barrx 360 express RFA balloon catheter was introduced over the guidewire. The endoscope was introduced in a [id number] manner with the balloon catheter.   The balloon electrode was positioned under direct visualization so that the proximal edge electrode slightly above the top of the distal metaplasia. The balloon was automatically inflated and energy applied at 230 W and 10 J/cm². The displayed inner diameter measurement was recorded. The electrode was moved distally 4 cm, aligning the proximal edge of electrode with the distal edge of the ablation zone. Inflation ablation was repeated until the top the gastric folds was reached. The balloon catheter was removed, cleaned and subsequently reintroduced after cleaning of ablation zone. The ablation zone was cleaned of coagulative debris with irrigation and suction using endoscope and cleaning. The balloon catheter was positioned under requisition so that the proximal edge of the electrode was at the proximal edge of the ablation zone. Inflation, ablation, and repositioning were repeated as in the first set of treatments. The balloon catheter and guidewire removed. Endoscopically complete ablation of the intestinal metaplasia was seen. Stomach:  normal      Duodenum: normal      IMPRESSION:  1. Garner's Esophagus s/p RFA     RECOMMENDATIONS:      - Repeat EGD in 8 weeks for RFA #2  - PPI 40 mg twice daily for the next 3 months  - GI cocktail as needed  - Carafate slurry 3-4 times daily as needed  - Liquid diet for the next 24 hours, then advancing diet to soft diet for the next week as tolerated  - Avoid NSAIDs for the next week  - Please contact our office at [phone] if you develop significant chest pain, difficulty swallowing, fever, bleeding, abdominal pain, difficulty breathing, or vomiting. The results were discussed with the patient and family. A copy of the images obtained were given to the patient.      Yamilka Hilliard MD  6/21/2022  9:09 AM

## 2022-06-21 NOTE — ANESTHESIA PRE PROCEDURE
positive airway pressure) dependence     GERD (gastroesophageal reflux disease)     Hyperlipidemia     Hypertension     Sleep apnea     Weight loss        Past Surgical History:        Procedure Laterality Date    APPENDECTOMY      COLONOSCOPY N/A 09/23/2020    Dr Christophe Camacho-Diverticular disease-BCM, 5 yr recall    UPPER GASTROINTESTINAL ENDOSCOPY  08/01/2011    Dr Jacquelin Ward neg, Long segment Madden's, 1 yr recall    UPPER GASTROINTESTINAL ENDOSCOPY  08/01/2012    Dr Rupinder Ayala (+) Madden's, no dysplasia, 1 yr recall    UPPER GASTROINTESTINAL ENDOSCOPY  10/07/2010    Dr Lesly Farley neg, Very tight proximal peptic stricture, repeat in 6 wks    UPPER GASTROINTESTINAL ENDOSCOPY  11/24/2014    Dr Liliana Morales: long segment Madden's surveillance, biopsies neg for dysplasia    UPPER GASTROINTESTINAL ENDOSCOPY  11/24/2015    Dr Muna Pereira segment Madden's surveillance, biopsies neg for dysplasia, no kehinde    UPPER GASTROINTESTINAL ENDOSCOPY  12/21/2016    Dr Liliana Thomas [facility])-hiatal hernia, (+) Long seg Madden's (from 23 cm to 30 cm, not 34-36 cm), (-) dysplasia--1 yr recall and referral to [facility] for ablation therapy    UPPER GASTROINTESTINAL ENDOSCOPY  12/20/2017    Dr Wilda Anna-Long seg madden's (+) dysplasia (-), 1 yr recall, ablation referral also    UPPER GASTROINTESTINAL ENDOSCOPY  03/06/2019    Dr Chino Anna-Distal esophagitis, gastritis (+) Madden's (-) dysplasia, 3 yr recall    UPPER GASTROINTESTINAL ENDOSCOPY N/A 09/23/2020    Dr Christophe Camacho(+) LSB, (+)LGD x 3 segments, 1 indeterminate for dysplasia, 4 segments w/o dysplasia, 6 month recall    UPPER GASTROINTESTINAL ENDOSCOPY  06/02/2021    Dr Chino Anna (+) LSB, (-) dysplasia, 6 month recall    UPPER GASTROINTESTINAL ENDOSCOPY  03/02/2022    Dr Debbie Marcial (+) Madden's    UPPER GASTROINTESTINAL ENDOSCOPY  08/23/2013    Dr Jacquelin Ward neg, Long seg Madden's, no dysplasia, gastritis, 1 yr recall   Lisa Ernandez UPPER GASTROINTESTINAL ENDOSCOPY  09/03/2010    Dr Haris Rockwell proximal esophageal stricture @ 21 cm, Long segment Garner's, no dysplasia, HH, 1 month recall    UPPER GASTROINTESTINAL ENDOSCOPY N/A 03/02/2022    Dr Stacy Grande (+) Garner's, w/LGD x 2, (+) Garner's, indeterminant for dysplasia x 1, (+) Garner's, (-) dysplasia x 4, (+) Garner's, no dysplasia x 1, RFA recommended    VASECTOMY         Social History:    Social History     Tobacco Use    Smoking status: Former Smoker     Years: 1.00     Quit date: 9/23/2006     Years since quitting: 15.7    Smokeless tobacco: Never Used   Substance Use Topics    Alcohol use: Yes     Comment: occasional beer                                Counseling given: Not Answered      Vital Signs (Current): There were no vitals filed for this visit. BP Readings from Last 3 Encounters:   06/21/22 (!) 139/91   03/02/22 130/83   03/02/22 126/77       NPO Status:                                                                                 BMI:   Wt Readings from Last 3 Encounters:   06/21/22 (!) 320 lb (145.2 kg)   03/02/22 (!) 320 lb (145.2 kg)   05/03/21 (!) 324 lb 12.8 oz (147.3 kg)     There is no height or weight on file to calculate BMI.    CBC: No results found for: WBC, RBC, HGB, HCT, MCV, RDW, PLT    CMP: No results found for: NA, K, CL, CO2, BUN, CREATININE, GFRAA, AGRATIO, LABGLOM, GLUCOSE, GLU, PROT, CALCIUM, BILITOT, ALKPHOS, AST, ALT    POC Tests: No results for input(s): POCGLU, POCNA, POCK, POCCL, POCBUN, POCHEMO, POCHCT in the last 72 hours.     Coags: No results found for: PROTIME, INR, APTT    HCG (If Applicable): No results found for: PREGTESTUR, PREGSERUM, HCG, HCGQUANT     ABGs: No results found for: PHART, PO2ART, FLE1HAY, MQD9BGJ, BEART, K4TGGRNW     Type & Screen (If Applicable):  No results found for: LABABO, LABRH    Drug/Infectious Status (If Applicable):  No results found for: HIV, HEPCAB    COVID-19 Screening (If Applicable):   Lab Results   Component Value Date    COVID19 Not Detected 02/28/2022         Anesthesia Evaluation  Patient summary reviewed and Nursing notes reviewed no history of anesthetic complications:   Airway: Mallampati: II  TM distance: >3 FB   Neck ROM: full  Mouth opening: < 3 FB   Dental: normal exam         Pulmonary:normal exam    (+) sleep apnea: on CPAP,                             Cardiovascular:    (+) hypertension:, hyperlipidemia         Beta Blocker:  Not on Beta Blocker         Neuro/Psych:   Negative Neuro/Psych ROS              GI/Hepatic/Renal:   (+) GERD: well controlled, bowel prep, morbid obesity         ROS comment: bmi 45  Garner's . Endo/Other: Negative Endo/Other ROS                    Abdominal:   (+) obese,           Vascular: negative vascular ROS. Other Findings:             Anesthesia Plan      general and TIVA     ASA 3       Induction: intravenous. Anesthetic plan and risks discussed with patient.                         SHEFALI Painting - CRNA   6/21/2022

## 2022-07-01 NOTE — TELEPHONE ENCOUNTER
Patient is scheduled for repeat EGD with Dr. Marleni Ashley at [facility] on [email or web address].    Patient is aware of appointment date and all instructions have been mailed to him and are scanned into his chart

## 2022-08-15 ENCOUNTER — ANESTHESIA EVENT (OUTPATIENT)
Dept: ENDOSCOPY | Age: 52
End: 2022-08-15
Payer: COMMERCIAL

## 2022-08-16 ENCOUNTER — TELEPHONE (OUTPATIENT)
Dept: GASTROENTEROLOGY | Age: 52
End: 2022-08-16

## 2022-08-16 ENCOUNTER — ANESTHESIA (OUTPATIENT)
Dept: ENDOSCOPY | Age: 52
End: 2022-08-16
Payer: COMMERCIAL

## 2022-08-16 ENCOUNTER — HOSPITAL ENCOUNTER (OUTPATIENT)
Age: 52
Setting detail: OUTPATIENT SURGERY
Discharge: HOME | End: 2022-08-16
Attending: INTERNAL MEDICINE | Admitting: INTERNAL MEDICINE
Payer: COMMERCIAL

## 2022-08-16 VITALS
SYSTOLIC BLOOD PRESSURE: 141 MMHG | DIASTOLIC BLOOD PRESSURE: 89 MMHG | WEIGHT: 315 LBS | RESPIRATION RATE: 18 BRPM | HEART RATE: 64 BPM | HEIGHT: 72 IN | OXYGEN SATURATION: 96 % | TEMPERATURE: 96.7 F | BODY MASS INDEX: 42.66 KG/M2

## 2022-08-16 PROCEDURE — 2580000003 HC RX 258: Performed by: INTERNAL MEDICINE

## 2022-08-16 PROCEDURE — 2709999900 HC NON-CHARGEABLE SUPPLY: Performed by: INTERNAL MEDICINE

## 2022-08-16 PROCEDURE — C1888 ENDOVAS NON-CARDIAC ABL CATH: HCPCS | Performed by: INTERNAL MEDICINE

## 2022-08-16 PROCEDURE — 6360000002 HC RX W HCPCS: Performed by: INTERNAL MEDICINE

## 2022-08-16 PROCEDURE — 43270 EGD LESION ABLATION: CPT | Performed by: INTERNAL MEDICINE

## 2022-08-16 PROCEDURE — C1769 GUIDE WIRE: HCPCS | Performed by: INTERNAL MEDICINE

## 2022-08-16 PROCEDURE — 3609012400 HC EGD TRANSORAL BIOPSY SINGLE/MULTIPLE: Performed by: INTERNAL MEDICINE

## 2022-08-16 PROCEDURE — 2500000003 HC RX 250 WO HCPCS: Performed by: NURSE ANESTHETIST, CERTIFIED REGISTERED

## 2022-08-16 PROCEDURE — 3700000000 HC ANESTHESIA ATTENDED CARE: Performed by: INTERNAL MEDICINE

## 2022-08-16 PROCEDURE — 3700000001 HC ADD 15 MINUTES (ANESTHESIA): Performed by: INTERNAL MEDICINE

## 2022-08-16 PROCEDURE — 6370000000 HC RX 637 (ALT 250 FOR IP): Performed by: INTERNAL MEDICINE

## 2022-08-16 PROCEDURE — 6360000002 HC RX W HCPCS: Performed by: NURSE ANESTHETIST, CERTIFIED REGISTERED

## 2022-08-16 PROCEDURE — 7100000011 HC PHASE II RECOVERY - ADDTL 15 MIN: Performed by: INTERNAL MEDICINE

## 2022-08-16 PROCEDURE — 7100000010 HC PHASE II RECOVERY - FIRST 15 MIN: Performed by: INTERNAL MEDICINE

## 2022-08-16 RX ORDER — SODIUM CHLORIDE, SODIUM LACTATE, POTASSIUM CHLORIDE, CALCIUM CHLORIDE 600; 310; 30; 20 MG/100ML; MG/100ML; MG/100ML; MG/100ML
INJECTION, SOLUTION INTRAVENOUS CONTINUOUS
Status: DISCONTINUED | OUTPATIENT
Start: 2022-08-16 | End: 2022-08-16 | Stop reason: HOSPADM

## 2022-08-16 RX ORDER — MIDAZOLAM HYDROCHLORIDE 1 MG/ML
INJECTION INTRAMUSCULAR; INTRAVENOUS PRN
Status: DISCONTINUED | OUTPATIENT
Start: 2022-08-16 | End: 2022-08-16 | Stop reason: SDUPTHER

## 2022-08-16 RX ORDER — ACETYLCYSTEINE 200 MG/ML
SOLUTION ORAL; RESPIRATORY (INHALATION) PRN
Status: DISCONTINUED | OUTPATIENT
Start: 2022-08-16 | End: 2022-08-16 | Stop reason: HOSPADM

## 2022-08-16 RX ORDER — ONDANSETRON 2 MG/ML
INJECTION INTRAMUSCULAR; INTRAVENOUS PRN
Status: DISCONTINUED | OUTPATIENT
Start: 2022-08-16 | End: 2022-08-16 | Stop reason: SDUPTHER

## 2022-08-16 RX ORDER — PROPOFOL 10 MG/ML
INJECTION, EMULSION INTRAVENOUS CONTINUOUS PRN
Status: DISCONTINUED | OUTPATIENT
Start: 2022-08-16 | End: 2022-08-16 | Stop reason: SDUPTHER

## 2022-08-16 RX ORDER — LIDOCAINE HYDROCHLORIDE 10 MG/ML
INJECTION, SOLUTION INFILTRATION; PERINEURAL PRN
Status: DISCONTINUED | OUTPATIENT
Start: 2022-08-16 | End: 2022-08-16 | Stop reason: SDUPTHER

## 2022-08-16 RX ORDER — DEXAMETHASONE SODIUM PHOSPHATE 10 MG/ML
INJECTION, SOLUTION INTRAMUSCULAR; INTRAVENOUS PRN
Status: DISCONTINUED | OUTPATIENT
Start: 2022-08-16 | End: 2022-08-16 | Stop reason: SDUPTHER

## 2022-08-16 RX ORDER — FENTANYL CITRATE 50 UG/ML
INJECTION, SOLUTION INTRAMUSCULAR; INTRAVENOUS PRN
Status: DISCONTINUED | OUTPATIENT
Start: 2022-08-16 | End: 2022-08-16 | Stop reason: SDUPTHER

## 2022-08-16 RX ADMIN — DEXAMETHASONE SODIUM PHOSPHATE 10 MG: 10 INJECTION, SOLUTION INTRAMUSCULAR; INTRAVENOUS at 10:27

## 2022-08-16 RX ADMIN — LIDOCAINE HYDROCHLORIDE: 20 SOLUTION ORAL; TOPICAL at 10:38

## 2022-08-16 RX ADMIN — PROPOFOL 120 MCG/KG/MIN: 10 INJECTION, EMULSION INTRAVENOUS at 09:54

## 2022-08-16 RX ADMIN — MIDAZOLAM 2 MG: 1 INJECTION INTRAMUSCULAR; INTRAVENOUS at 09:49

## 2022-08-16 RX ADMIN — FENTANYL CITRATE 100 MCG: 50 INJECTION INTRAMUSCULAR; INTRAVENOUS at 09:52

## 2022-08-16 RX ADMIN — LIDOCAINE HYDROCHLORIDE 40 MG: 10 INJECTION, SOLUTION INFILTRATION; PERINEURAL at 09:54

## 2022-08-16 RX ADMIN — SODIUM CHLORIDE, POTASSIUM CHLORIDE, SODIUM LACTATE AND CALCIUM CHLORIDE: 600; 310; 30; 20 INJECTION, SOLUTION INTRAVENOUS at 08:42

## 2022-08-16 RX ADMIN — ONDANSETRON 4 MG: 2 INJECTION INTRAMUSCULAR; INTRAVENOUS at 09:51

## 2022-08-16 ASSESSMENT — PAIN SCALES - GENERAL
PAINLEVEL_OUTOF10: 2
PAINLEVEL_OUTOF10: 3

## 2022-08-16 ASSESSMENT — PAIN DESCRIPTION - LOCATION: LOCATION: THROAT

## 2022-08-16 ASSESSMENT — PAIN - FUNCTIONAL ASSESSMENT: PAIN_FUNCTIONAL_ASSESSMENT: 0-10

## 2022-08-16 ASSESSMENT — PAIN DESCRIPTION - DESCRIPTORS: DESCRIPTORS: BURNING

## 2022-08-16 NOTE — TELEPHONE ENCOUNTER
PT's wife called today for GI cocktail to be sent to [address] [place] [address]. I talked to Dr Serenity Lorenz and he asked that I call it in. Also, pt thought he needed refill on carafate but when I stated he had refills @ [facility], they said he didn't need it refilled at this time. I have called in the GI cocktail to [facility] after discussing that they can make it for the pt. Pt's wife has been advised.

## 2022-08-16 NOTE — ANESTHESIA POSTPROCEDURE EVALUATION
Department of Anesthesiology  Postprocedure Note    Patient: Will Salcedo  MRN: [id number]  [place]: 1970  Date of evaluation: 8/16/2022      Procedure Summary     Date: 08/16/22 Room / Location: [address]    Anesthesia Start: 6075 Anesthesia Stop:     Procedure: EGD W/ RFA (Abdomen) Diagnosis:       Garner's esophagus with low grade dysplasia      (Garner's esophagus with low grade dysplasia [K22.710])    Surgeons: Lalitha Mehta MD Responsible Provider: SHEFALI Gee CRNA    Anesthesia Type: general, TIVA ASA Status: 3          Anesthesia Type: No value filed.     Bijan Phase I: Bijan Score: 10    Bijan Phase II:        Anesthesia Post Evaluation    Patient location during evaluation: bedside  Patient participation: complete - patient participated  Level of consciousness: sleepy but conscious  Pain score: 0  Airway patency: patent  Nausea & Vomiting: no nausea and no vomiting  Complications: no  Cardiovascular status: hemodynamically stable and blood pressure returned to baseline  Respiratory status: acceptable and nasal cannula  Hydration status: stable

## 2022-08-16 NOTE — ANESTHESIA PRE PROCEDURE
Department of Anesthesiology  Preprocedure Note       Name:  Sanna Hu   Age:  46 y.o.  :  1970                                          MRN:  [id number]         Date:  2022      Surgeon: Sienna Maddox):  Antonio Ballard MD    Procedure: Procedure(s):  EGD W/ RFA    Medications prior to admission:   Prior to Admission medications    Medication Sig Start Date End Date Taking? Authorizing Provider   lisinopril (PRINIVIL;ZESTRIL) 20 MG tablet Take 20 mg by mouth daily    Historical Provider, MD   pantoprazole (PROTONIX) 40 MG tablet Take 1 tablet by mouth 2 times daily 22   Antonio Ballard MD   sucralfate (CARAFATE) 1 GM/10ML suspension Take 10 mLs by mouth 4 times daily 22   Antonio Ballard MD   atorvastatin (LIPITOR) 20 MG tablet Take 20 mg by mouth daily 3/9/21   Historical Provider, MD   tiZANidine (ZANAFLEX) 4 MG capsule Take 4 mg by mouth 2 times daily  20   Historical Provider, MD   Ascorbic Acid (VITAMIN C) 500 MG CHEW Take 500 mg by mouth daily    Historical Provider, MD   Glucosamine 500 MG CAPS Take by mouth daily     Historical Provider, MD   aspirin 81 MG EC tablet Take 81 mg by mouth daily. Historical Provider, MD   therapeutic multivitamin-minerals (THERAGRAN-M) tablet Take 1 tablet by mouth daily. Historical Provider, MD   Psyllium (METAMUCIL PO) Take by mouth daily     Historical Provider, MD       Current medications:    No current facility-administered medications for this visit. No current outpatient medications on file.      Facility-Administered Medications Ordered in Other Visits   Medication Dose Route Frequency Provider Last Rate Last Admin    lactated ringers infusion   IntraVENous Continuous Antonio Ballard  mL/hr at 22 0842 New Bag at 22 0842       Allergies:  No Known Allergies    Problem List:    Patient Active Problem List   Diagnosis Code    Garner's esophagus without dysplasia K22.70    Heartburn R12    Weight loss, intentional [id number]  Long segment Madden's esophagus K22.70    Chronic GERD K21.9    Madden's esophagus determined by biopsy K22.70       Past Medical History:        Diagnosis Date    Madden's esophagus with low grade dysplasia     CPAP (continuous positive airway pressure) dependence     GERD (gastroesophageal reflux disease)     Hyperlipidemia     Hypertension     Sleep apnea     Weight loss        Past Surgical History:        Procedure Laterality Date    APPENDECTOMY      COLONOSCOPY N/A 09/23/2020    Dr Chris Camacho-Diverticular disease-BCM, 5 yr recall    UPPER GASTROINTESTINAL ENDOSCOPY  08/01/2011    Dr Jennie Hannon neg, Long segment Madden's, 1 yr recall    UPPER GASTROINTESTINAL ENDOSCOPY  08/01/2012    Dr Vernon Burk (+) Madden's, no dysplasia, 1 yr recall    UPPER GASTROINTESTINAL ENDOSCOPY  10/07/2010    Dr Stacey Simmons neg, Very tight proximal peptic stricture, repeat in 6 wks    UPPER GASTROINTESTINAL ENDOSCOPY  11/24/2014    Dr Bradley Sotomayor: long segment Madden's surveillance, biopsies neg for dysplasia    UPPER GASTROINTESTINAL ENDOSCOPY  11/24/2015    Dr Jolly Finer segment Madden's surveillance, biopsies neg for dysplasia, no kehinde    UPPER GASTROINTESTINAL ENDOSCOPY  12/21/2016    Dr Bradley Sotomayor [facility])-hiatal hernia, (+) Long seg Madden's (from 23 cm to 30 cm, not 34-36 cm), (-) dysplasia--1 yr recall and referral to [facility] for ablation therapy    UPPER GASTROINTESTINAL ENDOSCOPY  12/20/2017    Dr Caterina Daugherty [facility]-Long seg madden's (+) dysplasia (-), 1 yr recall, ablation referral also    UPPER GASTROINTESTINAL ENDOSCOPY  03/06/2019    Dr Jean Anna-Distal esophagitis, gastritis (+) Madden's (-) dysplasia, 3 yr recall    UPPER GASTROINTESTINAL ENDOSCOPY N/A 09/23/2020    Dr Chris Camacho(+) LSB, (+)LGD x 3 segments, 1 indeterminate for dysplasia, 4 segments w/o dysplasia, 6 month recall    UPPER GASTROINTESTINAL ENDOSCOPY  06/02/2021    Dr Jean Anna (+) LSB, (-) dysplasia, 6 month recall    UPPER GASTROINTESTINAL ENDOSCOPY  03/02/2022    Dr Guerda Vivar (+) Garner's    UPPER GASTROINTESTINAL ENDOSCOPY  08/23/2013    Dr Mehdi Rodriguez neg, Long seg Garner's, no dysplasia, gastritis, 1 yr recall    UPPER GASTROINTESTINAL ENDOSCOPY  09/03/2010    Dr Thaddeus Pina proximal esophageal stricture @ 21 cm, Long segment Garner's, no dysplasia, HH, 1 month recall    UPPER GASTROINTESTINAL ENDOSCOPY N/A 03/02/2022    Dr Guerda Vivar (+) Garner's, w/LGD x 2, (+) Garner's, indeterminant for dysplasia x 1, (+) Garner's, (-) dysplasia x 4, (+) Garner's, no dysplasia x 1, RFA recommended    UPPER GASTROINTESTINAL ENDOSCOPY N/A 06/21/2022    Dr Consuelo Camacho-w/RFA #1-Repeat in 8 wks    VASECTOMY         Social History:    Social History     Tobacco Use    Smoking status: Former     Years: 1.00     Types: Cigarettes     Quit date: 9/23/2006     Years since quitting: 15.9    Smokeless tobacco: Never   Substance Use Topics    Alcohol use: Yes     Comment: occasional beer                                Counseling given: Not Answered      Vital Signs (Current): There were no vitals filed for this visit. BP Readings from Last 3 Encounters:   08/16/22 (!) 145/83   06/21/22 (!) 128/101   03/02/22 130/83       NPO Status:                                                                                 BMI:   Wt Readings from Last 3 Encounters:   08/16/22 (!) 320 lb (145.2 kg)   06/21/22 (!) 320 lb (145.2 kg)   03/02/22 (!) 320 lb (145.2 kg)     There is no height or weight on file to calculate BMI.    CBC: No results found for: WBC, RBC, HGB, HCT, MCV, RDW, PLT    CMP: No results found for: NA, K, CL, CO2, BUN, CREATININE, GFRAA, AGRATIO, LABGLOM, GLUCOSE, GLU, PROT, CALCIUM, BILITOT, ALKPHOS, AST, ALT    POC Tests: No results for input(s): POCGLU, POCNA, POCK, POCCL, POCBUN, POCHEMO, POCHCT in the last 72 hours.     Coags: No results found for: PROTIME, INR, APTT    HCG (If Applicable): No results found for: PREGTESTUR, PREGSERUM, HCG, HCGQUANT     ABGs: No results found for: PHART, PO2ART, ZHJ9TPC, DZN5UIV, BEART, L6XBWESB     Type & Screen (If Applicable):  No results found for: LABABO, LABRH    Drug/Infectious Status (If Applicable):  No results found for: HIV, HEPCAB    COVID-19 Screening (If Applicable):   Lab Results   Component Value Date/Time    COVID19 Not Detected 02/28/2022 11:56 AM         Anesthesia Evaluation  Patient summary reviewed and Nursing notes reviewed no history of anesthetic complications:   Airway: Mallampati: II  TM distance: >3 FB   Neck ROM: full  Mouth opening: < 3 FB   Dental: normal exam         Pulmonary:normal exam    (+) sleep apnea: on CPAP,                             Cardiovascular:  Exercise tolerance: no interval change,   (+) hypertension:, hyperlipidemia         Beta Blocker:  Not on Beta Blocker         Neuro/Psych:   Negative Neuro/Psych ROS              GI/Hepatic/Renal:   (+) GERD: no interval change, bowel prep, morbid obesity         ROS comment: bmi 45  Garner's . Endo/Other:    (+) blood dyscrasia (ASA): anticoagulation therapy:., .                 Abdominal:   (+) obese,           Vascular: negative vascular ROS. Other Findings:             Anesthesia Plan      general and TIVA     ASA 3       Induction: intravenous. Anesthetic plan and risks discussed with patient.                         SHEFALI Bernal CRNA   8/16/2022

## 2022-08-16 NOTE — H&P
Patient Name: Tiffany Cerna  : 1970  MRN: [id number]  DATE: 22    Allergies: No Known Allergies     ENDOSCOPY  History and Physical    Procedure:    [] Diagnostic Colonoscopy       [] Screening Colonoscopy  [x] EGD      [] ERCP      [] EUS       [] Other    [x] Previous office notes/History and Physical reviewed from the patients chart. Please see EMR for further details of HPI. I have examined the patient's status immediately prior to the procedure and:      Indications/HPI:    []Abdominal Pain   [x]Barretts  []Screening/Surveillance   []History of Polyps  []Dysphagia            [] +Cologard/DNA testing  []Abnormal Imaging              []EOE Hx              [] Family Hx of CRC/Polyps  []Anemia                            []Food Impaction       []Recent Poor Prep  []GI Bleed             []Lymphadenopathy  []History of Polyps  []Change in bowel habits []Heartburn/Reflux  []Cancer- GI/Lung  []Chest Pain - Non Cardiac []Heme (+) Stool []Ulcers  []Constipation  []Hemoptysis  []Incontinence    []Diarrhea  []Hypoxemia  []Rectal Bleed (BRBPR)  []Nausea/Vomiting   [] Varices  []Crohns/Colitis  []Pancreatic Cyst   [] Cirrhosis   []Pancreatitis    []Abnormal MRCP  []Elevated LFT [] Stent Removal, Previous ERCP  []Other:     Anesthesia:   [x] MAC [] Moderate Sedation   [] General   [] None     ROS: 12 pt Review of Symptoms was negative unless mentioned above    Medications:   Prior to Admission medications    Medication Sig Start Date End Date Taking?  Authorizing Provider   lisinopril (PRINIVIL;ZESTRIL) 20 MG tablet Take 20 mg by mouth daily    Historical Provider, MD   pantoprazole (PROTONIX) 40 MG tablet Take 1 tablet by mouth 2 times daily 22   Anais Christensen MD   sucralfate (CARAFATE) 1 GM/10ML suspension Take 10 mLs by mouth 4 times daily 22   Anais Christensen MD   atorvastatin (LIPITOR) 20 MG tablet Take 20 mg by mouth daily 3/9/21   Historical Provider, MD   tiZANidine (ZANAFLEX) 4 MG capsule also    UPPER GASTROINTESTINAL ENDOSCOPY  03/06/2019    Dr Bin Bellamy [facility]-Distal esophagitis, gastritis (+) Garner's (-) dysplasia, 3 yr recall    UPPER GASTROINTESTINAL ENDOSCOPY N/A 09/23/2020    Dr Clarence Camacho(+) LSB, (+)LGD x 3 segments, 1 indeterminate for dysplasia, 4 segments w/o dysplasia, 6 month recall    UPPER GASTROINTESTINAL ENDOSCOPY  06/02/2021    Dr Bin Anna (+) LSB, (-) dysplasia, 6 month recall    UPPER GASTROINTESTINAL ENDOSCOPY  03/02/2022    Dr Tessa Cortez (+) Garner's    UPPER GASTROINTESTINAL ENDOSCOPY  08/23/2013    Dr Tres Ramos neg, Long seg Garner's, no dysplasia, gastritis, 1 yr recall    UPPER GASTROINTESTINAL ENDOSCOPY  09/03/2010    Dr Ike Samuels proximal esophageal stricture @ 21 cm, Long segment Garner's, no dysplasia, HH, 1 month recall    UPPER GASTROINTESTINAL ENDOSCOPY N/A 03/02/2022    Dr Tessa Cortez (+) Garner's, w/LGD x 2, (+) Garner's, indeterminant for dysplasia x 1, (+) Garner's, (-) dysplasia x 4, (+) Garner's, no dysplasia x 1, RFA recommended    UPPER GASTROINTESTINAL ENDOSCOPY N/A 06/21/2022    Dr Clarence Camacho-w/RFA #1-Repeat in 8 wks    VASECTOMY         Social History:  Social History     Tobacco Use    Smoking status: Former     Years: 1.00     Types: Cigarettes     Quit date: 9/23/2006     Years since quitting: 15.9    Smokeless tobacco: Never   Vaping Use    Vaping Use: Never used   Substance Use Topics    Alcohol use: Yes     Comment: occasional beer    Drug use: No       Vital Signs:   Vitals:    08/16/22 0829   BP: (!) 145/83   Pulse: 82   Resp: 16   Temp: 97.9 °F (36.6 °C)   SpO2: 96%        Physical Exam:  Cardiac:  [x]WNL  []Comments:  Pulmonary:  [x]WNL   []Comments:  Neuro/Mental Status:  [x]WNL  []Comments:  Abdominal:  [x]WNL    []Comments:  Other:   []WNL  []Comments:    Informed Consent:  The risks and benefits of the procedure have been discussed with either the patient or if they cannot consent, their representative. Assessment:  Patient examined and appropriate for planned sedation and procedure. Plan:  Proceed with planned sedation and procedure as above.          Sheba Lou MD

## 2022-08-16 NOTE — OP NOTE
Endoscopic Procedure Note    Patient: Isabelle Tijerina : 1970  Med Rec#: [id number] Acc#: [id number]     Primary Care Provider SHEFALI Syed - CNP  Referring Provider: Lizeth MEEHAN    Endoscopist: Carlos Blackburn MD    Date of Procedure:  2022    Procedure:   1. EGD with RFA #2    Indications:   1. Garner's Esophagus with dysplasia       Anesthesia:  Sedation was administered by anesthesia who monitored the patient during the procedure. Estimated Blood Loss: minimal    Procedure:   After reviewing the patient's chart and obtaining informed consent, the patient was placed in the left lateral decubitus position. A forward-viewing Olympus endoscope was lubricated and inserted through the mouth into the oropharynx. Under direct visualization, the upper esophagus was intubated. The scope was advanced to the level of the third portion of duodenum. Scope was slowly withdrawn with careful inspection of the mucosal surfaces. The scope was retroflexed for inspection of the gastric fundus and incisura. Findings and maneuvers are listed in impression below. The patient tolerated the procedure well. The scope was removed. There were no immediate complications. Findings:     Esophagus: abnormal: in the distal esophagus there was evidence approximately 14 cm of abnormal tissue. The gastric folds were measured at 36 cm from the incisors. The top of intestinal metaplasia appeared to be at approximately 22 cm. This appeared to be flat with no focal nodularity or ulceration. This had previously been biopsied consistent with LGD. The Garner's esophagus tissue was irrigated with Mucomyst 1% mixed with water. A guidewire was placed and the endoscope removed. Note-  there tissue at approximately 32cm looked particularly more inflamed than other tissue. A Barrx 360 express RFA balloon catheter was introduced over the guidewire.   The endoscope was introduced in a [id number] manner with the balloon catheter. The balloon electrode was positioned under direct visualization so that the proximal edge electrode slightly above the top of the distal metaplasia. The balloon was automatically inflated and energy applied at 230 W and 10 J/cm². The displayed inner diameter measurement was recorded. The electrode was moved distally 4 cm, aligning the proximal edge of electrode with the distal edge of the ablation zone. Inflation ablation was repeated until the top the gastric folds was reached. The balloon catheter was removed, cleaned and subsequently reintroduced after cleaning of ablation zone. The ablation zone was cleaned of coagulative debris with irrigation and suction using endoscope and cleaning. The balloon catheter was positioned under requisition so that the proximal edge of the electrode was at the proximal edge of the ablation zone. Inflation, ablation, and repositioning were repeated as in the first set of treatments. The balloon catheter and guidewire removed. Endoscopically complete ablation of the intestinal metaplasia was seen. Stomach:  normal       Duodenum: normal      IMPRESSION:  1. Garner's esophagus with LGD s/p RFA as above        RECOMMENDATIONS:    - Repeat EGD with ablation in 8 weeks  - PPI 40 mg twice daily for the next 3 months  - GI cocktail as needed  - Carafate slurry 3-4 times daily as needed  - Liquid diet for the next 24 hours, then advancing diet to soft diet for the next week as tolerated  - Avoid NSAIDs for the next week  - Please contact our office at [phone] if you develop significant chest pain, difficulty swallowing, fever, bleeding, abdominal pain, difficulty breathing, or vomiting. The results were discussed with the patient and family. A copy of the images obtained were given to the patient.      Olinda Espinal MD  8/16/2022  10:30 AM

## 2022-08-16 NOTE — TELEPHONE ENCOUNTER
Estelle Goodell,    Per Dr Halie Hu today:    IMPRESSION:  1. Garner's esophagus with LGD s/p RFA as above         RECOMMENDATIONS:    - Repeat EGD with ablation in 8 weeks  - PPI 40 mg twice daily for the next 3 months  - GI cocktail as needed  - Carafate slurry 3-4 times daily as needed  - Liquid diet for the next 24 hours, then advancing diet to soft diet for the next week as tolerated  - Avoid NSAIDs for the next week  - Please contact our office at [phone] if you develop significant chest pain, difficulty swallowing, fever, bleeding, abdominal pain, difficulty breathing, or vomiting. The results were discussed with the patient and family. A copy of the images obtained were given to the patient.      Fely Santo MD  8/16/2022  10:30 AM

## 2022-08-16 NOTE — DISCHARGE INSTRUCTIONS
RECOMMENDATIONS:    - Repeat EGD with ablation in 8 weeks  - PPI 40 mg twice daily for the next 3 months  - GI cocktail as needed  - Carafate slurry 3-4 times daily as needed  - Liquid diet for the next 24 hours, then advancing diet to soft diet for the next week as tolerated  - Avoid NSAIDs for the next week  - Please contact our office at [phone] if you develop significant chest pain, difficulty swallowing, fever, bleeding, abdominal pain, difficulty breathing, or vomiting. NSAIDS Non-steroidal Anti-Inflammatory  You have been directed by your physician to avoid any NSAID's; the following medications are a list of those to avoid. If you think that you are taking any NSAID's notify your physician. Over The Counter  Advil                      Motrin  Nuprin                   Ibuprofen  Midol                     Aleve  Naproxen              Orudis  Aspirin                   [place]    Prescriptions and Generics    Cataflam              Relafen  Voltaren               Clinoril  Indocin                 Naproxen  Arthrotec              Lodine  Daypro                 Nalfon  Toradol                Ansaid  Feldene               Meclofenamate  Fenoprofen          Ponstel  Mobic                   Celebrex  Vioxx     POST-OP ORDERS: ENDOSCOPY & COLONOSCOPY:    1. Rest today. 2. DO NOT eat or drink until wide awake. 3. DO NOT drive today. 4. Call physician if you have severe pain, vomiting, fever, rectal bleeding or black bowel movements. 5.  If a biopsy was taken or a polyp removed, you should expect to hear results in about 21 days. If you have heard nothing from your physician by then, call the office for results. 6.  Discharge home when patient awake, vitals signs stable and tolerating liquids.

## 2022-08-18 NOTE — TELEPHONE ENCOUNTER
Patient is scheduled for repeat EGD w/ ABLATION with Dr. Ema Mccabe at [facility] on [email or web address]. Patient is aware of appointment date and all instructions.

## 2022-09-14 DIAGNOSIS — M25.511 ACUTE PAIN OF RIGHT SHOULDER: ICD-10-CM

## 2022-09-14 RX ORDER — TIZANIDINE 4 MG/1
TABLET ORAL
Qty: 90 TABLET | Refills: 11 | Status: SHIPPED | OUTPATIENT
Start: 2022-09-14 | End: 2022-09-16 | Stop reason: SDUPTHER

## 2022-09-14 NOTE — TELEPHONE ENCOUNTER
Rx Refill Note  Requested Prescriptions     Pending Prescriptions Disp Refills   • tiZANidine (ZANAFLEX) 4 MG tablet [Pharmacy Med Name: TIZANIDINE HCL TABS 4MG] 90 tablet 11     Sig: TAKE 1 TABLET EVERY 8 HOURS AS NEEDED FOR MUSCLE SPASMS      Last office visit with prescribing clinician: 3/16/2022      Next office visit with prescribing clinician: 9/16/2022    Last refill: 3/16/2022     Jennie Solano MA  09/14/22, 10:52 CDT

## 2022-09-16 ENCOUNTER — OFFICE VISIT (OUTPATIENT)
Dept: FAMILY MEDICINE CLINIC | Facility: CLINIC | Age: 52
End: 2022-09-16

## 2022-09-16 VITALS
HEART RATE: 63 BPM | WEIGHT: 315 LBS | TEMPERATURE: 97.8 F | HEIGHT: 72 IN | OXYGEN SATURATION: 99 % | DIASTOLIC BLOOD PRESSURE: 78 MMHG | SYSTOLIC BLOOD PRESSURE: 114 MMHG | BODY MASS INDEX: 42.66 KG/M2 | RESPIRATION RATE: 20 BRPM

## 2022-09-16 VITALS
BODY MASS INDEX: 42.66 KG/M2 | TEMPERATURE: 97.8 F | DIASTOLIC BLOOD PRESSURE: 78 MMHG | SYSTOLIC BLOOD PRESSURE: 114 MMHG | OXYGEN SATURATION: 99 % | WEIGHT: 315 LBS | RESPIRATION RATE: 20 BRPM | HEIGHT: 72 IN | HEART RATE: 63 BPM

## 2022-09-16 DIAGNOSIS — E78.2 MIXED HYPERLIPIDEMIA: ICD-10-CM

## 2022-09-16 DIAGNOSIS — G47.33 OSA ON CPAP: ICD-10-CM

## 2022-09-16 DIAGNOSIS — I10 ESSENTIAL HYPERTENSION: Primary | ICD-10-CM

## 2022-09-16 DIAGNOSIS — R73.01 IMPAIRED FASTING GLUCOSE: ICD-10-CM

## 2022-09-16 DIAGNOSIS — M25.511 ACUTE PAIN OF RIGHT SHOULDER: ICD-10-CM

## 2022-09-16 DIAGNOSIS — N52.1 ERECTILE DYSFUNCTION DUE TO DISEASES CLASSIFIED ELSEWHERE: ICD-10-CM

## 2022-09-16 DIAGNOSIS — E66.01 MORBID (SEVERE) OBESITY DUE TO EXCESS CALORIES: ICD-10-CM

## 2022-09-16 DIAGNOSIS — Z00.01 ENCOUNTER FOR WELL ADULT EXAM WITH ABNORMAL FINDINGS: Primary | ICD-10-CM

## 2022-09-16 DIAGNOSIS — K22.70 BARRETT'S ESOPHAGUS WITHOUT DYSPLASIA: ICD-10-CM

## 2022-09-16 DIAGNOSIS — Z99.89 OSA ON CPAP: ICD-10-CM

## 2022-09-16 PROBLEM — M17.0 PRIMARY OSTEOARTHRITIS OF BOTH KNEES: Status: ACTIVE | Noted: 2022-09-16

## 2022-09-16 PROCEDURE — 99396 PREV VISIT EST AGE 40-64: CPT | Performed by: NURSE PRACTITIONER

## 2022-09-16 PROCEDURE — 99214 OFFICE O/P EST MOD 30 MIN: CPT | Performed by: NURSE PRACTITIONER

## 2022-09-16 RX ORDER — PANTOPRAZOLE SODIUM 40 MG/1
40 TABLET, DELAYED RELEASE ORAL DAILY
Qty: 90 TABLET | Refills: 1 | Status: SHIPPED | OUTPATIENT
Start: 2022-09-16 | End: 2022-10-12

## 2022-09-16 RX ORDER — TIZANIDINE 4 MG/1
4 TABLET ORAL EVERY 8 HOURS PRN
Qty: 90 TABLET | Refills: 1 | Status: SHIPPED | OUTPATIENT
Start: 2022-09-16 | End: 2023-03-15 | Stop reason: SDUPTHER

## 2022-09-16 RX ORDER — LISINOPRIL 20 MG/1
20 TABLET ORAL DAILY
Qty: 90 TABLET | Refills: 1 | Status: SHIPPED | OUTPATIENT
Start: 2022-09-16 | End: 2023-03-15 | Stop reason: SDUPTHER

## 2022-09-16 RX ORDER — ATORVASTATIN CALCIUM 20 MG/1
20 TABLET, FILM COATED ORAL DAILY
Qty: 90 TABLET | Refills: 1 | Status: SHIPPED | OUTPATIENT
Start: 2022-09-16 | End: 2023-03-15 | Stop reason: SDUPTHER

## 2022-09-16 NOTE — PATIENT INSTRUCTIONS
Obesity, Adult  Obesity is having too much body fat. Being obese means that your weight is more than what is healthy for you.  BMI is a number that explains how much body fat you have. If you have a BMI of 30 or more, you are obese. Obesity is often caused by eating or drinking more calories than your body uses. Changing your lifestyle can help you lose weight.  Obesity can cause serious health problems, such as:  Stroke.  Coronary artery disease (CAD).  Type 2 diabetes.  Some types of cancer, including cancers of the colon, breast, uterus, and gallbladder.  Osteoarthritis.  High blood pressure (hypertension).  High cholesterol.  Sleep apnea.  Gallbladder stones.  Infertility problems.  What are the causes?  Eating meals each day that are high in calories, sugar, and fat.  Being born with genes that may make you more likely to become obese.  Having a medical condition that causes obesity.  Taking certain medicines.  Sitting a lot (having a sedentary lifestyle).  Not getting enough sleep.  Drinking a lot of drinks that have sugar in them.  What increases the risk?  Having a family history of obesity.  Being an  woman.  Being a  man.  Living in an area with limited access to:  [place], recreation centers, or sidewalks.  Healthy food choices, such as grocery stores and drop.io markets.  What are the signs or symptoms?  The main sign is having too much body fat.  How is this treated?  Treatment for this condition often includes changing your lifestyle. Treatment may include:  Changing your diet. This may include making a healthy meal plan.  Exercise. This may include activity that causes your heart to beat faster (aerobic exercise) and strength training. Work with your doctor to design a program that works for you.  Medicine to help you lose weight. This may be used if you are not able to lose 1 pound a week after 6 weeks of healthy eating and more exercise.  Treating conditions that cause the  obesity.  Surgery. Options may include gastric banding and gastric bypass. This may be done if:  Other treatments have not helped to improve your condition.  You have a BMI of 40 or higher.  You have life-threatening health problems related to obesity.  Follow these instructions at home:  Eating and drinking    Follow advice from your doctor about what to eat and drink. Your doctor may tell you to:  Limit fast food, sweets, and processed snack foods.  Choose low-fat options. For example, choose low-fat milk instead of whole milk.  Eat 5 or more servings of fruits or vegetables each day.  Eat at home more often. This gives you more control over what you eat.  Choose healthy foods when you eat out.  Learn to read food labels. This will help you learn how much food is in 1 serving.  Keep low-fat snacks available.  Avoid drinks that have a lot of sugar in them. These include soda, fruit juice, iced tea with sugar, and flavored milk.  Drink enough water to keep your pee (urine) pale yellow.  Do not go on fad diets.    Physical activity  Exercise often, as told by your doctor. Most adults should get up to 150 minutes of moderate-intensity exercise every week.Ask your doctor:  What types of exercise are safe for you.  How often you should exercise.  Warm up and stretch before being active.  Do slow stretching after being active (cool down).  Rest between times of being active.  Lifestyle  Work with your doctor and a food expert (dietitian) to set a weight-loss goal that is best for you.  Limit your screen time.  Find ways to reward yourself that do not involve food.  Do not drink alcohol if:  Your doctor tells you not to drink.  You are pregnant, may be pregnant, or are planning to become pregnant.  If you drink alcohol:  Limit how much you use to:  0-1 drink a day for women.  0-2 drinks a day for men.  Be aware of how much alcohol is in your drink. In the [place] one drink equals one 12 oz bottle of beer (355 mL), one 5 oz  glass of wine (148 mL), or one 1½ oz glass of hard liquor (44 mL).  General instructions  Keep a weight-loss journal. This can help you keep track of:  The food that you eat.  How much exercise you get.  Take over-the-counter and prescription medicines only as told by your doctor.  Take vitamins and supplements only as told by your doctor.  Think about joining a support group.  Keep all follow-up visits as told by your doctor. This is important.  Contact a doctor if:  You cannot meet your weight loss goal after you have changed your diet and lifestyle for 6 weeks.  Get help right away if you:  Are having trouble breathing.  Are having thoughts of harming yourself.  Summary  Obesity is having too much body fat.  Being obese means that your weight is more than what is healthy for you.  Work with your doctor to set a weight-loss goal.  Get regular exercise as told by your doctor.  This information is not intended to replace advice given to you by your health care provider. Make sure you discuss any questions you have with your health care provider.  Document Revised: 08/22/2019 Document Reviewed: 08/22/2019  [employer] Patient Education © 2021 [employer]

## 2022-09-16 NOTE — PROGRESS NOTES
Chief Complaint   Patient presents with   • Annual Exam     Patient here for follow up.       Subjective    History of Present Illness:  Danny Vega is a 52 y.o. male who presents for an well adult visit.    Chronic problems include HTN stable with lisinopril without chest pain, shortness of breath or palpitations, hyperlipidemia stable with atorvastatin, GERD stable with pantoprazole, ED stable with silidenafil, obesity with 20 pound weight lost from last visit, and sleep apnea stable with CPAP        The following portions of the patient's history were reviewed and   updated as appropriate: allergies, current medications, past family history, past medical history, past social history, past surgical history and problem list.    Compared to one year ago, the patient feels his physical   health is better.    Compared to one year ago, the patient feels his mental   health is better.    Recent Hospitalizations:  He was not admitted to the hospital during the last year.       Current Medical Providers:  Patient Care Team:  Ronit Mccabe DNP, APRN as PCP - General  Ronit Mccabe DNP, APRN as PCP - Family Medicine    Outpatient Medications Prior to Visit   Medication Sig Dispense Refill   • aspirin 81 MG EC tablet Take 81 mg by mouth Daily.     • glucosamine sulfate 500 MG capsule capsule Take  by mouth Daily.     • Multiple Vitamins-Minerals (MULTIVITAMIN PO) Take  by mouth Daily.     • promethazine-dextromethorphan (PROMETHAZINE-DM) 6.25-15 MG/5ML syrup Take 5 mL by mouth 4 (Four) Times a Day As Needed for Cough. 120 mL 0   • sildenafil (VIAGRA) 100 MG tablet Take 1 tablet by mouth Daily As Needed for Erectile Dysfunction. 30 tablet 5   • atorvastatin (LIPITOR) 20 MG tablet Take 1 tablet by mouth Daily. 90 tablet 3   • lisinopril (PRINIVIL,ZESTRIL) 20 MG tablet Take 1 tablet by mouth Daily. 90 tablet 1   • pantoprazole (PROTONIX) 40 MG EC tablet Take 1 tablet by mouth Daily. (Patient taking  "differently: Take 40 mg by mouth 2 (Two) Times a Day.) 90 tablet 1   • tiZANidine (ZANAFLEX) 4 MG tablet TAKE 1 TABLET EVERY 8 HOURS AS NEEDED FOR MUSCLE SPASMS 90 tablet 11     No facility-administered medications prior to visit.       No opioid medication identified on active medication list. I have reviewed chart for other potential  high risk medication/s and harmful drug interactions in the elderly.          Aspirin is on active medication list. Aspirin use is indicated based on review of current medical condition/s. Pros and cons of this therapy have been discussed today. Benefits of this medication outweigh potential harm.  Patient has been encouraged to continue taking this medication.  .      Patient Active Problem List   Diagnosis   • Gastroesophageal reflux disease   • Somers's esophagus   • History of tics   • OMERO on CPAP   • Somres's esophagus   • Morbidly obese (HCC)   • Mixed hyperlipidemia   • Acute pain of right shoulder   • Essential hypertension   • Primary osteoarthritis of both knees     Advance Care Planning  Advance Directive is not on file.  ACP discussion was held with the patient during this visit. Patient has an advance directive (not in EMR), copy requested.    Review of Systems   Constitutional: Negative.    HENT: Negative.    Respiratory: Negative.    Cardiovascular: Negative.    Gastrointestinal: Negative.    Genitourinary: Negative.    Musculoskeletal: Negative.    Allergic/Immunologic: Negative.    Neurological: Negative.    Hematological: Negative.    Psychiatric/Behavioral: Negative.    All other systems reviewed and are negative.       Objective    Vitals:    09/16/22 0717   BP: 114/78   BP Location: Right arm   Patient Position: Sitting   Cuff Size: Adult   Pulse: 63   Resp: 20   Temp: 97.8 °F (36.6 °C)   TempSrc: Infrared   SpO2: 99%   Weight: (!) 145 kg (320 lb)   Height: 182.9 cm (72\")   PainSc: 0-No pain     Estimated body mass index is 43.4 kg/m² as calculated from the " "following:    Height as of this encounter: 182.9 cm (72\").    Weight as of this encounter: 145 kg (320 lb).    Class 3 Severe Obesity (BMI >=40). Obesity-related health conditions include the following: hypertension, dyslipidemias and GERD. Obesity is improving with lifestyle modifications. BMI is is above average; BMI management plan is completed. We discussed portion control and increasing exercise.      Does the patient have evidence of cognitive impairment? No    Physical Exam  Vitals and nursing note reviewed.   Constitutional:       General: He is awake.      Appearance: Normal appearance. He is well-developed and well-groomed.   HENT:      Head: Normocephalic and atraumatic.      Right Ear: Hearing, tympanic membrane, ear canal and external ear normal.      Left Ear: Hearing, tympanic membrane, ear canal and external ear normal.      Nose: Nose normal.      Mouth/Throat:      Lips: Pink.      Pharynx: Oropharynx is clear.   Eyes:      General: Lids are normal.      Conjunctiva/sclera: Conjunctivae normal.   Cardiovascular:      Rate and Rhythm: Normal rate and regular rhythm.      Heart sounds: Normal heart sounds.   Pulmonary:      Effort: Pulmonary effort is normal.      Breath sounds: Normal breath sounds and air entry.   Musculoskeletal:      Cervical back: Full passive range of motion without pain.      Right lower leg: No edema.      Left lower leg: No edema.   Lymphadenopathy:      Head:      Right side of head: No submental, submandibular or tonsillar adenopathy.      Left side of head: No submental, submandibular or tonsillar adenopathy.   Skin:     General: Skin is warm and dry.   Neurological:      Mental Status: He is alert and oriented to person, place, and time.      Sensory: Sensation is intact.      Motor: Motor function is intact.      Coordination: Coordination is intact.      Gait: Gait is intact.   Psychiatric:         Attention and Perception: Attention and perception normal.         Mood " and Affect: Mood and affect normal.         Speech: Speech normal.         Behavior: Behavior normal. Behavior is cooperative.         Thought Content: Thought content normal.         Cognition and Memory: Cognition and memory normal.         Judgment: Judgment normal.         HEALTH RISK ASSESSMENT    Smoking Status:  Social History     Tobacco Use   Smoking Status Former Smoker   • Packs/day: 1.00   • Years: 2.00   • Pack years: 2.00   • Types: Cigarettes, Cigars   • Start date:    • Quit date:    • Years since quittin.7   Smokeless Tobacco Never Used   Tobacco Comment    Longtime snuff user     Alcohol Consumption:  Social History     Substance and Sexual Activity   Alcohol Use Yes   Fall Risk Screen:    STEADI Fall Risk Assessment has not been completed.    Depression Screening:  PHQ-2/PHQ-9 Depression Screening 2022   Retired Total Score -   Little Interest or Pleasure in Doing Things 0-->not at all   Feeling Down, Depressed or Hopeless 0-->not at all   PHQ-9: Brief Depression Severity Measure Score 0       Health Habits and Functional and Cognitive Screening:  No flowsheet data found.    Age-appropriate Screening Schedule:  Refer to the list below for future screening recommendations based on patient's age, sex and/or medical conditions. Orders for these recommended tests are listed in the plan section. The patient has been provided with a written plan.    Health Maintenance   Topic Date Due   • TDAP/TD VACCINES (1 - Tdap) 2023 (Originally 1989)   • ZOSTER VACCINE (1 of 2) 2023 (Originally 2020)   • LIPID PANEL  2023   • INFLUENZA VACCINE  Discontinued              Assessment & Plan    Diagnoses and all orders for this visit:    1. Encounter for well adult exam with abnormal findings (Primary)    2. Morbid (severe) obesity due to excess calories (HCC)    Patient's (Body mass index is 43.4 kg/m².) indicates that they are morbidly obese (BMI > 40 or > 35 with obesity  - related health condition) with health related conditions that include obstructive sleep apnea, hypertension, dyslipidemias and GERD . Weight is improving with lifestyle modifications. BMI is is above average; BMI management plan is completed. We discussed portion control and increasing exercise.      Continue all meds as prescribed      Health Maintenance Summary    Postponed - HEPATITIS C SCREENING; (Once); Postponed until 3/16/2023  03/16/2022  Postponed until 3/16/2023 by Ronit Mccabe DNP, APRN (Patient Refused)    03/09/2021  Postponed until 4/21/2021 by Ronit Mccabe DNP, APRN (Pending event)      Postponed - TDAP/TD VACCINES; (1 - Tdap); Postponed until 3/16/2023  03/16/2022  Postponed until 3/16/2023 by Ronit Mccabe DNP, APRN (Patient Refused)    03/26/2021  Postponed until 3/26/2021 by Ronit Mccabe DNP, APRN (Patient Refused)    03/09/2021  Postponed until 3/9/2021 by Ronit Mccabe DNP, APRN (Patient Refused)    10/15/2019  Postponed until 10/15/2019 by Ronit Mccabe DNP, APRN (Patient Refused)    12/06/2017  Postponed until 1/19/2018 by Margaret Jha MA (Patient Refused)      Postponed - ZOSTER VACCINE; (1 of 2); Postponed until 3/16/2023  03/16/2022  Postponed until 3/16/2023 by Ronit Mccabe DNP, APRN (Pending event)    03/09/2021  Postponed until 3/9/2021 by Ronit Mccabe DNP, APRN (Patient Refused)      Ordered - LIPID PANEL; (Yearly); Ordered on 9/16/2022 03/16/2022  Lipid panel    09/17/2021  Lipid panel    03/10/2021  Lipid panel    10/15/2019  Done    10/15/2019  Lipid panel         ANNUAL PHYSICAL; (Yearly); Next due on 9/17/2023 09/16/2022  Done    10/15/2019  Done    10/15/2019  Registry Metric: Last Annual Physical      COLORECTAL CANCER SCREENING; (COLONOSCOPY - Every 10 Years); Next due on 9/24/2030 09/24/2020  COLONOSCOPY (Done)    09/23/2020  Outside Procedure: [place] COLONOSCOPY FLX DX W/COLLJ SPEC WHEN PFRMD      Pneumococcal Vaccine  "0-64; (Series Information);   No completion, postpone, frequency change, or communication history exists for this topic.     Discontinued - COVID-19 Vaccine; Discontinued  09/16/2022  Frequency changed to Never by Ronit Mccabe DNP, APRN (states, \"I will not take another covid vaccinations)    11/24/2021  Imm Admin: COVID-19 (DOROTHY)    04/01/2021  Imm Admin: COVID-19 (DOROTHY)      Discontinued - INFLUENZA VACCINE; discontinued  12/12/2018  Frequency changed to Never by Ronit Mccabe DNP, APRN (Does not take vaccines)    12/06/2017  Declined    11/04/2013  Imm Admin: Influenza, Unspecified    11/04/2013  Imm Admin: Influenza TIV (IM)        HEALTH PROMOTION/ PREVENTION  I discussed and encouraged age appropriate health promotion/ prevention screenings and immunizations specific to this client: pneumococcal vaccine, zoster vaccine, Tdap, annual wellness, lipid panel, hepatitis C screening, Hg A1C,  colonoscopy, and low dose CT of chest; the patient declines these recommendations at this time.         The above risks/problems have been discussed with the patient.  Follow up actions/plans if indicated are seen below in the Assessment/Plan Section.  Pertinent information has been shared with the patient in the After Visit Summary.      Follow Up:   Return in about 1 year (around 9/16/2023) for Annual physical.     An After Visit Summary and PPPS were made available to the patient.                     "

## 2022-09-16 NOTE — PROGRESS NOTES
Answers for HPI/ROS submitted by the patient on 9/9/2022  What is the primary reason for your visit?: Physical    Chief Complaint  Hypertension (Patient here for follow up. )    Subjective        Danny Vega presents to [facility]  History of Present Illness  Present for follow up for chronic problems of HTN stable with lisinopril, without chest pain, shortness of breath or palpitations, gerd stable pantoprazole, and had the ablation technique and says it is really working, ED, and muscle aches and spasms.   Denies any fever, chills, nausea, vomiting or diarrhea.  Tells me that he is not getting a covid booster  Hypertension  This is a chronic problem. The current episode started more than 1 year ago. The problem has been rapidly improving since onset. The problem is controlled. Pertinent negatives include no blurred vision, chest pain, headaches, neck pain, orthopnea, PND or shortness of breath. There are no associated agents to hypertension. Risk factors for coronary artery disease include dyslipidemia, family history, male gender and obesity. Past treatments include ACE inhibitors. Current antihypertension treatment includes ACE inhibitors. The current treatment provides mild improvement. There are no compliance problems.  There is no history of kidney disease, heart failure or retinopathy.   Hyperlipidemia  This is a chronic problem. The current episode started more than 1 year ago. The problem is uncontrolled. Recent lipid tests were reviewed and are high. Exacerbating diseases include obesity. He has no history of diabetes or liver disease. There are no known factors aggravating his hyperlipidemia. Pertinent negatives include no chest pain or shortness of breath. Current antihyperlipidemic treatment includes statins. The current treatment provides mild improvement of lipids. There are no compliance problems.  Risk factors for coronary artery disease include dyslipidemia,  family history, hypertension, male sex and obesity.   Heartburn  He complains of heartburn. He reports no chest pain, no sore throat or no stridor. This is a chronic problem. The problem occurs occasionally. The problem has been gradually improving. The heartburn duration is less than a minute. The heartburn is located in the substernum. The heartburn does not wake him from sleep. The heartburn does not limit his activity. The heartburn doesn't change with position. The symptoms are aggravated by certain foods. Pertinent negatives include no anemia, fatigue, melena or muscle weakness. Risk factors include Somers's esophagus and caffeine use. He has tried an antacid and a PPI for the symptoms. The treatment provided mild relief. Past procedures do not include an abdominal ultrasound, an EGD, esophageal manometry, esophageal pH monitoring or H. pylori antibody titer. Past invasive treatments do not include gastroplasty or gastroplication.   Obesity  This is a chronic problem. The current episode started more than 1 year ago. The problem occurs constantly. The problem has been gradually improving. Pertinent negatives include no chest pain, fatigue, headaches, joint swelling, neck pain, numbness, sore throat, swollen glands, urinary symptoms or vertigo. Nothing aggravates the symptoms. He has tried nothing for the symptoms. The treatment provided mild relief.   Erectile Dysfunction  This is a chronic problem. The problem has been gradually improving since onset. The nature of his difficulty is achieving erection. He reports no decreased libido or performance anxiety. He reports his erection duration to be less than 1 minute. Obstructive symptoms include dribbling. Nothing aggravates the symptoms. The treatment provided mild relief. He has been using treatment for 1 to 2 years. He has had no adverse reactions caused by medications. Risk factors include hypertension.   Shoulder Injury   The incident occurred at home (no  "injury ). Both shoulders are affected. There was no injury mechanism. The quality of the pain is described as aching and burning. The pain does not radiate. The pain is at a severity of 3/10. The pain is mild. Pertinent negatives include no chest pain, numbness or tingling. The symptoms are aggravated by movement and overhead lifting. He has tried rest and acetaminophen for the symptoms. The treatment provided mild relief.   Answers for HPI/ROS submitted by the patient on 9/9/2022  What is the primary reason for your visit?: Physical    The following portions of the patient's history were reviewed and updated as appropriate: allergies, current medications, past family history, past medical history, past social history, past surgical history and problem list.      Objective   Vital Signs:  /78 (BP Location: Right arm, Patient Position: Sitting, Cuff Size: Adult)   Pulse 63   Temp 97.8 °F (36.6 °C) (Infrared)   Resp 20   Ht 182.9 cm (72\")   Wt (!) 145 kg (320 lb 11.2 oz)   SpO2 99%   BMI 43.49 kg/m²   Estimated body mass index is 43.49 kg/m² as calculated from the following:    Height as of this encounter: 182.9 cm (72\").    Weight as of this encounter: 145 kg (320 lb 11.2 oz).        Physical Exam  Vitals and nursing note reviewed.   Constitutional:       General: He is awake.      Appearance: Normal appearance. He is well-developed and well-groomed.   HENT:      Head: Normocephalic and atraumatic.      Right Ear: Hearing, tympanic membrane, ear canal and external ear normal.      Left Ear: Hearing, tympanic membrane, ear canal and external ear normal.      Nose: Nose normal.      Mouth/Throat:      Lips: Pink.      Pharynx: Oropharynx is clear.   Eyes:      General: Lids are normal.      Conjunctiva/sclera: Conjunctivae normal.   Cardiovascular:      Rate and Rhythm: Normal rate and regular rhythm.      Heart sounds: Normal heart sounds.   Pulmonary:      Effort: Pulmonary effort is normal.      Breath " sounds: Normal breath sounds and air entry.   Musculoskeletal:      Cervical back: Full passive range of motion without pain.      Right lower leg: No edema.      Left lower leg: No edema.   Lymphadenopathy:      Head:      Right side of head: No submental, submandibular or tonsillar adenopathy.      Left side of head: No submental, submandibular or tonsillar adenopathy.   Skin:     General: Skin is warm and dry.   Neurological:      Mental Status: He is alert and oriented to person, place, and time.      Sensory: Sensation is intact.      Motor: Motor function is intact.      Coordination: Coordination is intact.      Gait: Gait is intact.   Psychiatric:         Attention and Perception: Attention and perception normal.         Mood and Affect: Mood and affect normal.         Speech: Speech normal.         Behavior: Behavior normal. Behavior is cooperative.         Thought Content: Thought content normal.         Cognition and Memory: Cognition and memory normal.         Judgment: Judgment normal.        Result Review :                Assessment and Plan   Diagnoses and all orders for this visit:    1. Essential hypertension (Primary)  -     lisinopril (PRINIVIL,ZESTRIL) 20 MG tablet; Take 1 tablet by mouth Daily.  Dispense: 90 tablet; Refill: 1  -     CBC (No Diff)  -     Comprehensive metabolic panel    2. Mixed hyperlipidemia  -     atorvastatin (LIPITOR) 20 MG tablet; Take 1 tablet by mouth Daily.  Dispense: 90 tablet; Refill: 1  -     Lipid panel    3. Somers's esophagus without dysplasia  -     pantoprazole (PROTONIX) 40 MG EC tablet; Take 1 tablet by mouth Daily.  Dispense: 90 tablet; Refill: 1    4. Acute pain of right shoulder  -     tiZANidine (ZANAFLEX) 4 MG tablet; Take 1 tablet by mouth Every 8 (Eight) Hours As Needed for Muscle Spasms.  Dispense: 90 tablet; Refill: 1    5. Impaired fasting glucose  -     Hemoglobin A1c    6. Erectile dysfunction due to diseases classified elsewhere       -     Continue sildenafil as prescribed, has refills does not need today     7. Morbid (severe) obesity due to excess calories (HCC)      -   Has lost 20 pounds since last visit, continue eating healthy and exercising.  Try to lose 3 pounds before next visit       Class 3 Severe Obesity (BMI >=40). Obesity-related health conditions include the following: hypertension, dyslipidemias and GERD. Obesity is improving with      lifestyle modifications. BMI is is above average; BMI management plan is completed. We discussed portion control and increasing exercise.           Follow Up   Return in about 6 months (around 3/16/2023) for Recheck chronic problems .  Patient was given instructions and counseling regarding his condition or for health maintenance advice. Please see specific information pulled into the AVS if appropriate.     Electronically signed by Ronit Mccabe DNP, APRN, 09/16/22, 7:55 AM CDT.

## 2022-09-17 LAB
ALBUMIN SERPL-MCNC: 4.5 G/DL (ref 3.5–5.2)
ALBUMIN/GLOB SERPL: 1.7 G/DL
ALP SERPL-CCNC: 70 U/L (ref 39–117)
ALT SERPL-CCNC: 28 U/L (ref 1–41)
AST SERPL-CCNC: 24 U/L (ref 1–40)
BILIRUB SERPL-MCNC: 0.4 MG/DL (ref 0–1.2)
BUN SERPL-MCNC: 13 MG/DL (ref 6–20)
BUN/CREAT SERPL: 13.1 (ref 7–25)
CALCIUM SERPL-MCNC: 9.7 MG/DL (ref 8.6–10.5)
CHLORIDE SERPL-SCNC: 104 MMOL/L (ref 98–107)
CHOLEST SERPL-MCNC: 114 MG/DL (ref 0–200)
CO2 SERPL-SCNC: 28.7 MMOL/L (ref 22–29)
CREAT SERPL-MCNC: 0.99 MG/DL (ref 0.76–1.27)
EGFRCR SERPLBLD CKD-EPI 2021: 91.7 ML/MIN/1.73
ERYTHROCYTE [DISTWIDTH] IN BLOOD BY AUTOMATED COUNT: 13.6 % (ref 12.3–15.4)
GLOBULIN SER CALC-MCNC: 2.6 GM/DL
GLUCOSE SERPL-MCNC: 110 MG/DL (ref 65–99)
HBA1C MFR BLD: 6.2 % (ref 4.8–5.6)
HCT VFR BLD AUTO: 44 % (ref 37.5–51)
HDLC SERPL-MCNC: 38 MG/DL (ref 40–60)
HGB BLD-MCNC: 14.7 G/DL (ref 13–17.7)
LDLC SERPL CALC-MCNC: 52 MG/DL (ref 0–100)
MCH RBC QN AUTO: 29.6 PG (ref 26.6–33)
MCHC RBC AUTO-ENTMCNC: 33.4 G/DL (ref 31.5–35.7)
MCV RBC AUTO: 88.7 FL (ref 79–97)
PLATELET # BLD AUTO: 253 10*3/MM3 (ref 140–450)
POTASSIUM SERPL-SCNC: 5.1 MMOL/L (ref 3.5–5.2)
PROT SERPL-MCNC: 7.1 G/DL (ref 6–8.5)
RBC # BLD AUTO: 4.96 10*6/MM3 (ref 4.14–5.8)
SODIUM SERPL-SCNC: 142 MMOL/L (ref 136–145)
TRIGL SERPL-MCNC: 136 MG/DL (ref 0–150)
VLDLC SERPL CALC-MCNC: 24 MG/DL (ref 5–40)
WBC # BLD AUTO: 8.87 10*3/MM3 (ref 3.4–10.8)

## 2022-10-12 DIAGNOSIS — K22.70 BARRETT'S ESOPHAGUS WITHOUT DYSPLASIA: ICD-10-CM

## 2022-10-12 RX ORDER — PANTOPRAZOLE SODIUM 40 MG/1
40 TABLET, DELAYED RELEASE ORAL 2 TIMES DAILY
Qty: 180 TABLET | Refills: 1 | Status: SHIPPED | OUTPATIENT
Start: 2022-10-12 | End: 2022-10-24 | Stop reason: SDUPTHER

## 2022-10-12 NOTE — TELEPHONE ENCOUNTER
Patient states he takes Pantoprazole twice daily and last rx was sent for once daily.  He is asking if we can send new script to [facility].

## 2022-10-13 NOTE — TELEPHONE ENCOUNTER
6/27/2022: Gastroenterology Clinic Visit  81 year old with history of well differentiated adenocarcinoma of the stomach in 2015 s/p treatment with radiation/chemotherapy and partial gastrectomy, hypertension, anxiety, dementia, personal history of colon polyps, hyperlipidemia, bladder cancer s/p treatment, former tobacco use, GERD, CKD, diverticulosis who presents for evaluation of 6 month history of abdominal discomfort.  Pain can start in the neck and radiate to the abdomen. Evaluated by Cardiology who performed Cardiac evaluation which did not reveal a cardiac source. Pain can occur without any specific trigger and lasts several minutes before going away on its own. Frequency and severity has decreased over the past 6 months. Denies a particular body movement that triggered the symptoms.   Denies unintentional weight loss, melena, hematochezia. Has 1-3 bowel movements per day.  Most recent colonoscopy in 2015 as noted below. Most recent EGD in 2019 as noted below.   Retired but previously worked in [employer] as .   Please see below for most recent colonoscopy and EGD.  2/25/2015: Colonoscopy  Single small sessile polyp was found in the ascending colon. Polypectomy performed. Single diminutive sessile polyp was found in the cecum. Polypectomy performed. A few areas of angioectasia/AVM in the cecum. Moderately severe diverticulosis in the sigmoid colon. Appendiceal orifice not seen due to large amount of debris.   2/25/2015: Pathology from EGD Ascending Colon Polyp: Adenomatous polyp. Cecal Polyp: Adenomatous polyp.  4/9/2019: EGD Examined esophagus was normal. Evidence of a patient Billroth II gastroduodenostomy was found. This was traversed. The efferent limb was examined. The afferent limb was examined. The mucosa of both limbs were normal. Biopsies were taken of the stomach. The exam of the stomach was otherwise normal.  The cardia and gastric fundus were normal on retroflexion.  4/9/2019: Pathology from EGD  Reactive gastropathy with epithelial reparative changes suggestive of healing erosion/ulcer.  7/15/2022: CT Abdomen and Pelvis with Contrast  IMPRESSION:  Diffuse gallbladder wall enhancement that is nonspecific, but may related to cholecystitis. There is no associated pericholecystic fluid or biliary duct dilatation. Further assessment with gallbladder is recommended as clinically indicated. Status post prior partial gastric resection and Billroth II anastomosis. There is deformity and narrowing of the proximal celiac artery suggesting possible underlying median arcuate ligament. There is no significant narrowing involving the proximal superior mesenteric and/or inferior mesenteric arteries.  8/30/2022: EGD - Tertiary peristaltic waves are noted. The esophageal mucosa was otherwise normal. - The Z-line was regular and was found 38 cm from the incisors. - A small hiatal hernia was present. - Evidence of a patent Billroth II gastrojejunostomy was found. The gastrojejunal anastomosis was characterized by congestion, edema, and erythema. Biopsies were taken of the anastomosis. with a cold forceps for histology. Estimated blood loss was minimal. This was traversed. The efferent limb was examined and was normal. The afferent limb was examined and was normal. Biopsies were taken with a cold forceps for histology of the small intestine. - Erythematous mucosa without bleeding was found in the stomach. Biopsies were taken throughout the entire examined stomach with cold biopsy forceps for histology given prior history of gastric cancer. Estimated blood loss was minimal. - The cardia and gastric fundus were normal on retroflexion. 8/30/2022: Pathology from EGD A.  DUODENUM, BIOPSY:                        NO PATHOLOGIC CHANGE B.  GASTRIC ANASTOMOSIS, BIOPSY:               DUODENAL MUCOSA WITH MILD ACUTE DUODENITIS;  GASTRIC MUCOSA WITH MILD CHRONIC ACTIVE GASTRITIS AND INTESTINAL METAPLASIA;IMMUNOSTAIN NEGATIVE FOR HELICOBACTER; NEGATIVE FOR MALIGNANCY C.  STOMACH, RANDOM BIOPSIES:                       MILD CHRONIC GASTRITIS, FAVOR CHEMICAL; NEGATIVE FOR MALIGNANCY 9/21/2022: MRI Abdomen and Pelvis IMPRESSION: 1. There is minimal sludge versus tiny stones in the gallbladder lumen. The gallbladder is otherwise normal without signs of inflammation. There is no biliary ductal dilatation. 2. 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 There are several tiny perfusion anomalies in the liver is small.  10/13/2022: Gastroenterology Follow-Up Visit Mr. Campbell notes his abdominal discomfort has been gradually improving although still persist at times. Denies melena, hematochezia, unintentional weight loss. IMPRESSION:  1. Garner's Esophagus s/p RFA     RECOMMENDATIONS:       - Repeat EGD in 8 weeks for RFA #2  - PPI 40 mg twice daily for the next 3 months  - GI cocktail as needed  - Carafate slurry 3-4 times daily as needed  - Liquid diet for the next 24 hours, then advancing diet to soft diet for the next week as tolerated  - Avoid NSAIDs for the next week  - Please contact our office at [phone] if you develop significant chest pain, difficulty swallowing, fever, bleeding, abdominal pain, difficulty breathing, or vomiting.            The results were discussed with the patient and family. A copy of the images obtained were given to the patient.      Edmundo Erazo MD  6/21/2022  9:09 AM      Egd/RFA per  today, see above. No FU scheduled. Repeat Egd in 8 weeks pending. Patient's wife Frantz Bond called today from [phone], she is on HIP[employer] stating Zebedee Fire has increased discomfort from his chest up into his throat from the Egd/RFA today, feels like something is stuck in his throat, on pain scale he is at a 7-10 and is asking for the GI Cocktail Rx that was mentioned today and maybe a pain medication. [address] was mentioned but I was not able to get through to them, they are having phone trouble at present. I spoke to  and the Rx was then sent to [facility] by phone with [facility] Reyes Pharmacist for GI Cocktail with Maalox 60 ml, Viscous Lidocaine 60 ml and Levsin Elixir 60 ml SIG: 10 ml po every 6 hours prn discomfort # 180 ml x 1 RF, also  sent Rx for Ultram 50 mg 1 every 6 hours prn pain # 12 x 0 refills. Patient will remain on a liquid diet for 24 hours and then advance to a soft diet as instructed by  post procedure today. Patient will call if he has any additional issues per the wife Frantz Bond. All the above discussed with .  [facility]

## 2022-10-24 DIAGNOSIS — K22.70 BARRETT'S ESOPHAGUS WITHOUT DYSPLASIA: ICD-10-CM

## 2022-10-24 RX ORDER — PANTOPRAZOLE SODIUM 40 MG/1
40 TABLET, DELAYED RELEASE ORAL 2 TIMES DAILY
Qty: 180 TABLET | Refills: 1 | Status: SHIPPED | OUTPATIENT
Start: 2022-10-24 | End: 2023-03-15 | Stop reason: SDUPTHER

## 2022-10-24 NOTE — TELEPHONE ENCOUNTER
Called pt back to inquire on questions- pt reports that [facility]ole needs [facility] go [facility] not [facility].     Called [facility] to cancel rx- cancelled.     Med pended for BID to [facility].

## 2022-10-24 NOTE — TELEPHONE ENCOUNTER
Danny called and would like a nurse to call him back by the end of the day. He just has some questions.

## 2022-10-28 ENCOUNTER — TELEPHONE (OUTPATIENT)
Dept: GASTROENTEROLOGY | Age: 52
End: 2022-10-28

## 2022-10-28 ENCOUNTER — HOSPITAL ENCOUNTER (OUTPATIENT)
Age: 52
Setting detail: OUTPATIENT SURGERY
Discharge: HOME | End: 2022-10-28
Attending: INTERNAL MEDICINE | Admitting: INTERNAL MEDICINE
Payer: COMMERCIAL

## 2022-10-28 ENCOUNTER — ANESTHESIA (OUTPATIENT)
Dept: ENDOSCOPY | Age: 52
End: 2022-10-28
Payer: COMMERCIAL

## 2022-10-28 ENCOUNTER — ANESTHESIA EVENT (OUTPATIENT)
Dept: ENDOSCOPY | Age: 52
End: 2022-10-28
Payer: COMMERCIAL

## 2022-10-28 VITALS
BODY MASS INDEX: 42.66 KG/M2 | OXYGEN SATURATION: 98 % | SYSTOLIC BLOOD PRESSURE: 160 MMHG | DIASTOLIC BLOOD PRESSURE: 88 MMHG | HEART RATE: 86 BPM | HEIGHT: 72 IN | WEIGHT: 315 LBS | RESPIRATION RATE: 20 BRPM | TEMPERATURE: 97.2 F

## 2022-10-28 PROCEDURE — 43270 EGD LESION ABLATION: CPT | Performed by: INTERNAL MEDICINE

## 2022-10-28 PROCEDURE — 2500000003 HC RX 250 WO HCPCS: Performed by: NURSE ANESTHETIST, CERTIFIED REGISTERED

## 2022-10-28 PROCEDURE — 2580000003 HC RX 258: Performed by: INTERNAL MEDICINE

## 2022-10-28 PROCEDURE — C1769 GUIDE WIRE: HCPCS | Performed by: INTERNAL MEDICINE

## 2022-10-28 PROCEDURE — 6360000002 HC RX W HCPCS: Performed by: NURSE ANESTHETIST, CERTIFIED REGISTERED

## 2022-10-28 PROCEDURE — 3700000001 HC ADD 15 MINUTES (ANESTHESIA): Performed by: INTERNAL MEDICINE

## 2022-10-28 PROCEDURE — 2709999900 HC NON-CHARGEABLE SUPPLY: Performed by: INTERNAL MEDICINE

## 2022-10-28 PROCEDURE — C1888 ENDOVAS NON-CARDIAC ABL CATH: HCPCS | Performed by: INTERNAL MEDICINE

## 2022-10-28 PROCEDURE — 7100000010 HC PHASE II RECOVERY - FIRST 15 MIN: Performed by: INTERNAL MEDICINE

## 2022-10-28 PROCEDURE — 6360000002 HC RX W HCPCS: Performed by: INTERNAL MEDICINE

## 2022-10-28 PROCEDURE — 7100000011 HC PHASE II RECOVERY - ADDTL 15 MIN: Performed by: INTERNAL MEDICINE

## 2022-10-28 PROCEDURE — 6370000000 HC RX 637 (ALT 250 FOR IP): Performed by: INTERNAL MEDICINE

## 2022-10-28 PROCEDURE — 3609012400 HC EGD TRANSORAL BIOPSY SINGLE/MULTIPLE: Performed by: INTERNAL MEDICINE

## 2022-10-28 PROCEDURE — 3700000000 HC ANESTHESIA ATTENDED CARE: Performed by: INTERNAL MEDICINE

## 2022-10-28 RX ORDER — DEXAMETHASONE SODIUM PHOSPHATE 10 MG/ML
INJECTION, SOLUTION INTRAMUSCULAR; INTRAVENOUS PRN
Status: DISCONTINUED | OUTPATIENT
Start: 2022-10-28 | End: 2022-10-28 | Stop reason: SDUPTHER

## 2022-10-28 RX ORDER — PROPOFOL 10 MG/ML
INJECTION, EMULSION INTRAVENOUS PRN
Status: DISCONTINUED | OUTPATIENT
Start: 2022-10-28 | End: 2022-10-28 | Stop reason: SDUPTHER

## 2022-10-28 RX ORDER — ONDANSETRON 2 MG/ML
INJECTION INTRAMUSCULAR; INTRAVENOUS PRN
Status: DISCONTINUED | OUTPATIENT
Start: 2022-10-28 | End: 2022-10-28 | Stop reason: SDUPTHER

## 2022-10-28 RX ORDER — ACETYLCYSTEINE 200 MG/ML
SOLUTION ORAL; RESPIRATORY (INHALATION) PRN
Status: DISCONTINUED | OUTPATIENT
Start: 2022-10-28 | End: 2022-10-28 | Stop reason: HOSPADM

## 2022-10-28 RX ORDER — SODIUM CHLORIDE, SODIUM LACTATE, POTASSIUM CHLORIDE, CALCIUM CHLORIDE 600; 310; 30; 20 MG/100ML; MG/100ML; MG/100ML; MG/100ML
INJECTION, SOLUTION INTRAVENOUS CONTINUOUS
Status: DISCONTINUED | OUTPATIENT
Start: 2022-10-28 | End: 2022-10-28 | Stop reason: ALTCHOICE

## 2022-10-28 RX ORDER — LIDOCAINE HYDROCHLORIDE 10 MG/ML
INJECTION, SOLUTION EPIDURAL; INFILTRATION; INTRACAUDAL; PERINEURAL PRN
Status: DISCONTINUED | OUTPATIENT
Start: 2022-10-28 | End: 2022-10-28 | Stop reason: SDUPTHER

## 2022-10-28 RX ADMIN — Medication: at 11:13

## 2022-10-28 RX ADMIN — LIDOCAINE HYDROCHLORIDE 30 MG: 10 INJECTION, SOLUTION EPIDURAL; INFILTRATION; INTRACAUDAL; PERINEURAL at 10:29

## 2022-10-28 RX ADMIN — PROPOFOL 400 MG: 10 INJECTION, EMULSION INTRAVENOUS at 10:29

## 2022-10-28 RX ADMIN — SODIUM CHLORIDE, POTASSIUM CHLORIDE, SODIUM LACTATE AND CALCIUM CHLORIDE: 600; 310; 30; 20 INJECTION, SOLUTION INTRAVENOUS at 09:46

## 2022-10-28 RX ADMIN — DEXAMETHASONE SODIUM PHOSPHATE 10 MG: 10 INJECTION, SOLUTION INTRAMUSCULAR; INTRAVENOUS at 10:36

## 2022-10-28 RX ADMIN — ONDANSETRON 4 MG: 2 INJECTION INTRAMUSCULAR; INTRAVENOUS at 10:40

## 2022-10-28 ASSESSMENT — PAIN - FUNCTIONAL ASSESSMENT: PAIN_FUNCTIONAL_ASSESSMENT: 0-10

## 2022-10-28 NOTE — ANESTHESIA POSTPROCEDURE EVALUATION
Department of Anesthesiology  Postprocedure Note    Patient: Brad Bonilla  MRN: [id number]  YOB: 1970  Date of evaluation: 10/28/2022      Procedure Summary     Date: 10/28/22 Room / Location: [address]    Anesthesia Start: 3670 Anesthesia Stop: 0262    Procedure: EGD w/RFA (Abdomen) Diagnosis:       Garner's esophagus with low grade dysplasia      (Garner's esophagus with low grade dysplasia [K22.710])    Surgeons: Chinyere Mckeon MD Responsible Provider: SHEFALI Payne CRNA    Anesthesia Type: general, TIVA ASA Status: 3          Anesthesia Type: No value filed.     Bijan Phase I: Bijan Score: 10    Bijan Phase II:        Anesthesia Post Evaluation    Patient location during evaluation: bedside  Patient participation: complete - patient participated  Level of consciousness: sleepy but conscious  Pain score: 0  Airway patency: patent  Nausea & Vomiting: no nausea and no vomiting  Complications: no  Cardiovascular status: blood pressure returned to baseline  Respiratory status: acceptable, room air and spontaneous ventilation  Hydration status: euvolemic

## 2022-10-28 NOTE — ANESTHESIA PRE PROCEDURE
Department of Anesthesiology  Preprocedure Note       Name:  Basilio Lafleur   Age:  46 y.o.  :  1970                                          MRN:  [id number]         Date:  10/28/2022      Surgeon: Federico Bahena):  Ang Munroe MD    Procedure: Procedure(s):  EGD BIOPSY    Medications prior to admission:   Prior to Admission medications    Medication Sig Start Date End Date Taking? Authorizing Provider   lisinopril (PRINIVIL;ZESTRIL) 20 MG tablet Take 20 mg by mouth daily    Historical Provider, MD   pantoprazole (PROTONIX) 40 MG tablet Take 1 tablet by mouth 2 times daily 22   Ang Munroe MD   sucralfate (CARAFATE) 1 GM/10ML suspension Take 10 mLs by mouth 4 times daily 22   Ang Munroe MD   atorvastatin (LIPITOR) 20 MG tablet Take 20 mg by mouth daily 3/9/21   Historical Provider, MD   tiZANidine (ZANAFLEX) 4 MG capsule Take 4 mg by mouth 2 times daily  20   Historical Provider, MD   Ascorbic Acid (VITAMIN C) 500 MG CHEW Take 500 mg by mouth daily    Historical Provider, MD   Glucosamine 500 MG CAPS Take by mouth daily     Historical Provider, MD   aspirin 81 MG EC tablet Take 81 mg by mouth daily. Historical Provider, MD   therapeutic multivitamin-minerals (THERAGRAN-M) tablet Take 1 tablet by mouth daily.     Historical Provider, MD   Psyllium (METAMUCIL PO) Take by mouth daily     Historical Provider, MD       Current medications:    Current Facility-Administered Medications   Medication Dose Route Frequency Provider Last Rate Last Admin    lactated ringers infusion   IntraVENous Continuous Ang Munroe  mL/hr at 10/28/22 0946 New Bag at 10/28/22 0946       Allergies:  No Known Allergies    Problem List:    Patient Active Problem List   Diagnosis Code    Garner's esophagus without dysplasia K22.70    Heartburn R12    Weight loss, intentional [id number]    Long segment Garner's esophagus K22.70    Chronic GERD K21.9    Garner's esophagus determined by biopsy K22.70 Past Medical History:        Diagnosis Date    Madden's esophagus with low grade dysplasia     CPAP (continuous positive airway pressure) dependence     GERD (gastroesophageal reflux disease)     Hyperlipidemia     Hypertension     Sleep apnea     Weight loss        Past Surgical History:        Procedure Laterality Date    APPENDECTOMY      COLONOSCOPY N/A 09/23/2020    Dr Flash Camacho-Diverticular disease-[facility], 5 yr recall    UPPER GASTROINTESTINAL ENDOSCOPY  08/01/2011    Dr Ade Guevara neg, Long segment Madden's, 1 yr recall    UPPER GASTROINTESTINAL ENDOSCOPY  08/01/2012    Dr Lainey York (+) Madden's, no dysplasia, 1 yr recall    UPPER GASTROINTESTINAL ENDOSCOPY  10/07/2010    Dr Ramya Roa neg, Very tight proximal peptic stricture, repeat in 6 wks    UPPER GASTROINTESTINAL ENDOSCOPY  11/24/2014    Dr Merline Marvel: long segment Madden's surveillance, biopsies neg for dysplasia    UPPER GASTROINTESTINAL ENDOSCOPY  11/24/2015    Dr Yuliana Childers segment Madden's surveillance, biopsies neg for dysplasia, no kehinde    UPPER GASTROINTESTINAL ENDOSCOPY  12/21/2016    [facility]hiatal hernia, (+) Long seg Madden's (from 23 cm to 30 cm, not 34-36 cm), (-) dysplasia--1 yr recall and referral to [facility] for ablation therapy    UPPER GASTROINTESTINAL ENDOSCOPY  12/20/2017    Dr Luise Scheuermann [place]-Long seg madden's (+) dysplasia (-), 1 yr recall, ablation referral also    UPPER GASTROINTESTINAL ENDOSCOPY  03/06/2019    Dr Ricardo Anna-Distal esophagitis, gastritis (+) Madden's (-) dysplasia, 3 yr recall    UPPER GASTROINTESTINAL ENDOSCOPY N/A 09/23/2020    Dr Flash Camacho(+) LSB, (+)LGD x 3 segments, 1 indeterminate for dysplasia, 4 segments w/o dysplasia, 6 month recall    UPPER GASTROINTESTINAL ENDOSCOPY  06/02/2021    Dr Ricardo Anna (+) LSB, (-) dysplasia, 6 month recall    UPPER GASTROINTESTINAL ENDOSCOPY  03/02/2022    Dr Bill Webb (+) Madden's    UPPER GASTROINTESTINAL ENDOSCOPY  2013    Dr Michele German neg, Long seg Garner's, no dysplasia, gastritis, 1 yr recall    UPPER GASTROINTESTINAL ENDOSCOPY  2010    Dr Rand Marcum proximal esophageal stricture @ 21 cm, Long segment Garner's, no dysplasia, HH, 1 month recall    UPPER GASTROINTESTINAL ENDOSCOPY N/A 2022    Dr Carmen Ortega (+) Garner's, w/LGD x 2, (+) Garner's, indeterminant for dysplasia x 1, (+) Garner's, (-) dysplasia x 4, (+) Garner's, no dysplasia x 1, RFA recommended    UPPER GASTROINTESTINAL ENDOSCOPY N/A 2022    Dr Kasey Camacho-w/RFA #1-Repeat in 8 wks    UPPER GASTROINTESTINAL ENDOSCOPY N/A 2022    Dr Kasey Camacho-w/RFA #2 Repeat in 8 wks    VASECTOMY         Social History:    Social History     Tobacco Use    Smoking status: Former     Years: 1.00     Types: Cigarettes     Quit date: 2006     Years since quittin.1    Smokeless tobacco: Never   Substance Use Topics    Alcohol use: Yes     Comment: occasional beer                                Counseling given: Not Answered      Vital Signs (Current):   Vitals:    10/28/22 0939   BP: 103/65   Pulse: 81   Resp: 18   Temp: 97.1 °F (36.2 °C)   SpO2: 98%   Weight: (!) 320 lb (145.2 kg)   Height: 6' (1.829 m)                                              BP Readings from Last 3 Encounters:   10/28/22 103/65   22 (!) 141/89   22 (!) 128/101       NPO Status:                                                                                 BMI:   Wt Readings from Last 3 Encounters:   10/28/22 (!) 320 lb (145.2 kg)   22 (!) 320 lb (145.2 kg)   22 (!) 320 lb (145.2 kg)     Body mass index is 43.4 kg/m².     CBC: No results found for: WBC, RBC, HGB, HCT, MCV, RDW, PLT    CMP: No results found for: NA, K, CL, CO2, BUN, CREATININE, GFRAA, AGRATIO, LABGLOM, GLUCOSE, GLU, PROT, CALCIUM, BILITOT, ALKPHOS, AST, ALT    POC Tests: No results for input(s): POCGLU, POCNA, POCK, POCCL, POCBUN, POCHEMO, POCHCT in the last 72 hours. Coags: No results found for: PROTIME, INR, APTT    HCG (If Applicable): No results found for: PREGTESTUR, PREGSERUM, HCG, HCGQUANT     ABGs: No results found for: PHART, PO2ART, NGJ6GQL, CAG7FXN, BEART, G7LDKZGR     Type & Screen (If Applicable):  No results found for: LABABO, LABRH    Drug/Infectious Status (If Applicable):  No results found for: HIV, HEPCAB    COVID-19 Screening (If Applicable):   Lab Results   Component Value Date/Time    COVID19 Not Detected 02/28/2022 11:56 AM           Anesthesia Evaluation  Patient summary reviewed and Nursing notes reviewed  Airway: Mallampati: II  TM distance: >3 FB   Neck ROM: full  Mouth opening: > = 3 FB   Dental: normal exam         Pulmonary:Negative Pulmonary ROS and normal exam    (+) sleep apnea: on CPAP,                             Cardiovascular:Negative CV ROS  Exercise tolerance: poor (<4 METS),   (+) hypertension:,          Beta Blocker:  Not on Beta Blocker         Neuro/Psych:   Negative Neuro/Psych ROS              GI/Hepatic/Renal: Neg GI/Hepatic/Renal ROS  (+) GERD:,           Endo/Other: Negative Endo/Other ROS                    Abdominal:             Vascular: negative vascular ROS. Other Findings:           Anesthesia Plan      general and TIVA     ASA 3       Induction: intravenous. Anesthetic plan and risks discussed with patient. Plan discussed with CRNA.                     SHEFALI Aguirre - DARIAN   10/28/2022

## 2022-10-28 NOTE — ADDENDUM NOTE
Addendum  created 10/28/22 1421 by Freeman Strong, [address] edited, Orders acknowledged in Narrator

## 2022-10-28 NOTE — TELEPHONE ENCOUNTER
Mari Rice,    Per Dr Cristina Lawson today:       IMPRESSION:  1. Garner's esophagus s/p RFA #3        RECOMMENDATIONS:    - Repeat EGD for biopsy in 8 weeks. - PPI 40 mg twice daily for the next 3 months  - GI cocktail as needed  - Carafate slurry 3-4 times daily as needed  - Liquid diet for the next 24 hours, then advancing diet to soft diet for the next week as tolerated  - Avoid NSAIDs for the next week  - Please contact our office at [phone] if you develop significant chest pain, difficulty swallowing, fever, bleeding, abdominal pain, difficulty breathing, or vomiting.      - I think further intervention may include consideration of Hiatal hernia repair if BE not improved. The results were discussed with the patient and family. A copy of the images obtained were given to the patient.       Adal Fabian MD  10/28/2022  10:54 AM

## 2022-10-28 NOTE — H&P
Patient Name: Jessica Dietz  : 1970  MRN: [id number]  DATE: 10/28/22    Allergies: No Known Allergies     ENDOSCOPY  History and Physical    Procedure:    [] Diagnostic Colonoscopy       [] Screening Colonoscopy  [x] EGD      [] ERCP      [] EUS       [] Other    [x] Previous office notes/History and Physical reviewed from the patients chart. Please see EMR for further details of HPI. I have examined the patient's status immediately prior to the procedure and:      Indications/HPI:    []Abdominal Pain   [x]Barretts  []Screening/Surveillance   []History of Polyps  []Dysphagia            [] +Cologard/DNA testing  []Abnormal Imaging              []EOE Hx              [] Family Hx of CRC/Polyps  []Anemia                            []Food Impaction       []Recent Poor Prep  []GI Bleed             []Lymphadenopathy  []History of Polyps  []Change in bowel habits []Heartburn/Reflux  []Cancer- GI/Lung  []Chest Pain - Non Cardiac []Heme (+) Stool []Ulcers  []Constipation  []Hemoptysis  []Incontinence    []Diarrhea  []Hypoxemia  []Rectal Bleed (BRBPR)  []Nausea/Vomiting   [] Varices  []Crohns/Colitis  []Pancreatic Cyst   [] Cirrhosis   []Pancreatitis    []Abnormal MRCP  []Elevated LFT [] Stent Removal, Previous ERCP  []Other:     Anesthesia:   [x] MAC [] Moderate Sedation   [] General   [] None     ROS: 12 pt Review of Symptoms was negative unless mentioned above    Medications:   Prior to Admission medications    Medication Sig Start Date End Date Taking?  Authorizing Provider   lisinopril (PRINIVIL;ZESTRIL) 20 MG tablet Take 20 mg by mouth daily    Historical Provider, MD   pantoprazole (PROTONIX) 40 MG tablet Take 1 tablet by mouth 2 times daily 22   Pallavi Dodge MD   sucralfate (CARAFATE) 1 GM/10ML suspension Take 10 mLs by mouth 4 times daily 22   Pallavi Dodge MD   atorvastatin (LIPITOR) 20 MG tablet Take 20 mg by mouth daily 3/9/21   Historical Provider, MD   tiZANidine (ZANAFLEX) 4 MG capsule Take 4 mg by mouth 2 times daily  7/29/20   Historical Provider, MD   Ascorbic Acid (VITAMIN C) 500 MG CHEW Take 500 mg by mouth daily    Historical Provider, MD   Glucosamine 500 MG CAPS Take by mouth daily     Historical Provider, MD   aspirin 81 MG EC tablet Take 81 mg by mouth daily. Historical Provider, MD   therapeutic multivitamin-minerals (THERAGRAN-M) tablet Take 1 tablet by mouth daily.     Historical Provider, MD   Psyllium (METAMUCIL PO) Take by mouth daily     Historical Provider, MD       Past Medical History:  Past Medical History:   Diagnosis Date    Madden's esophagus with low grade dysplasia     CPAP (continuous positive airway pressure) dependence     GERD (gastroesophageal reflux disease)     Hyperlipidemia     Hypertension     Sleep apnea     Weight loss        Past Surgical History:  Past Surgical History:   Procedure Laterality Date    APPENDECTOMY      COLONOSCOPY N/A 09/23/2020    Dr Jillian Camacho-Diverticular disease-[facility], 5 yr recall    UPPER GASTROINTESTINAL ENDOSCOPY  08/01/2011    Dr Santos Rodríguez neg, Long segment Madden's, 1 yr recall    UPPER GASTROINTESTINAL ENDOSCOPY  08/01/2012    Dr Doni Gutierrez (+) Madden's, no dysplasia, 1 yr recall    UPPER GASTROINTESTINAL ENDOSCOPY  10/07/2010    Dr Reddy Vo neg, Very tight proximal peptic stricture, repeat in 6 wks    UPPER GASTROINTESTINAL ENDOSCOPY  11/24/2014    Dr Deirdre Hoffman: long segment Madden's surveillance, biopsies neg for dysplasia    UPPER GASTROINTESTINAL ENDOSCOPY  11/24/2015    Dr Velasquez [place] segment Madden's surveillance, biopsies neg for dysplasia, no kehinde    UPPER GASTROINTESTINAL ENDOSCOPY  12/21/2016    Dr Deirdre Galicia [facility])-hiatal hernia, (+) Long seg Madden's (from 23 cm to 30 cm, not 34-36 cm), (-) dysplasia--1 yr recall and referral to [facility] for ablation therapy    UPPER GASTROINTESTINAL ENDOSCOPY  12/20/2017    Dr Freddy Walter [facility]-Long seg madden's (+) dysplasia (-), 1 yr recall, ablation referral also    UPPER GASTROINTESTINAL ENDOSCOPY  2019    Dr Gabino Segundo [facility]-Distal esophagitis, gastritis (+) Garner's (-) dysplasia, 3 yr recall    UPPER GASTROINTESTINAL ENDOSCOPY N/A 2020    Dr Onur Camacho(+) LSB, (+)LGD x 3 segments, 1 indeterminate for dysplasia, 4 segments w/o dysplasia, 6 month recall    UPPER GASTROINTESTINAL ENDOSCOPY  2021    Dr Gabino Segundo [facility] (+) LSB, (-) dysplasia, 6 month recall    UPPER GASTROINTESTINAL ENDOSCOPY  2022    Dr Winston Kenny (+) Garner's    UPPER GASTROINTESTINAL ENDOSCOPY  2013    Dr Leyla Gunter neg, Long seg Garner's, no dysplasia, gastritis, 1 yr recall    UPPER GASTROINTESTINAL ENDOSCOPY  2010    Dr Maite Corado proximal esophageal stricture @ 21 cm, Long segment Garner's, no dysplasia, HH, 1 month recall    UPPER GASTROINTESTINAL ENDOSCOPY N/A 2022    Dr Winston Kenny (+) Garner's, w/LGD x 2, (+) Garner's, indeterminant for dysplasia x 1, (+) Garner's, (-) dysplasia x 4, (+) Garner's, no dysplasia x 1, RFA recommended    UPPER GASTROINTESTINAL ENDOSCOPY N/A 2022    Dr Onur Camacho-w/RFA #1-Repeat in 8 wks    UPPER GASTROINTESTINAL ENDOSCOPY N/A 2022    Dr Onur Camacho-w/RFA #2 Repeat in 8 wks    VASECTOMY         Social History:  Social History     Tobacco Use    Smoking status: Former     Years: 1.00     Types: Cigarettes     Quit date: 2006     Years since quittin.1    Smokeless tobacco: Never   Vaping Use    Vaping Use: Never used   Substance Use Topics    Alcohol use: Yes     Comment: occasional beer    Drug use: No       Vital Signs:   Vitals:    10/28/22 0939   BP: 103/65   Pulse: 81   Resp: 18   Temp: 97.1 °F (36.2 °C)   SpO2: 98%        Physical Exam:  Cardiac:  [x]WNL  []Comments:  Pulmonary:  [x]WNL   []Comments:  Neuro/Mental Status:  [x]WNL  []Comments:  Abdominal:  [x]WNL    []Comments:  Other:   []WNL  []Comments:    Informed Consent:  The risks and benefits of the procedure have been discussed with either the patient or if they cannot consent, their representative. Assessment:  Patient examined and appropriate for planned sedation and procedure. Plan:  Proceed with planned sedation and procedure as above.          Miko Ferris MD

## 2022-10-28 NOTE — OP NOTE
Endoscopic Procedure Note    Patient: Earline Burgess : 1970  [facility] Rec#: [id number] Acc#: [id number]     Primary Care Provider SHEFALI High - CNP  Referring Provider: Mk MEEHAN    Endoscopist: Svetlana Colin MD    Date of Procedure:  10/28/2022    Procedure:   1. EGD with RFA of Garner's Esophagus     Indications:   1. Garner's esophagus with LGD   2. S/p RFA in the past.     Anesthesia:  Sedation was administered by anesthesia who monitored the patient during the procedure. Estimated Blood Loss: minimal    Procedure:   After reviewing the patient's chart and obtaining informed consent, the patient was placed in the left lateral decubitus position. A forward-viewing Olympus endoscope was lubricated and inserted through the mouth into the oropharynx. Under direct visualization, the upper esophagus was intubated. The scope was advanced to the level of the third portion of duodenum. Scope was slowly withdrawn with careful inspection of the mucosal surfaces. The scope was retroflexed for inspection of the gastric fundus and incisura. Findings and maneuvers are listed in impression below. The patient tolerated the procedure well. The scope was removed. There were no immediate complications. Findings:   Esophagus:     Esophagus: abnormal: in the distal esophagus there was evidence approximately 14 cm of abnormal tissue. The gastric folds were measured at 36 cm from the incisors. The top of intestinal metaplasia appeared to be at approximately 22 cm. This appeared to be slightly nodular with no focal abnormality. This had previously been biopsied consistent with LGD. The Garner's esophagus tissue was irrigated with Mucomyst 1% mixed with water. A guidewire was placed and the endoscope removed. A Barrx 360 express RFA balloon catheter was introduced over the guidewire. The endoscope was introduced in a [id number] manner with the balloon catheter.   The balloon electrode was positioned under direct visualization so that the proximal edge electrode slightly above the top of the distal metaplasia. The balloon was automatically inflated and energy applied at 230 W and 10 J/cm². The displayed inner diameter measurement was recorded. The electrode was moved distally 4 cm, aligning the proximal edge of electrode with the distal edge of the ablation zone. Inflation ablation was repeated until the top the gastric folds was reached. The balloon catheter was removed, cleaned and subsequently reintroduced after cleaning of ablation zone. The ablation zone was cleaned of coagulative debris with irrigation and suction using endoscope and cleaning. The balloon catheter was positioned under requisition so that the proximal edge of the electrode was at the proximal edge of the ablation zone. Inflation, ablation, and repositioning were repeated as in the first set of treatments. The balloon catheter and guidewire removed. Endoscopically complete ablation of the intestinal metaplasia was seen. There is a small hiatal hernia present. Stomach:  normal     Duodenum: normal      IMPRESSION:  1. Garner's esophagus s/p RFA #3       RECOMMENDATIONS:    - Repeat EGD for biopsy in 8 weeks. - PPI 40 mg twice daily for the next 3 months  - GI cocktail as needed  - Carafate slurry 3-4 times daily as needed  - Liquid diet for the next 24 hours, then advancing diet to soft diet for the next week as tolerated  - Avoid NSAIDs for the next week  - Please contact our office at [phone] if you develop significant chest pain, difficulty swallowing, fever, bleeding, abdominal pain, difficulty breathing, or vomiting.     - I think further intervention may include consideration of Hiatal hernia repair if BE not improved. The results were discussed with the patient and family. A copy of the images obtained were given to the patient.      Jimy Jimenez MD  10/28/2022  10:54 AM

## 2022-10-28 NOTE — DISCHARGE INSTRUCTIONS
RECOMMENDATIONS:    - Repeat EGD for biopsy in 8 weeks. - PPI 40 mg twice daily for the next 3 months  - GI cocktail as needed  - Carafate slurry 3-4 times daily as needed  - Liquid diet for the next 24 hours, then advancing diet to soft diet for the next week as tolerated  - Avoid NSAIDs for the next week  - Please contact our office at [phone] if you develop significant chest pain, difficulty swallowing, fever, bleeding, abdominal pain, difficulty breathing, or vomiting. Upper GI Endoscopy: What to Expect at [facility] had an upper GI endoscopy. Your doctor used a thin, lighted tube that bends to look at the inside of your esophagus, your stomach, and the first part of the small intestine, called the duodenum. After you have an endoscopy, you will stay at the hospital or clinic for 1 to 2 hours. This will allow the medicine to wear off. You will be able to go home after your doctor or nurse checks to make sure that you're not having any problems. You may have to stay overnight if you had treatment during the test. You may have a sore throat for a day or two after the test.  This care sheet gives you a general idea about what to expect after the test.  How can you care for yourself at home? Activity   Rest as much as you need to after you go home. You should be able to go back to your usual activities the day after the test.  Diet   Follow your doctor's directions for eating after the test.  Drink plenty of fluids (unless your doctor has told you not to). Medications   If you have a sore throat the day after the test, use an over-the-counter spray to numb your throat. Follow-up care is a key part of your treatment and safety. Be sure to make and go to all appointments, and call your doctor if you are having problems. It's also a good idea to know your test results and keep a list of the medicines you take. When should you call for help?    Call 911 anytime you think you may need emergency care. For example, call if:    You passed out (lost consciousness). You have trouble breathing. You pass maroon or bloody stools. Call your doctor now or seek immediate medical care if:    You have pain that does not get better after your take pain medicine. You have new or worse belly pain. You have blood in your stools. You are sick to your stomach and cannot keep fluids down. You have a fever. You cannot pass stools or gas. Watch closely for changes in your health, and be sure to contact your doctor if:    Your throat still hurts after a day or two. You do not get better as expected. Where can you learn more? Go to https://[email or web address]. org and sign in to your [employer] account. Enter A204 in the Mirego box to learn more about \"Upper GI Endoscopy: What to Expect at Home. \"     If you do not have an account, please click on the \"Sign Up Now\" link. Current as of: June 6, 2022               Content Version: 13.4  © 2006-2022 [employer]. Care instructions adapted under license by [facility] ([facility]). If you have questions about a medical condition or this instruction, always ask your healthcare professional. [place] [address] any warranty or liability for your use of this information.

## 2022-11-09 NOTE — TELEPHONE ENCOUNTER
Patient is scheduled for repeat EGD with Dr. Mor Keith at [facility] on 12/20/22 to arrive at 8:00. Patient is aware of appointment date and all instructions.

## 2023-01-06 ENCOUNTER — TELEPHONE (OUTPATIENT)
Dept: GASTROENTEROLOGY | Age: 53
End: 2023-01-06

## 2023-01-06 NOTE — TELEPHONE ENCOUNTER
Called patient to remind them of their procedure  at [facility] on 1/10/23 arrive at [address] [facility]

## 2023-01-10 ENCOUNTER — HOSPITAL ENCOUNTER (OUTPATIENT)
Age: 53
Setting detail: OUTPATIENT SURGERY
Discharge: HOME | End: 2023-01-10
Attending: INTERNAL MEDICINE | Admitting: INTERNAL MEDICINE
Payer: COMMERCIAL

## 2023-01-10 ENCOUNTER — ANESTHESIA (OUTPATIENT)
Dept: ENDOSCOPY | Age: 53
End: 2023-01-10
Payer: COMMERCIAL

## 2023-01-10 ENCOUNTER — ANESTHESIA EVENT (OUTPATIENT)
Dept: ENDOSCOPY | Age: 53
End: 2023-01-10
Payer: COMMERCIAL

## 2023-01-10 VITALS
OXYGEN SATURATION: 97 % | WEIGHT: 315 LBS | SYSTOLIC BLOOD PRESSURE: 118 MMHG | BODY MASS INDEX: 42.66 KG/M2 | RESPIRATION RATE: 20 BRPM | DIASTOLIC BLOOD PRESSURE: 77 MMHG | HEIGHT: 72 IN | HEART RATE: 96 BPM | TEMPERATURE: 97.4 F

## 2023-01-10 DIAGNOSIS — K22.710 BARRETT'S ESOPHAGUS WITH LOW GRADE DYSPLASIA: ICD-10-CM

## 2023-01-10 PROCEDURE — 7100000010 HC PHASE II RECOVERY - FIRST 15 MIN: Performed by: INTERNAL MEDICINE

## 2023-01-10 PROCEDURE — 6360000002 HC RX W HCPCS

## 2023-01-10 PROCEDURE — 7100000011 HC PHASE II RECOVERY - ADDTL 15 MIN: Performed by: INTERNAL MEDICINE

## 2023-01-10 PROCEDURE — 3609012400 HC EGD TRANSORAL BIOPSY SINGLE/MULTIPLE: Performed by: INTERNAL MEDICINE

## 2023-01-10 PROCEDURE — 2580000003 HC RX 258: Performed by: INTERNAL MEDICINE

## 2023-01-10 PROCEDURE — 3700000001 HC ADD 15 MINUTES (ANESTHESIA): Performed by: INTERNAL MEDICINE

## 2023-01-10 PROCEDURE — 88305 TISSUE EXAM BY PATHOLOGIST: CPT

## 2023-01-10 PROCEDURE — 43239 EGD BIOPSY SINGLE/MULTIPLE: CPT | Performed by: INTERNAL MEDICINE

## 2023-01-10 PROCEDURE — 2500000003 HC RX 250 WO HCPCS

## 2023-01-10 PROCEDURE — 3700000000 HC ANESTHESIA ATTENDED CARE: Performed by: INTERNAL MEDICINE

## 2023-01-10 PROCEDURE — 2709999900 HC NON-CHARGEABLE SUPPLY: Performed by: INTERNAL MEDICINE

## 2023-01-10 RX ORDER — PROPOFOL 10 MG/ML
INJECTION, EMULSION INTRAVENOUS PRN
Status: DISCONTINUED | OUTPATIENT
Start: 2023-01-10 | End: 2023-01-10 | Stop reason: SDUPTHER

## 2023-01-10 RX ORDER — SODIUM CHLORIDE, SODIUM LACTATE, POTASSIUM CHLORIDE, CALCIUM CHLORIDE 600; 310; 30; 20 MG/100ML; MG/100ML; MG/100ML; MG/100ML
INJECTION, SOLUTION INTRAVENOUS CONTINUOUS
Status: DISCONTINUED | OUTPATIENT
Start: 2023-01-10 | End: 2023-01-10 | Stop reason: HOSPADM

## 2023-01-10 RX ORDER — LIDOCAINE HYDROCHLORIDE 10 MG/ML
INJECTION, SOLUTION EPIDURAL; INFILTRATION; INTRACAUDAL; PERINEURAL PRN
Status: DISCONTINUED | OUTPATIENT
Start: 2023-01-10 | End: 2023-01-10 | Stop reason: SDUPTHER

## 2023-01-10 RX ADMIN — SODIUM CHLORIDE, POTASSIUM CHLORIDE, SODIUM LACTATE AND CALCIUM CHLORIDE: 600; 310; 30; 20 INJECTION, SOLUTION INTRAVENOUS at 09:44

## 2023-01-10 RX ADMIN — LIDOCAINE HYDROCHLORIDE 50 MG: 10 INJECTION, SOLUTION EPIDURAL; INFILTRATION; INTRACAUDAL; PERINEURAL at 10:35

## 2023-01-10 RX ADMIN — PROPOFOL 400 MG: 10 INJECTION, EMULSION INTRAVENOUS at 10:35

## 2023-01-10 ASSESSMENT — PAIN - FUNCTIONAL ASSESSMENT: PAIN_FUNCTIONAL_ASSESSMENT: 0-10

## 2023-01-10 NOTE — OP NOTE
Endoscopic Procedure Note    Patient: Loly Mancilla : 1970  Med Rec#: [id number] Acc#: [id number]     Primary Care Provider SHEFALI Grarison CNP  Referring Provider:     Endoscopist: Michael Jiménez MD    Date of Procedure:  1/10/2023    Procedure:   1. EGD with biopsy    Indications:   1. 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       Anesthesia:  Sedation was administered by anesthesia who monitored the patient during the procedure. Estimated Blood Loss: minimal    Procedure:   After reviewing the patient's chart and obtaining informed consent, the patient was placed in the left lateral decubitus position. A forward-viewing Olympus endoscope was lubricated and inserted through the mouth into the oropharynx. Under direct visualization, the upper esophagus was intubated. The scope was advanced to the level of the third portion of duodenum. Scope was slowly withdrawn with careful inspection of the mucosal surfaces. The scope was retroflexed for inspection of the gastric fundus and incisura. Findings and maneuvers are listed in impression below. The patient tolerated the procedure well. The scope was removed. There were no immediate complications. Findings:   Esophagus: abnormal: there is evidence of Garner's esophagus extending proximally from 36cm to 22cm. The distal 3-4cm of the esophagus appears much improved from previous. However, at approximately 28-32cm there are changes concerning for severe Garner's esophagus. There is no focal nodule or ulceration, however the tissue does diffusely appear more irregular than other parts of the esophagus. Multiple 4-quadrant biopsies obtained separately every 1-2cm from 22 to 36cm. There is a very small hiatal hernia present. Stomach:  normal       Duodenum: normal      IMPRESSION:  1. Garner's esophagus with h/o LGD      RECOMMENDATIONS:    1. Await path results  2.   I am concerned about the cancer risk associated with the tissue at 22-32cm. The patient is s/p RFA x 3 at our institution. If there are continued dysplasia changes noted, then he would likely benefit from a second opinion and/or attempt at other endoscopic therapy of this apparent high risk Garner's esophagus. 3.  Continue PPI BID    The results were discussed with the patient and family. A copy of the images obtained were given to the patient.      Socorro Poole MD  1/10/2023  11:34 AM

## 2023-01-10 NOTE — DISCHARGE INSTRUCTIONS
RECOMMENDATIONS:     Await path results   I am concerned about the cancer risk associated with the tissue at 22-32cm. The patient is s/p RFA x 3 at our institution. If there are continued dysplasia changes noted, then he would likely benefit from a second opinion and/or attempt at other endoscopic therapy of this apparent high risk Garner's esophagus.     Continue PPI BID

## 2023-01-10 NOTE — ANESTHESIA PRE PROCEDURE
Department of Anesthesiology  Preprocedure Note       Name:  Jose Elias Host   Age:  46 y.o.  :  1970                                          MRN:  [id number]         Date:  1/10/2023      Surgeon: Micheal Castro):  Melvern Claude, MD    Procedure: Procedure(s):  EGD W/ RFA    Medications prior to admission:   Prior to Admission medications    Medication Sig Start Date End Date Taking? Authorizing Provider   lisinopril (PRINIVIL;ZESTRIL) 20 MG tablet Take 20 mg by mouth daily    Historical Provider, MD   pantoprazole (PROTONIX) 40 MG tablet Take 1 tablet by mouth 2 times daily 22   Melvern Claude, MD   sucralfate (CARAFATE) 1 GM/10ML suspension Take 10 mLs by mouth 4 times daily 22   Melvern Claude, MD   atorvastatin (LIPITOR) 20 MG tablet Take 20 mg by mouth daily 3/9/21   Historical Provider, MD   tiZANidine (ZANAFLEX) 4 MG capsule Take 4 mg by mouth 2 times daily  20   Historical Provider, MD   Ascorbic Acid (VITAMIN C) 500 MG CHEW Take 500 mg by mouth daily    Historical Provider, MD   Glucosamine 500 MG CAPS Take by mouth daily   Patient not taking: Reported on 1/10/2023    Historical Provider, MD   aspirin 81 MG EC tablet Take 81 mg by mouth daily. Historical Provider, MD   therapeutic multivitamin-minerals (THERAGRAN-M) tablet Take 1 tablet by mouth daily. Historical Provider, MD   Psyllium (METAMUCIL PO) Take by mouth daily     Historical Provider, MD       Current medications:    No current facility-administered medications for this visit. No current outpatient medications on file.      Facility-Administered Medications Ordered in Other Visits   Medication Dose Route Frequency Provider Last Rate Last Admin    lactated ringers infusion   IntraVENous Continuous Melvern Claude,  mL/hr at 01/10/23 0944 New Bag at 01/10/23 0944       Allergies:  No Known Allergies    Problem List:    Patient Active Problem List   Diagnosis Code    Garner's esophagus without dysplasia K22.70    Heartburn R12    Weight loss, intentional [id number]    Long segment Madden's esophagus K22.70    Chronic GERD K21.9    Madden's esophagus determined by biopsy K22.70       Past Medical History:        Diagnosis Date    Madden's esophagus with low grade dysplasia     CPAP (continuous positive airway pressure) dependence     GERD (gastroesophageal reflux disease)     Hyperlipidemia     Hypertension     Sleep apnea     Weight loss        Past Surgical History:        Procedure Laterality Date    APPENDECTOMY      COLONOSCOPY N/A 09/23/2020    Dr Gabriel Camacho-Diverticular disease-BCM, 5 yr recall    UPPER GASTROINTESTINAL ENDOSCOPY  08/01/2011    Dr Sd Mcwilliams neg, Long segment Madden's, 1 yr recall    UPPER GASTROINTESTINAL ENDOSCOPY  08/01/2012    Dr Socorro Sanchez (+) Madden's, no dysplasia, 1 yr recall    UPPER GASTROINTESTINAL ENDOSCOPY  10/07/2010    Dr Tangela Diaz neg, Very tight proximal peptic stricture, repeat in 6 wks    UPPER GASTROINTESTINAL ENDOSCOPY  11/24/2014    Dr Bright Speaker: long segment Madden's surveillance, biopsies neg for dysplasia    UPPER GASTROINTESTINAL ENDOSCOPY  11/24/2015    Dr Justice Shiraz segment Madden's surveillance, biopsies neg for dysplasia, no kehinde    UPPER GASTROINTESTINAL ENDOSCOPY  12/21/2016    Dr Bright Speaker [facility])-hiatal hernia, (+) Long seg Madden's (from 23 cm to 30 cm, not 34-36 cm), (-) dysplasia--1 yr recall and referral [facility] for ablation therapy    UPPER GASTROINTESTINAL ENDOSCOPY  12/20/2017    Dr Tonya Chavez Co-Long seg madden's (+) dysplasia (-), 1 yr recall, ablation referral also    UPPER GASTROINTESTINAL ENDOSCOPY  03/06/2019    Dr Rizwana Stroud Co-Distal esophagitis, gastritis (+) Madden's (-) dysplasia, 3 yr recall    UPPER GASTROINTESTINAL ENDOSCOPY N/A 09/23/2020    Dr Gabriel Camacho(+) LSB, (+)LGD x 3 segments, 1 indeterminate for dysplasia, 4 segments w/o dysplasia, 6 month recall    UPPER GASTROINTESTINAL ENDOSCOPY  06/02/2021 Dr Kt Lucia Co (+) LSB, (-) dysplasia, 6 month recall    UPPER GASTROINTESTINAL ENDOSCOPY  2013    Dr Sandra Espana neg, Long seg Garner's, no dysplasia, gastritis, 1 yr recall    UPPER GASTROINTESTINAL ENDOSCOPY  2010    Dr Peter Odell proximal esophageal stricture @ 21 cm, Long segment Garner's, no dysplasia, HH, 1 month recall    UPPER GASTROINTESTINAL ENDOSCOPY N/A 2022    Dr Judith Ramos (+) Garner's, w/LGD x 2, (+) Garner's, indeterminant for dysplasia x 1, (+) Garner's, (-) dysplasia x 4, (+) Garner's, no dysplasia x 1, RFA recommended    UPPER GASTROINTESTINAL ENDOSCOPY N/A 2022    Dr Flores Camacho-w/RFA #1 Repeat in 8 wks    UPPER GASTROINTESTINAL ENDOSCOPY N/A 2022    Dr Flores Camacho-w/RFA #2 Repeat in 8 wks    UPPER GASTROINTESTINAL ENDOSCOPY N/A 10/28/2022    Dr Judith Ramos w/RFA #3 Repeat in 8 wks    VASECTOMY         Social History:    Social History     Tobacco Use    Smoking status: Former     Years: 1.00     Types: Cigarettes     Quit date: 2006     Years since quittin.3    Smokeless tobacco: Never   Substance Use Topics    Alcohol use: Yes     Comment: occasional beer                                Counseling given: Not Answered      Vital Signs (Current): There were no vitals filed for this visit.                                            BP Readings from Last 3 Encounters:   01/10/23 137/88   10/28/22 (!) 160/88   22 (!) 141/89       NPO Status:                                                                                 BMI:   Wt Readings from Last 3 Encounters:   01/10/23 (!) 320 lb (145.2 kg)   10/28/22 (!) 320 lb (145.2 kg)   22 (!) 320 lb (145.2 kg)     There is no height or weight on file to calculate BMI.    CBC: No results found for: WBC, RBC, HGB, HCT, MCV, RDW, PLT    CMP: No results found for: NA, K, CL, CO2, BUN, CREATININE, GFRAA, AGRATIO, LABGLOM, GLUCOSE, GLU, PROT, CALCIUM, BILITOT, ALKPHOS, AST, ALT    POC Tests: No results for input(s): POCGLU, POCNA, POCK, POCCL, POCBUN, POCHEMO, POCHCT in the last 72 hours. Coags: No results found for: PROTIME, INR, APTT    HCG (If Applicable): No results found for: PREGTESTUR, PREGSERUM, HCG, HCGQUANT     ABGs: No results found for: PHART, PO2ART, JCE8XHQ, UXG4FHE, BEART, J0GHAGJM     Type & Screen (If Applicable):  No results found for: LABABO, LABRH    Drug/Infectious Status (If Applicable):  No results found for: HIV, HEPCAB    COVID-19 Screening (If Applicable):   Lab Results   Component Value Date/Time    COVID19 Not Detected 02/28/2022 11:56 AM           Anesthesia Evaluation  Patient summary reviewed and Nursing notes reviewed  Airway: Mallampati: II  TM distance: >3 FB   Neck ROM: full  Mouth opening: > = 3 FB   Dental: normal exam         Pulmonary:Negative Pulmonary ROS and normal exam    (+) sleep apnea: on CPAP,                             Cardiovascular:Negative CV ROS  Exercise tolerance: poor (<4 METS),   (+) hypertension:,          Beta Blocker:  Not on Beta Blocker         Neuro/Psych:   Negative Neuro/Psych ROS              GI/Hepatic/Renal: Neg GI/Hepatic/Renal ROS  (+) GERD:,           Endo/Other: Negative Endo/Other ROS                    Abdominal:             Vascular: negative vascular ROS. Other Findings:             Anesthesia Plan      general and TIVA     ASA 3       Induction: intravenous. Anesthetic plan and risks discussed with patient. Plan discussed with CRNA.                     SHEFALI Kelly - CRNA   1/10/2023

## 2023-01-10 NOTE — H&P
Patient Name: Gilma Horvath  : 1970  MRN: [id number]  DATE: 01/10/23    Allergies: No Known Allergies     ENDOSCOPY  History and Physical    Procedure:    [] Diagnostic Colonoscopy       [] Screening Colonoscopy  [x] EGD      [] ERCP      [] EUS       [] Other    [x] Previous office notes/History and Physical reviewed from the patients chart. Please see EMR for further details of HPI. I have examined the patient's status immediately prior to the procedure and:      Indications/HPI:    []Abdominal Pain   [x]Barretts  []Screening/Surveillance   []History of Polyps  []Dysphagia            [] +Cologard/DNA testing  []Abnormal Imaging              []EOE Hx              [] Family Hx of CRC/Polyps  []Anemia                            []Food Impaction       []Recent Poor Prep  []GI Bleed             []Lymphadenopathy  []History of Polyps  []Change in bowel habits []Heartburn/Reflux  []Cancer- GI/Lung  []Chest Pain - Non Cardiac []Heme (+) Stool []Ulcers  []Constipation  []Hemoptysis  []Incontinence    []Diarrhea  []Hypoxemia  []Rectal Bleed (BRBPR)  []Nausea/Vomiting   [] Varices  []Crohns/Colitis  []Pancreatic Cyst   [] Cirrhosis   []Pancreatitis    []Abnormal MRCP  []Elevated LFT [] Stent Removal, Previous ERCP  []Other:     Anesthesia:   [x] MAC [] Moderate Sedation   [] General   [] None     ROS: 12 pt Review of Symptoms was negative unless mentioned above    Medications:   Prior to Admission medications    Medication Sig Start Date End Date Taking?  Authorizing Provider   lisinopril (PRINIVIL;ZESTRIL) 20 MG tablet Take 20 mg by mouth daily    Historical Provider, MD   pantoprazole (PROTONIX) 40 MG tablet Take 1 tablet by mouth 2 times daily 22   Chris Robison MD   sucralfate (CARAFATE) 1 GM/10ML suspension Take 10 mLs by mouth 4 times daily 22   Chris Robison MD   atorvastatin (LIPITOR) 20 MG tablet Take 20 mg by mouth daily 3/9/21   Historical Provider, MD   tiZANidine (ZANAFLEX) 4 MG capsule Take 4 mg by mouth 2 times daily  7/29/20   Historical Provider, MD   Ascorbic Acid (VITAMIN C) 500 MG CHEW Take 500 mg by mouth daily    Historical Provider, MD   Glucosamine 500 MG CAPS Take by mouth daily   Patient not taking: Reported on 1/10/2023    Historical Provider, MD   aspirin 81 MG EC tablet Take 81 mg by mouth daily. Historical Provider, MD   therapeutic multivitamin-minerals (THERAGRAN-M) tablet Take 1 tablet by mouth daily.     Historical Provider, MD   Psyllium (METAMUCIL PO) Take by mouth daily     Historical Provider, MD       Past Medical History:  Past Medical History:   Diagnosis Date    Madden's esophagus with low grade dysplasia     CPAP (continuous positive airway pressure) dependence     GERD (gastroesophageal reflux disease)     Hyperlipidemia     Hypertension     Sleep apnea     Weight loss        Past Surgical History:  Past Surgical History:   Procedure Laterality Date    APPENDECTOMY      COLONOSCOPY N/A 09/23/2020    Dr Fernandez Camacho-Diverticular disease-BCM, 5 yr recall    UPPER GASTROINTESTINAL ENDOSCOPY  08/01/2011    Dr Matti Barrera neg, Long segment Madden's, 1 yr recall    UPPER GASTROINTESTINAL ENDOSCOPY  08/01/2012    Dr Eddy Lung (+) Madden's, no dysplasia, 1 yr recall    UPPER GASTROINTESTINAL ENDOSCOPY  10/07/2010    Dr Joesph Greer neg, Very tight proximal peptic stricture, repeat in 6 wks    UPPER GASTROINTESTINAL ENDOSCOPY  11/24/2014    Dr Ge Rehman: long segment Madden's surveillance, biopsies neg for dysplasia    UPPER GASTROINTESTINAL ENDOSCOPY  11/24/2015    Dr Schafer Better segment Madden's surveillance, biopsies neg for dysplasia, no kehinde    UPPER GASTROINTESTINAL ENDOSCOPY  12/21/2016    [facility]hiatal her[facility], (+) Long seg Madden's (from 23 cm to 30 cm, not 34-36 cm), (-) dysplasia--1 yr recall and referral to [facility] for ablation therapy    UPPER GASTROINTESTINAL ENDOSCOPY  12/20/2017    Dr Brian Caceres Co-Long seg madden's (+) dysplasia (-), 1 yr recall, ablation referral also    UPPER GASTROINTESTINAL ENDOSCOPY  2019    Dr Juan Carlos Anna-Distal esophagitis, gastritis (+) Garner's (-) dysplasia, 3 yr recall    UPPER GASTROINTESTINAL ENDOSCOPY N/A 2020    Dr Faina Camacho(+) LSB, (+)LGD x 3 segments, 1 indeterminate for dysplasia, 4 segments w/o dysplasia, 6 month recall    UPPER GASTROINTESTINAL ENDOSCOPY  2021    Dr Juan Carlos Anna (+) LSB, (-) dysplasia, 6 month recall    UPPER GASTROINTESTINAL ENDOSCOPY  2013    Dr Gale Madera neg, Long seg Garner's, no dysplasia, gastritis, 1 yr recall    UPPER GASTROINTESTINAL ENDOSCOPY  2010    Dr Donell Miller proximal esophageal stricture @ 21 cm, Long segment Garner's, no dysplasia, HH, 1 month recall    UPPER GASTROINTESTINAL ENDOSCOPY N/A 2022    Dr Kylee Soler (+) Garner's, w/LGD x 2, (+) Garner's, indeterminant for dysplasia x 1, (+) Garner's, (-) dysplasia x 4, (+) Garner's, no dysplasia x 1, RFA recommended    UPPER GASTROINTESTINAL ENDOSCOPY N/A 2022    Dr Faina Camacho-w/RFA #1 Repeat in 8 wks    UPPER GASTROINTESTINAL ENDOSCOPY N/A 2022    Dr Faina Camacho-w/RFA #2 Repeat in 8 wks    UPPER GASTROINTESTINAL ENDOSCOPY N/A 10/28/2022    Dr Kylee Soler w/RFA #3 Repeat in 8 wks    VASECTOMY         Social History:  Social History     Tobacco Use    Smoking status: Former     Years: 1.00     Types: Cigarettes     Quit date: 2006     Years since quittin.3    Smokeless tobacco: Never   Vaping Use    Vaping Use: Never used   Substance Use Topics    Alcohol use: Yes     Comment: occasional beer    Drug use: No       Vital Signs:   Vitals:    01/10/23 0932   BP: 137/88   Pulse: 84   Resp: 16   Temp: 97.9 °F (36.6 °C)   SpO2: 96%        Physical Exam:  Cardiac:  [x]WNL  []Comments:  Pulmonary:  [x]WNL   []Comments:  Neuro/Mental Status:  [x]WNL  []Comments:  Abdominal:  [x]WNL    []Comments:  Other:   []WNL  []Comments:    Informed Consent:   The risks and benefits of the procedure have been discussed with either the patient or if they cannot consent, their representative. Assessment:  Patient examined and appropriate for planned sedation and procedure. Plan:  Proceed with planned sedation and procedure as above.          Nakia Epperson MD

## 2023-01-10 NOTE — ANESTHESIA POSTPROCEDURE EVALUATION
Department of Anesthesiology  Postprocedure Note    Patient: Jessica Dietz  MRN: [id number]  [place]: 1970  Date of evaluation: 1/10/2023      Procedure Summary     Date: 01/10/23 Room / Location: [address]    Anesthesia Start: 1034 Anesthesia Stop:     Procedure: EGD W/ RFA (Abdomen) Diagnosis:       Garner's esophagus with low grade dysplasia      (History of Garner's esophagus [Z87.19])    Surgeons: Pallavi Dodge MD Responsible Provider: SHEFALI Barnhart CRNA    Anesthesia Type: general, TIVA ASA Status: 3          Anesthesia Type: No value filed.     Bijan Phase I:      Bijan Phase II:        Anesthesia Post Evaluation    Patient location during evaluation: bedside  Patient participation: complete - patient participated  Level of consciousness: sleepy but conscious  Airway patency: patent  Nausea & Vomiting: no nausea and no vomiting  Complications: no  Cardiovascular status: hemodynamically stable  Respiratory status: nasal cannula  Hydration status: euvolemic  Multimodal analgesia pain management approach

## 2023-01-17 ENCOUNTER — TELEPHONE (OUTPATIENT)
Dept: GASTROENTEROLOGY | Age: 53
End: 2023-01-17

## 2023-01-19 NOTE — TELEPHONE ENCOUNTER
Pt has been advised of Dr Kaylee Read recommendations. We are going to fax the referral to Dr Alley Simmons, hoping he can do an endoscopy, but they may require an apt. They will contact pt with apt. PT is aware. MD FARAZ Floyd, [address] [place] [address] -     Please call Mr Fern Godinez and let him know the pathology results continue to show LGD in the upper parts of the esophagus. Given the nodularity associated with this area endoscopically, I have previously discussed with he and his wife that I would like him to get a second opinion on endoscopic treatment. Please [facility] the [facility]     Re: Garner's Esophagus with LGD s/p RFA x 3 with no significant improvement, ? Nodularity in mid-esophagus.      Thanks

## 2023-03-15 ENCOUNTER — OFFICE VISIT (OUTPATIENT)
Dept: FAMILY MEDICINE CLINIC | Facility: CLINIC | Age: 53
End: 2023-03-15
Payer: COMMERCIAL

## 2023-03-15 VITALS
OXYGEN SATURATION: 100 % | TEMPERATURE: 97.8 F | WEIGHT: 315 LBS | DIASTOLIC BLOOD PRESSURE: 80 MMHG | HEART RATE: 71 BPM | RESPIRATION RATE: 20 BRPM | SYSTOLIC BLOOD PRESSURE: 134 MMHG | BODY MASS INDEX: 42.66 KG/M2 | HEIGHT: 72 IN

## 2023-03-15 DIAGNOSIS — R73.01 IMPAIRED FASTING GLUCOSE: ICD-10-CM

## 2023-03-15 DIAGNOSIS — M25.511 ACUTE PAIN OF RIGHT SHOULDER: ICD-10-CM

## 2023-03-15 DIAGNOSIS — G47.33 OSA ON CPAP: ICD-10-CM

## 2023-03-15 DIAGNOSIS — E78.2 MIXED HYPERLIPIDEMIA: ICD-10-CM

## 2023-03-15 DIAGNOSIS — K22.70 BARRETT'S ESOPHAGUS WITHOUT DYSPLASIA: ICD-10-CM

## 2023-03-15 DIAGNOSIS — Z99.89 OSA ON CPAP: ICD-10-CM

## 2023-03-15 DIAGNOSIS — I10 ESSENTIAL HYPERTENSION: ICD-10-CM

## 2023-03-15 DIAGNOSIS — I10 ESSENTIAL HYPERTENSION: Primary | ICD-10-CM

## 2023-03-15 DIAGNOSIS — E66.01 MORBID (SEVERE) OBESITY DUE TO EXCESS CALORIES: ICD-10-CM

## 2023-03-15 PROCEDURE — 99214 OFFICE O/P EST MOD 30 MIN: CPT | Performed by: NURSE PRACTITIONER

## 2023-03-15 RX ORDER — PANTOPRAZOLE SODIUM 40 MG/1
40 TABLET, DELAYED RELEASE ORAL 2 TIMES DAILY
Qty: 180 TABLET | Refills: 1 | Status: SHIPPED | OUTPATIENT
Start: 2023-03-15

## 2023-03-15 RX ORDER — ATORVASTATIN CALCIUM 20 MG/1
20 TABLET, FILM COATED ORAL DAILY
Qty: 90 TABLET | Refills: 1 | Status: SHIPPED | OUTPATIENT
Start: 2023-03-15

## 2023-03-15 RX ORDER — TIZANIDINE 4 MG/1
4 TABLET ORAL EVERY 8 HOURS PRN
Qty: 90 TABLET | Refills: 1 | Status: SHIPPED | OUTPATIENT
Start: 2023-03-15

## 2023-03-15 RX ORDER — LISINOPRIL 20 MG/1
20 TABLET ORAL DAILY
Qty: 90 TABLET | Refills: 1 | Status: SHIPPED | OUTPATIENT
Start: 2023-03-15

## 2023-03-15 RX ORDER — LISINOPRIL 20 MG/1
TABLET ORAL
Qty: 90 TABLET | Refills: 3 | OUTPATIENT
Start: 2023-03-15

## 2023-03-15 NOTE — PROGRESS NOTES
"Answers for HPI/ROS submitted by the patient on 3/8/2023  What is the primary reason for your visit?: Physical    Chief Complaint  Hypertension (Patient here for follow up. )    Subjective        Danny Vega presents to [facility]  History of Present Illness  Presents for follow up for chronic problems of htn stable with lisinopril without chest pain, shortness of breath, hyperlipidemia stable with atorvastatin, gerd stable with pantoprazole, ed stable with sildenafil, Wears CPAP at least 7-8 hours a night.  No complaints today States, \"You might want to check my blood pressure again, she checked it with the machine and it was high, will recheck manual large adul.  BP recheck by me manual 134/80  Hypertension  This is a chronic problem. The current episode started more than 1 year ago. The problem has been gradually improving since onset. Pertinent negatives include no anxiety, blurred vision, chest pain, headaches, neck pain, orthopnea, PND or shortness of breath. There are no associated agents to hypertension. Risk factors for coronary artery disease include dyslipidemia, family history, male gender, obesity and sedentary lifestyle. Past treatments include ACE inhibitors. Current antihypertension treatment includes ACE inhibitors. The current treatment provides mild improvement. There are no compliance problems.  There is no history of angina, kidney disease, CVA, heart failure, PVD or retinopathy.   Heartburn  He complains of heartburn. He reports no chest pain, no hoarse voice, no stridor, no tooth decay or no water brash. This is a chronic problem. The current episode started more than 1 year ago. The problem occurs occasionally. The problem has been gradually improving. The heartburn duration is less than a minute. The heartburn is of mild intensity. The heartburn does not wake him from sleep. The heartburn does not limit his activity. The heartburn doesn't change with " position. The symptoms are aggravated by certain foods. There are no known risk factors. He has tried an antacid and a PPI for the symptoms. The treatment provided mild relief.   Hyperlipidemia  This is a chronic problem. The current episode started more than 1 year ago. The problem is uncontrolled. Recent lipid tests were reviewed and are high. Exacerbating diseases include obesity. He has no history of diabetes or liver disease. There are no known factors aggravating his hyperlipidemia. Pertinent negatives include no chest pain, myalgias or shortness of breath. Current antihyperlipidemic treatment includes diet change and statins. The current treatment provides mild improvement of lipids. There are no compliance problems.  There are no known risk factors for coronary artery disease.   Insomnia  This is a chronic problem. The current episode started more than 1 year ago. Episode frequency: controlled with cpap. Pertinent negatives include no chest pain, headaches, joint swelling, myalgias, neck pain, swollen glands, urinary symptoms or vertigo. Nothing aggravates the symptoms. He has tried rest and sleep for the symptoms. The treatment provided moderate relief.   Obesity  This is a chronic problem. The current episode started more than 1 year ago. The problem occurs constantly. The problem has been gradually worsening. Pertinent negatives include no chest pain, headaches, joint swelling, myalgias, neck pain, swollen glands, urinary symptoms or vertigo. The symptoms are aggravated by eating. He has tried nothing for the symptoms. The treatment provided no relief.     The following portions of the patient's history were reviewed and updated as appropriate: allergies, current medications, past family history, past medical history, past social history, past surgical history and problem list.      Objective   Vital Signs:  /98 (BP Location: Right arm, Patient Position: Sitting, Cuff Size: Large Adult)   Pulse 71   " Temp 97.8 °F (36.6 °C) (Infrared)   Resp 20   Ht 182.9 cm (72\")   Wt (!) 152 kg (336 lb 3.2 oz) Comment: Steel toe boots  SpO2 100%   BMI 45.60 kg/m²   Estimated body mass index is 45.6 kg/m² as calculated from the following:    Height as of this encounter: 182.9 cm (72\").    Weight as of this encounter: 152 kg (336 lb 3.2 oz).       Class 3 Severe Obesity (BMI >=40). Obesity-related health conditions include the following: hypertension, coronary heart disease, dyslipidemias and GERD. Obesity is improving with lifestyle modifications. BMI is is above average; BMI management plan is completed. We discussed portion control and increasing exercise Answers for HPI/ROS submitted by the patient on 3/8/2023  What is the primary reason for your visit?: Physical    .      Physical Exam  Vitals and nursing note reviewed.   Constitutional:       General: He is awake.      Appearance: Normal appearance. He is well-developed and well-groomed.   HENT:      Head: Normocephalic and atraumatic.      Right Ear: Hearing, tympanic membrane, ear canal and external ear normal.      Left Ear: Hearing, tympanic membrane, ear canal and external ear normal.      Nose: Nose normal.      Mouth/Throat:      Lips: Pink.      Pharynx: Oropharynx is clear.   Eyes:      General: Lids are normal.      Conjunctiva/sclera: Conjunctivae normal.   Cardiovascular:      Rate and Rhythm: Normal rate and regular rhythm.      Heart sounds: Normal heart sounds.   Pulmonary:      Effort: Pulmonary effort is normal.      Breath sounds: Normal breath sounds and air entry.   Musculoskeletal:      Cervical back: Full passive range of motion without pain.      Right lower leg: No edema.      Left lower leg: No edema.   Lymphadenopathy:      Head:      Right side of head: No submental, submandibular or tonsillar adenopathy.      Left side of head: No submental, submandibular or tonsillar adenopathy.   Skin:     General: Skin is warm and dry.   Neurological: "      Mental Status: He is alert.      Sensory: Sensation is intact.      Motor: Motor function is intact.      Coordination: Coordination is intact.      Gait: Gait is intact.   Psychiatric:         Attention and Perception: Attention and perception normal.         Mood and Affect: Mood and affect normal.         Speech: Speech normal.         Behavior: Behavior normal. Behavior is cooperative.         Thought Content: Thought content normal.         Cognition and Memory: Cognition and memory normal.         Judgment: Judgment normal.        Result Review :                   Assessment and Plan   Diagnoses and all orders for this visit:    1. Essential hypertension (Primary)  -     CBC (No Diff)  -     Comprehensive metabolic panel  -     lisinopril (PRINIVIL,ZESTRIL) 20 MG tablet; Take 1 tablet by mouth Daily.  Dispense: 90 tablet; Refill: 1    2. Mixed hyperlipidemia  -     Lipid panel  -     atorvastatin (LIPITOR) 20 MG tablet; Take 1 tablet by mouth Daily.  Dispense: 90 tablet; Refill: 1    3. Impaired fasting glucose  -     Hemoglobin A1c    4. Somers's esophagus without dysplasia  -     pantoprazole (PROTONIX) 40 MG EC tablet; Take 1 tablet by mouth 2 (Two) Times a Day.  Dispense: 180 tablet; Refill: 1    5. Acute pain of right shoulder  -     tiZANidine (ZANAFLEX) 4 MG tablet; Take 1 tablet by mouth Every 8 (Eight) Hours As Needed for Muscle Spasms.  Dispense: 90 tablet; Refill: 1    6. OMERO on CPAP       -    Continue using CPAP nightly as perscribed    7. Morbid (severe) obesity due to excess calories (HCC)  Assessment & Plan:  Patient's (Body mass index is 45.6 kg/m².) indicates that they are morbidly/severely obese (BMI > 40 or > 35 with obesity - related health condition) with health conditions that include hypertension, dyslipidemias and GERD . Weight is worsening. BMI  is above average; BMI management plan is completed. We discussed portion control and increasing exercise.              Follow Up    Return in about 6 months (around 9/15/2023) for Recheck.  Patient was given instructions and counseling regarding his condition or for health maintenance advice. Please see specific information pulled into the AVS if appropriate.     Electronically signed by Ronit Mccabe DNP, APRN, 03/15/23, 1:04 PM CDT.

## 2023-03-15 NOTE — ASSESSMENT & PLAN NOTE
Patient's (Body mass index is 45.6 kg/m².) indicates that they are morbidly/severely obese (BMI > 40 or > 35 with obesity - related health condition) with health conditions that include hypertension, dyslipidemias and GERD . Weight is worsening. BMI  is above average; BMI management plan is completed. We discussed portion control and increasing exercise.

## 2023-03-16 LAB
ALBUMIN SERPL-MCNC: 4.4 G/DL (ref 3.5–5.2)
ALBUMIN/GLOB SERPL: 1.7 G/DL
ALP SERPL-CCNC: 69 U/L (ref 39–117)
ALT SERPL-CCNC: 34 U/L (ref 1–41)
AST SERPL-CCNC: 23 U/L (ref 1–40)
BILIRUB SERPL-MCNC: 0.3 MG/DL (ref 0–1.2)
BUN SERPL-MCNC: 11 MG/DL (ref 6–20)
BUN/CREAT SERPL: 11.5 (ref 7–25)
CALCIUM SERPL-MCNC: 9.7 MG/DL (ref 8.6–10.5)
CHLORIDE SERPL-SCNC: 104 MMOL/L (ref 98–107)
CHOLEST SERPL-MCNC: 94 MG/DL (ref 0–200)
CO2 SERPL-SCNC: 29.1 MMOL/L (ref 22–29)
CREAT SERPL-MCNC: 0.96 MG/DL (ref 0.76–1.27)
EGFRCR SERPLBLD CKD-EPI 2021: 95.1 ML/MIN/1.73
ERYTHROCYTE [DISTWIDTH] IN BLOOD BY AUTOMATED COUNT: 13.9 % (ref 12.3–15.4)
GLOBULIN SER CALC-MCNC: 2.6 GM/DL
GLUCOSE SERPL-MCNC: 112 MG/DL (ref 65–99)
HBA1C MFR BLD: 6 % (ref 4.8–5.6)
HCT VFR BLD AUTO: 44.5 % (ref 37.5–51)
HDLC SERPL-MCNC: 34 MG/DL (ref 40–60)
HGB BLD-MCNC: 14.1 G/DL (ref 13–17.7)
LDLC SERPL CALC-MCNC: 39 MG/DL (ref 0–100)
MCH RBC QN AUTO: 28.7 PG (ref 26.6–33)
MCHC RBC AUTO-ENTMCNC: 31.7 G/DL (ref 31.5–35.7)
MCV RBC AUTO: 90.4 FL (ref 79–97)
PLATELET # BLD AUTO: 286 10*3/MM3 (ref 140–450)
POTASSIUM SERPL-SCNC: 4.6 MMOL/L (ref 3.5–5.2)
PROT SERPL-MCNC: 7 G/DL (ref 6–8.5)
RBC # BLD AUTO: 4.92 10*6/MM3 (ref 4.14–5.8)
SODIUM SERPL-SCNC: 142 MMOL/L (ref 136–145)
TRIGL SERPL-MCNC: 114 MG/DL (ref 0–150)
VLDLC SERPL CALC-MCNC: 21 MG/DL (ref 5–40)
WBC # BLD AUTO: 9.57 10*3/MM3 (ref 3.4–10.8)

## 2023-03-27 DIAGNOSIS — N52.9 ERECTILE DYSFUNCTION, UNSPECIFIED ERECTILE DYSFUNCTION TYPE: ICD-10-CM

## 2023-03-27 RX ORDER — SILDENAFIL 100 MG/1
TABLET, FILM COATED ORAL
Qty: 30 TABLET | Refills: 5 | Status: SHIPPED | OUTPATIENT
Start: 2023-03-27

## 2023-03-27 NOTE — TELEPHONE ENCOUNTER
Rx Refill Note  Requested Prescriptions     Pending Prescriptions Disp Refills   • sildenafil (VIAGRA) 100 MG tablet [Pharmacy Med Name: SILDENAFIL TABS 100MG] 30 tablet 5     Sig: TAKE 1 TABLET DAILY AS NEEDED FOR ERECTILE DYSFUNCTION      Last office visit with prescribing clinician: 3/15/2023   Next office visit with prescribing clinician: 9/22/2023     Last refill: 3/16/2022       Jennie Solano MA  03/27/23, 11:16 CDT

## 2023-05-11 DIAGNOSIS — M25.511 ACUTE PAIN OF RIGHT SHOULDER: ICD-10-CM

## 2023-05-11 RX ORDER — TIZANIDINE 4 MG/1
TABLET ORAL
Qty: 90 TABLET | Refills: 11 | Status: SHIPPED | OUTPATIENT
Start: 2023-05-11

## 2023-05-11 NOTE — TELEPHONE ENCOUNTER
Rx Refill Note  Requested Prescriptions     Pending Prescriptions Disp Refills   • tiZANidine (ZANAFLEX) 4 MG tablet [Pharmacy Med Name: TIZANIDINE HCL TABS 4MG] 90 tablet 11     Sig: TAKE 1 TABLET EVERY 8 HOURS AS NEEDED FOR MUSCLE SPASMS      Last office visit with prescribing clinician: 3/15/2023   Next office visit with prescribing clinician: 9/22/2023       Jennie Solano MA  05/11/23, 16:15 CDT

## 2023-09-11 DIAGNOSIS — I10 ESSENTIAL HYPERTENSION: ICD-10-CM

## 2023-09-11 RX ORDER — LISINOPRIL 20 MG/1
TABLET ORAL
Qty: 90 TABLET | Refills: 3 | Status: SHIPPED | OUTPATIENT
Start: 2023-09-11

## 2023-09-22 ENCOUNTER — OFFICE VISIT (OUTPATIENT)
Dept: FAMILY MEDICINE CLINIC | Facility: CLINIC | Age: 53
End: 2023-09-22
Payer: COMMERCIAL

## 2023-09-22 VITALS
BODY MASS INDEX: 42.66 KG/M2 | HEART RATE: 77 BPM | RESPIRATION RATE: 20 BRPM | OXYGEN SATURATION: 98 % | HEIGHT: 72 IN | DIASTOLIC BLOOD PRESSURE: 77 MMHG | WEIGHT: 315 LBS | TEMPERATURE: 97.8 F | SYSTOLIC BLOOD PRESSURE: 116 MMHG

## 2023-09-22 VITALS
SYSTOLIC BLOOD PRESSURE: 116 MMHG | HEIGHT: 72 IN | DIASTOLIC BLOOD PRESSURE: 77 MMHG | RESPIRATION RATE: 20 BRPM | BODY MASS INDEX: 42.66 KG/M2 | WEIGHT: 315 LBS | OXYGEN SATURATION: 98 % | HEART RATE: 77 BPM | TEMPERATURE: 97.8 F

## 2023-09-22 DIAGNOSIS — E78.2 MIXED HYPERLIPIDEMIA: ICD-10-CM

## 2023-09-22 DIAGNOSIS — E66.01 MORBID (SEVERE) OBESITY DUE TO EXCESS CALORIES: ICD-10-CM

## 2023-09-22 DIAGNOSIS — Z12.5 SCREENING FOR PROSTATE CANCER: ICD-10-CM

## 2023-09-22 DIAGNOSIS — Z11.59 NEED FOR HEPATITIS C SCREENING TEST: ICD-10-CM

## 2023-09-22 DIAGNOSIS — I10 ESSENTIAL HYPERTENSION: Primary | ICD-10-CM

## 2023-09-22 DIAGNOSIS — K22.70 BARRETT'S ESOPHAGUS WITHOUT DYSPLASIA: ICD-10-CM

## 2023-09-22 DIAGNOSIS — N52.9 ERECTILE DYSFUNCTION, UNSPECIFIED ERECTILE DYSFUNCTION TYPE: ICD-10-CM

## 2023-09-22 DIAGNOSIS — Z00.01 ENCOUNTER FOR WELL ADULT EXAM WITH ABNORMAL FINDINGS: Primary | ICD-10-CM

## 2023-09-22 DIAGNOSIS — R73.01 IMPAIRED FASTING GLUCOSE: ICD-10-CM

## 2023-09-22 RX ORDER — PANTOPRAZOLE SODIUM 40 MG/1
40 TABLET, DELAYED RELEASE ORAL 2 TIMES DAILY
Qty: 180 TABLET | Refills: 1 | Status: SHIPPED | OUTPATIENT
Start: 2023-09-22

## 2023-09-22 RX ORDER — LISINOPRIL 20 MG/1
20 TABLET ORAL DAILY
Qty: 90 TABLET | Refills: 3 | Status: SHIPPED | OUTPATIENT
Start: 2023-09-22

## 2023-09-22 RX ORDER — ATORVASTATIN CALCIUM 20 MG/1
20 TABLET, FILM COATED ORAL DAILY
Qty: 90 TABLET | Refills: 1 | Status: SHIPPED | OUTPATIENT
Start: 2023-09-22

## 2023-09-22 RX ORDER — SILDENAFIL 100 MG/1
100 TABLET, FILM COATED ORAL DAILY PRN
Qty: 30 TABLET | Refills: 5 | Status: SHIPPED | OUTPATIENT
Start: 2023-09-22

## 2023-09-22 NOTE — PROGRESS NOTES
Chief Complaint  Hypertension (Patient here for follow up. )    Subjective        Danny Vega presents to [facility]  History of Present Illness    The patient presents to the clinic for hypertension.     He needs a refill on the pantoprazole 40 mg, 2 times a day, 1 in the morning and 1 in the evening. He has enough muscle relaxers at home.     He has chronic hypertension that he has had for more than a year, which has improved and is controlled. He denies any chest pain, shortness of breath, or palpitations. There is no amphetamines, decongestants, or steroids. Coronary risk factors include dyslipidemia, family history, male gender, and obesity. His past treatments and current treatments include lisinopril with mild improvement. No compliance problems.     The patient has chronic gastroesophageal reflux disease which he has had for more than a year, that is intermittent. It is stable since the onset is aggravated by certain foods. He denies any blood stools or increased fatigue. His risk factors include obesity and caffeine use. His treatments include an antacid and proton pump inhibitor.     His hyperlipidemia is chronic which he has had for more than a year. He does not have any chronic renal disease, diabetes, or hypothyroidism. He does have an exacerbating disease and obesity, but nothing aggravates it. He denies chest pain, shortness of breath or leg pain. His current therapy is atorvastatin with mild improvement. He has no complaints or problems. The coronary artery disease risk factors include dyslipidemia, hypertension, male sex, and obesity. He reveals he has a clinician, Dr. Bob Heart that does radiofrequency ablation for Somers's esophagus and has knocked about 90 percent of it out. He revealed that the first time he had the procedure that it hurt at first and then it became like an aggravated or annoying feeling.     The patient had a continuous positive airway  "machine that he uses 7 to 8 hours a night. He reveals he sleeps with it for however long he sleeps that night. He sleeps well and denies feeling fatigued when he wakes up.     The following portions of the patient's history were reviewed and updated as appropriate: allergies, current medications, past family history, past medical history, past social history, past surgical history and problem list.    Patient's (Body mass index is 44.76 kg/m².) indicates that they are morbidly obese (BMI > 40 or > 35 with obesity - related health condition) with health related conditions that include obstructive sleep apnea, hypertension, diabetes mellitus, dyslipidemias, and GERD . Weight is improving with lifestyle modifications. BMI is is above average; BMI management plan is completed. We discussed portion control and increasing exercise.       Objective   Vital Signs:  /77 (BP Location: Right arm, Patient Position: Sitting, Cuff Size: Adult)   Pulse 77   Temp 97.8 °F (36.6 °C) (Infrared)   Resp 20   Ht 182.9 cm (72\")   Wt (!) 150 kg (330 lb)   SpO2 98%   BMI 44.76 kg/m²   Estimated body mass index is 44.76 kg/m² as calculated from the following:    Height as of this encounter: 182.9 cm (72\").    Weight as of this encounter: 150 kg (330 lb).         Physical Exam  Vitals and nursing note reviewed.   Constitutional:       General: He is awake.      Appearance: Normal appearance. He is well-developed and well-groomed.   HENT:      Head: Normocephalic and atraumatic.      Right Ear: Hearing, tympanic membrane, ear canal and external ear normal.      Left Ear: Hearing, tympanic membrane, ear canal and external ear normal.      Nose: Nose normal.      Mouth/Throat:      Lips: Pink.      Pharynx: Oropharynx is clear.   Eyes:      General: Lids are normal.      Conjunctiva/sclera: Conjunctivae normal.   Cardiovascular:      Rate and Rhythm: Normal rate and regular rhythm.      Heart sounds: Normal heart sounds. "   Pulmonary:      Effort: Pulmonary effort is normal.      Breath sounds: Normal breath sounds and air entry.   Musculoskeletal:      Cervical back: Full passive range of motion without pain.      Right lower leg: No edema.      Left lower leg: No edema.   Lymphadenopathy:      Head:      Right side of head: No submental, submandibular or tonsillar adenopathy.      Left side of head: No submental, submandibular or tonsillar adenopathy.   Skin:     General: Skin is warm and dry.   Neurological:      Mental Status: He is alert and oriented to person, place, and time.      Sensory: Sensation is intact.      Motor: Motor function is intact.      Coordination: Coordination is intact.      Gait: Gait is intact.      Comments: Has ticks which he has had for years    Psychiatric:         Attention and Perception: Attention and perception normal.         Mood and Affect: Mood and affect normal.         Speech: Speech normal.         Behavior: Behavior normal. Behavior is cooperative.         Thought Content: Thought content normal.         Cognition and Memory: Cognition and memory normal.         Judgment: Judgment normal.      Result Review :          The patient's blood pressure is 116/77 mm/Hg on 09/22/2023. His last lipid results revealed his cholesterol was 94, triglycerides were 114 and HDL was low at 34. His LDL was normal.          Assessment and Plan   Diagnoses and all orders for this visit:    1. Essential hypertension (Primary)  -     lisinopril (PRINIVIL,ZESTRIL) 20 MG tablet; Take 1 tablet by mouth Daily.  Dispense: 90 tablet; Refill: 3  -     CBC (No Diff)  -     Comprehensive metabolic panel    2. Mixed hyperlipidemia  -     atorvastatin (LIPITOR) 20 MG tablet; Take 1 tablet by mouth Daily.  Dispense: 90 tablet; Refill: 1  -     Lipid panel        -     Eat baked instead of fried or fast foods.    3. Somers's esophagus without dysplasia  -     pantoprazole (PROTONIX) 40 MG EC tablet; Take 1 tablet by mouth  2 (Two) Times a Day.  Dispense: 180 tablet; Refill: 1        -     avoid foods that irritated gerd and barretts    4. Erectile dysfunction, unspecified erectile dysfunction type  -     sildenafil (VIAGRA) 100 MG tablet; Take 1 tablet by mouth Daily As Needed for Erectile Dysfunction.  Dispense: 30 tablet; Refill: 5    5. Morbid (severe) obesity due to excess calories       Obesity Plan:    The patient BMI is outside this range and we recommended/discussed today to utilize a diet/exercise program to get back into the appropriate range.  Federal guidelines recommend that people under the age of 65 should have a BMI of 18.5-24.9  The initial step is to document everything that is consumed into a food diary. Studies have shown that patients can lose up to 2x the weight by keeping track of foods.   Choose one bad food weekly and eliminate it from your diet.  Replace with one healthy food  Goal over next 2-4 weeks is walk 30 minutes per day 5 days per week at pace difficult to hold conversation.   Drink more water, less soda.   Cut back on portion sizes.   Today we encouraged roughly a 1 lb per week weight loss with initial goal of 5% weight loss.  Discussed if eating out for a meal, consider cutting food in half and placing into a to go container.  Individually portion any foods coming into the home based on package.  Use smaller plates  Drinking 12 oz of water 30 minutes before meal as way to suppress appetite.  Medications discussed today include metformin, topamax, phentermine, Qsymia, Belviq (lorcaserin), Contrave (Buproprion/naltrexone).    Lifestyle therapy   Simple advice to lose weight   Internet programs or self help books.    Advice from dietitian  Set Goals that are realistic   Encouraged to use visual aides to help in measuring foods  Baseball-1 cup good for green salad, frozen yogurt, medium piece of fruit, baked potato  Handful - ½ cup good cut fruit, cooked vegetables, pasta, rice  Egg- ¼ cup good for  dried fruit  Deck of cards-3 ounces good for meat and poultry  Check book-3 ounces good for grilled fish  6 dice side by side- 1 ½ ounces good for natural cheese  Simple tips   Plate method-reduce plate size to 9 inch dinner plate.  Half of plate should be filled with non-starchy vegetables (broccoli, lettuce, cauliflower, tomatoes), ¼ plate with lean source of protein (lean chicken, turkey, fish), remaining ½ with whole grains (brown rice, potato, whole grain breads  Avoid liquid calories (regular soda, juice, coffee with cream)  Focus  on water, seltzer water and other non-calorie drinks  Replace regular sugar with non-caloric sweeteners  Avoid skipping meals: plan small regular meals throughout the day in order to keep your hunger controlled  Consider using meal replacements if unable to plan a healthy meal (protein shake, high protein bar)  Replace all white bread with whole wheat/whole grain alternative  Swap regular salad dressings (mayonnaise, butter, or low fat or fat free alternative)  Avoid high fat, high calorie, high carbohydrate snakes (cookies, pastries, cakes)  Snack on fruits, low fat dairy (yogurt, cottage cheese)     6. Impaired fasting glucose  -     Hemoglobin A1c    7. Screening for prostate cancer  -     PSA Screen         Follow Up     Return in about 6 months (around 3/22/2024) for Recheck.    Patient was given instructions and counseling regarding his condition or for health maintenance advice. Please see specific information pulled into the AVS if appropriate.     Answers submitted by the patient for this visit:  Primary Reason for Visit (Submitted on 9/20/2023)  What is the primary reason for your visit?: Physical        Transcribed from ambient dictation for Ronit Mccabe, YURIY, APRN by Lina Sanderson.  08:50 CDT

## 2023-09-22 NOTE — PROGRESS NOTES
CC: adult well    Subjective    Danny Vega is a 53 y.o. male who presents for an well adult     The following portions of the patient's history were reviewed and updated as appropriate: allergies, current medications, past family history, past medical history, past social history, past surgical history, and problem list.    Compared to one year ago, the patient feels his physical health is the same.    Compared to one year ago, the patient feels his mental health is the same.    Recent Hospitalizations:  He was not admitted to the hospital during the last year.     Current Medical Providers:  Patient Care Team:  Ronit Mccabe DNP, CHUY as PCP - General  Ronit Mccabe DNP, APRN as PCP - Family Medicine    Outpatient Medications Prior to Visit   Medication Sig Dispense Refill    aspirin 81 MG EC tablet Take 1 tablet by mouth Daily.      glucosamine sulfate 500 MG capsule capsule Take  by mouth Daily.      Multiple Vitamins-Minerals (MULTIVITAMIN PO) Take  by mouth Daily.      tiZANidine (ZANAFLEX) 4 MG tablet TAKE 1 TABLET EVERY 8 HOURS AS NEEDED FOR MUSCLE SPASMS 90 tablet 11    atorvastatin (LIPITOR) 20 MG tablet Take 1 tablet by mouth Daily. 90 tablet 1    lisinopril (PRINIVIL,ZESTRIL) 20 MG tablet TAKE 1 TABLET DAILY 90 tablet 3    pantoprazole (PROTONIX) 40 MG EC tablet Take 1 tablet by mouth 2 (Two) Times a Day. 180 tablet 1    sildenafil (VIAGRA) 100 MG tablet TAKE 1 TABLET DAILY AS NEEDED FOR ERECTILE DYSFUNCTION 30 tablet 5     No facility-administered medications prior to visit.     No opioid medication identified on active medication list. I have reviewed chart for other potential  high risk medication/s and harmful drug interactions in the elderly.      Aspirin is on active medication list. Aspirin use is indicated based on review of current medical condition/s. Pros and cons of this therapy have been discussed today. Benefits of this medication outweigh potential harm.  Patient has been  "encouraged to continue taking this medication.  .      Patient Active Problem List   Diagnosis    Gastroesophageal reflux disease    Somers's esophagus    History of tics    OMERO on CPAP    Somers's esophagus    Morbid (severe) obesity due to excess calories    Mixed hyperlipidemia    Acute pain of right shoulder    Essential hypertension    Primary osteoarthritis of both knees     Advance Care Planning   Advance Care Planning     Advance Directive is not on file.  ACP discussion was held with the patient during this visit. Patient has an advance directive (not in EMR), copy requested.     Objective    Vitals:    23 0716   BP: 116/77   BP Location: Right arm   Patient Position: Sitting   Cuff Size: Large Adult   Pulse: 77   Resp: 20   Temp: 97.8 °F (36.6 °C)   TempSrc: Oral   SpO2: 98%   Weight: (!) 150 kg (330 lb)   Height: 182.9 cm (72\")   PainSc:   6   PainLoc: Back     Estimated body mass index is 44.76 kg/m² as calculated from the following:    Height as of this encounter: 182.9 cm (72\").    Weight as of this encounter: 150 kg (330 lb).    Does the patient have evidence of cognitive impairment? No    HEALTH RISK ASSESSMENT    Smoking Status:  Social History     Tobacco Use   Smoking Status Former    Packs/day: 1.00    Years: 2.00    Pack years: 2.00    Types: Cigarettes, Cigars    Start date: 2003    Quit date: 2004    Years since quittin.7   Smokeless Tobacco Never   Tobacco Comments    Longtime snuff user     Alcohol Consumption:  Social History     Substance and Sexual Activity   Alcohol Use Yes     Fall Risk Screen: not at risk for fall  STEADI Fall Risk Assessment has not been completed.    Depression Screening:      3/15/2023     7:22 AM   PHQ-2/PHQ-9 Depression Screening   Little Interest or Pleasure in Doing Things 0-->not at all   Feeling Down, Depressed or Hopeless 0-->not at all   PHQ-9: Brief Depression Severity Measure Score 0     Health Habits and Functional and Cognitive " Screening: no cognitive decline    Age-appropriate Screening Schedule:  Refer to the list below for future screening recommendations based on patient's age, sex and/or medical conditions. Orders for these recommended tests are listed in the plan section. The patient has been provided with a written plan.    Health Maintenance   Topic Date Due    TDAP/TD VACCINES (1 - Tdap) Never done    HEPATITIS C SCREENING  Never done    ZOSTER VACCINE (1 of 2) Never done    ANNUAL PHYSICAL  09/16/2023    LIPID PANEL  03/15/2024    BMI FOLLOWUP  09/22/2024    COLORECTAL CANCER SCREENING  09/24/2030    Pneumococcal Vaccine 0-64  Aged Out    COVID-19 Vaccine  Discontinued    INFLUENZA VACCINE  Discontinued      Physical Exam  Vitals and nursing note reviewed.   Constitutional:       General: He is awake.      Appearance: Normal appearance. He is well-developed and well-groomed.   HENT:      Head: Normocephalic and atraumatic.      Right Ear: Hearing, tympanic membrane, ear canal and external ear normal.      Left Ear: Hearing, tympanic membrane, ear canal and external ear normal.      Nose: Nose normal.      Mouth/Throat:      Lips: Pink.      Pharynx: Oropharynx is clear.   Eyes:      General: Lids are normal.      Conjunctiva/sclera: Conjunctivae normal.   Cardiovascular:      Rate and Rhythm: Normal rate and regular rhythm.      Heart sounds: Normal heart sounds.   Pulmonary:      Effort: Pulmonary effort is normal.      Breath sounds: Normal breath sounds and air entry.   Musculoskeletal:      Cervical back: Full passive range of motion without pain.      Right lower leg: No edema.      Left lower leg: No edema.   Lymphadenopathy:      Head:      Right side of head: No submental, submandibular or tonsillar adenopathy.      Left side of head: No submental, submandibular or tonsillar adenopathy.   Skin:     General: Skin is warm and dry.   Neurological:      Mental Status: He is alert and oriented to person, place, and time.       Sensory: Sensation is intact.      Motor: Motor function is intact.      Coordination: Coordination is intact.      Gait: Gait is intact.      Comments: Hs ticks    Psychiatric:         Attention and Perception: Attention and perception normal.         Mood and Affect: Mood and affect normal.         Speech: Speech normal.         Behavior: Behavior normal. Behavior is cooperative.         Thought Content: Thought content normal.         Cognition and Memory: Cognition and memory normal.         Judgment: Judgment normal.          Assessment and Plan   Diagnoses and all orders for this visit:    1. Encounter for well adult exam with abnormal findings (Primary)    2. Morbid (severe) obesity due to excess calories      Patient's (Body mass index is 44.76 kg/m².) indicates that they are morbidly obese (BMI > 40 or > 35 with obesity - related health condition) with health related conditions that include hypertension, dyslipidemias, and GERD . Weight is unchanged. BMI is is above average; BMI management plan is completed. We discussed portion control and increasing exercise.        Health Maintenance Summary    Ordered - HEPATITIS C SCREENING: (Once)Ordered on 9/22/2023 03/16/2022  Postponed until 3/16/2023 by Ronit Mccabe DNP, APRN (Patient Refused)    03/09/2021  Postponed until 4/21/2021 by Ronit Mccabe DNP, APRN (Pending event)      Postponed - TDAP/TD VACCINES: (1 - Tdap): Postponed until 10/2/2023  09/22/2023  Postponed until 10/2/2023 by Ronit Mccabe DNP, APRN (Pending event)    03/16/2022  Postponed until 3/16/2023 by Ronit Mccabe DNP, APRN (Patient Refused)    03/26/2021  Postponed until 3/26/2021 by Ronit Mccabe DNP, APRN (Patient Refused)    03/09/2021  Postponed until 3/9/2021 by Ronit Mccabe DNP, APRN (Patient Refused)    10/15/2019  Postponed until 10/15/2019 by Ronit Mccabe DNP, APRN (Patient Refused)         Postponed - ZOSTER VACCINE: (1 of 2): Postponed  "until 10/7/2024  09/22/2023  Postponed until 10/7/2024 by Ronit Mccabe DNP, APRN (Pending event)    03/16/2022  Postponed until 3/16/2023 by Ronit Mccabe DNP, CHUY (Pending event)    03/09/2021  Postponed until 3/9/2021 by Ronit Mccabe DNP, APRN (Patient Refused)      Ordered - LIPID PANEL: (Yearly): Ordered on 9/22/2023  03/15/2023  Lipid panel    09/16/2022  Lipid panel    03/16/2022  Lipid panel    09/17/2021  Lipid panel    03/10/2021  Lipid panel         ANNUAL PHYSICAL: (Yearly): Next due on 9/22/2024 09/22/2023  Done    09/16/2022  Done    10/15/2019  Done      BMI FOLLOWUP: (Yearly): Next due on 9/22/2024 09/22/2023  Smart[employer]: WORKFLOW - QUALITY MEASUREMENT - BMI FOLLOW UP CARE PLAN DOCUMENTED    03/15/2023  SmartData: WORKFLOW - QUALITY MEASUREMENT - BMI FOLLOW UP CARE PLAN DOCUMENTED    09/16/2022  SmartData: WORKFLOW - QUALITY MEASUREMENT - BMI FOLLOW UP CARE PLAN DOCUMENTED    09/17/2021  SmartData: WORKFLOW - QUALITY MEASUREMENT - BMI FOLLOW UP CARE PLAN DOCUMENTED    03/09/2021  SmartData: WORKFLOW - QUALITY MEASUREMENT - BMI FOLLOW UP CARE PLAN DOCUMENTED      COLORECTAL CANCER SCREENING: (COLONOSCOPY - Every 10 Years)Next due on 9/24/2030 09/24/2020  COLONOSCOPY (Done)    09/23/2020  Outside Procedure: UT COLONOSCOPY FLX DX W/COLLJ SPEC WHEN PFRMD      Pneumococcal Vaccine 0-64: (Series Information): Aged Out  No completion, postpone, frequency change, or communication history exists for this topic.     Discontinued - COVID-19 Vaccine: says to take it off his chart he is not getting them  9/16/2022  Frequency changed to Never by Ronit Mccabe DNP, CHUY (states, \"I will not take another covid vaccinations)    11/24/2021  Imm Admin: COVID-19 (DOROTHY)    04/01/2021  Imm Admin: COVID-19 (DOROTHY)      Discontinued - INFLUENZA VACCINE:   States, \"Please remove from chart, I don't get flu shots. Discontinued  12/12/2018  Frequency changed to Never by Ronit Mccabe DNP, " APRN (Does not take vaccines)    12/06/2017  Declined    11/04/2013  Imm Admin: Influenza, Unspecified    11/04/2013  Imm Admin: Influenza TIV (IM)      HEALTH PROMOTION/ PREVENTION  I discussed and encouraged age appropriate health promotion/ prevention screenings and immunizations specific to this client: zoster vaccine, Tdap, lipid panel, hepatitis C screening,   Hg A1C, colonoscopy, the patient declines these recommendations at this time.           Follow Up     Return in about 1 year (around 9/22/2024).      Patient was given instructions and counseling regarding his condition or for health maintenance advice. Please see specific information pulled into the AVS if appropriate.       The above risks/problems have been discussed with the patient.  Pertinent information has been shared with the patient in the After Visit Summary.    Electronically signed by Ronit Mccabe DNP, APRN, 09/22/23, 8:03 AM CDT.

## 2023-09-23 LAB
ALBUMIN SERPL-MCNC: 4.3 G/DL (ref 3.8–4.9)
ALBUMIN/GLOB SERPL: 1.5 {RATIO} (ref 1.2–2.2)
ALP SERPL-CCNC: 70 IU/L (ref 44–121)
ALT SERPL-CCNC: 31 IU/L (ref 0–44)
AST SERPL-CCNC: 26 IU/L (ref 0–40)
BILIRUB SERPL-MCNC: 0.5 MG/DL (ref 0–1.2)
BUN SERPL-MCNC: 14 MG/DL (ref 6–24)
BUN/CREAT SERPL: 15 (ref 9–20)
CALCIUM SERPL-MCNC: 9.4 MG/DL (ref 8.7–10.2)
CHLORIDE SERPL-SCNC: 102 MMOL/L (ref 96–106)
CHOLEST SERPL-MCNC: 113 MG/DL (ref 100–199)
CO2 SERPL-SCNC: 24 MMOL/L (ref 20–29)
CREAT SERPL-MCNC: 0.95 MG/DL (ref 0.76–1.27)
EGFRCR SERPLBLD CKD-EPI 2021: 96 ML/MIN/1.73
ERYTHROCYTE [DISTWIDTH] IN BLOOD BY AUTOMATED COUNT: 14.1 % (ref 11.6–15.4)
GLOBULIN SER CALC-MCNC: 2.8 G/DL (ref 1.5–4.5)
GLUCOSE SERPL-MCNC: 113 MG/DL (ref 70–99)
HBA1C MFR BLD: 6 % (ref 4.8–5.6)
HCT VFR BLD AUTO: 43 % (ref 37.5–51)
HCV IGG SERPL QL IA: NON REACTIVE
HDLC SERPL-MCNC: 35 MG/DL
HGB BLD-MCNC: 14.4 G/DL (ref 13–17.7)
LDLC SERPL CALC-MCNC: 53 MG/DL (ref 0–99)
MCH RBC QN AUTO: 29.6 PG (ref 26.6–33)
MCHC RBC AUTO-ENTMCNC: 33.5 G/DL (ref 31.5–35.7)
MCV RBC AUTO: 88 FL (ref 79–97)
PLATELET # BLD AUTO: 246 X10E3/UL (ref 150–450)
POTASSIUM SERPL-SCNC: 4.4 MMOL/L (ref 3.5–5.2)
PROT SERPL-MCNC: 7.1 G/DL (ref 6–8.5)
PSA SERPL-MCNC: 0.7 NG/ML (ref 0–4)
RBC # BLD AUTO: 4.87 X10E6/UL (ref 4.14–5.8)
SODIUM SERPL-SCNC: 141 MMOL/L (ref 134–144)
TRIGL SERPL-MCNC: 140 MG/DL (ref 0–149)
VLDLC SERPL CALC-MCNC: 25 MG/DL (ref 5–40)
WBC # BLD AUTO: 8.2 X10E3/UL (ref 3.4–10.8)

## 2023-10-03 ENCOUNTER — TELEPHONE (OUTPATIENT)
Dept: FAMILY MEDICINE CLINIC | Facility: CLINIC | Age: 53
End: 2023-10-03
Payer: COMMERCIAL

## 2023-10-03 DIAGNOSIS — R73.03 PRE-DIABETES: Primary | ICD-10-CM

## 2023-10-03 DIAGNOSIS — R73.03 PRE-DIABETES: ICD-10-CM

## 2023-10-03 RX ORDER — METFORMIN HYDROCHLORIDE 500 MG/1
500 TABLET, EXTENDED RELEASE ORAL
Qty: 90 TABLET | Refills: 0 | Status: SHIPPED | OUTPATIENT
Start: 2023-10-03 | End: 2023-10-04 | Stop reason: SDUPTHER

## 2023-10-03 NOTE — TELEPHONE ENCOUNTER
Caller: Danny Vega    Relationship: Self    Best call back number: [phone]     What is the best time to reach you: ANY    Who are you requesting to speak with (clinical staff, provider,  specific staff member): CLINICAL    What was the call regarding:     PATIENT ORIGINALLY DECLINED A PRESCRIPTION FOR A PRE-DIABETIC MEDICATION. HOWEVER, PATIENT STATES HE WOULD LIKE TO MOVE FORWARD WITH THIS PRESCRIPTION.    PLEASE FOLLOW-UP WITH PATIENT REGARDING THIS MEDICATION.     Is it okay if the provider responds through MyChart: NO

## 2023-10-04 NOTE — TELEPHONE ENCOUNTER
Called and notified pt that Ronit sent in Metformin for him. Pt states that he needs this to go to [facility] and not [facility]. I informed him I would send a message to Ronit.     Pt would like a [employer] message sent once this is completed.

## 2023-10-06 RX ORDER — METFORMIN HYDROCHLORIDE 500 MG/1
500 TABLET, EXTENDED RELEASE ORAL
Qty: 90 TABLET | Refills: 0 | Status: SHIPPED | OUTPATIENT
Start: 2023-10-06

## 2023-10-16 DIAGNOSIS — K22.70 BARRETT'S ESOPHAGUS WITHOUT DYSPLASIA: ICD-10-CM

## 2023-10-17 RX ORDER — PANTOPRAZOLE SODIUM 40 MG/1
40 TABLET, DELAYED RELEASE ORAL 2 TIMES DAILY
Qty: 180 TABLET | Refills: 3 | OUTPATIENT
Start: 2023-10-17

## 2023-10-17 NOTE — TELEPHONE ENCOUNTER
Refill too soon     Rx Refill Note  Requested Prescriptions     Pending Prescriptions Disp Refills    pantoprazole (PROTONIX) 40 MG EC tablet [Pharmacy Med Name: PANTOPRAZOLE SODIUM DR TABS 40MG] 180 tablet 3     Sig: TAKE 1 TABLET TWICE A DAY      Last office visit with prescribing clinician: 9/22/2023   Next office visit with prescribing clinician: 3/22/2024                         Would you like a call back once the refill request has been completed: [] Yes [] No    If the office needs to give you a call back, can they leave a voicemail: [] Yes [] No    Kena Latham MA  10/17/23, 09:06 CDT

## 2023-10-18 DIAGNOSIS — K22.70 BARRETT'S ESOPHAGUS WITHOUT DYSPLASIA: ICD-10-CM

## 2023-10-18 RX ORDER — PANTOPRAZOLE SODIUM 40 MG/1
40 TABLET, DELAYED RELEASE ORAL 2 TIMES DAILY
Qty: 180 TABLET | Refills: 3 | OUTPATIENT
Start: 2023-10-18

## 2023-10-18 NOTE — TELEPHONE ENCOUNTER
Too early to refill. Last filled on 09/22/2023  Rx Refill Note  Requested Prescriptions     Pending Prescriptions Disp Refills    pantoprazole (PROTONIX) 40 MG EC tablet [Pharmacy Med Name: PANTOPRAZOLE SODIUM DR TABS 40MG] 180 tablet 3     Sig: TAKE 1 TABLET TWICE A DAY      Last office visit with prescribing clinician: 9/22/2023   Last telemedicine visit with prescribing clinician: Visit date not found   Next office visit with prescribing clinician: 3/22/2024                         Would you like a call back once the refill request has been completed: [] Yes [] No    If the office needs to give you a call back, can they leave a voicemail: [] Yes [] No    Genia Heart LPN  10/18/23, 12:26 CDT

## 2023-10-19 DIAGNOSIS — N52.9 ERECTILE DYSFUNCTION, UNSPECIFIED ERECTILE DYSFUNCTION TYPE: ICD-10-CM

## 2023-10-19 DIAGNOSIS — I10 ESSENTIAL HYPERTENSION: ICD-10-CM

## 2023-10-19 DIAGNOSIS — K22.70 BARRETT'S ESOPHAGUS WITHOUT DYSPLASIA: ICD-10-CM

## 2023-10-19 DIAGNOSIS — E78.2 MIXED HYPERLIPIDEMIA: ICD-10-CM

## 2023-10-19 RX ORDER — ATORVASTATIN CALCIUM 20 MG/1
20 TABLET, FILM COATED ORAL DAILY
Qty: 90 TABLET | Refills: 1 | Status: SHIPPED | OUTPATIENT
Start: 2023-10-19

## 2023-10-19 RX ORDER — LISINOPRIL 20 MG/1
20 TABLET ORAL DAILY
Qty: 90 TABLET | Refills: 3 | Status: SHIPPED | OUTPATIENT
Start: 2023-10-19

## 2023-10-19 RX ORDER — SILDENAFIL 100 MG/1
100 TABLET, FILM COATED ORAL DAILY PRN
Qty: 30 TABLET | Refills: 5 | Status: SHIPPED | OUTPATIENT
Start: 2023-10-19

## 2023-10-19 RX ORDER — PANTOPRAZOLE SODIUM 40 MG/1
40 TABLET, DELAYED RELEASE ORAL 2 TIMES DAILY
Qty: 180 TABLET | Refills: 1 | Status: SHIPPED | OUTPATIENT
Start: 2023-10-19

## 2023-11-15 ENCOUNTER — OFFICE VISIT (OUTPATIENT)
Dept: FAMILY MEDICINE CLINIC | Facility: CLINIC | Age: 53
End: 2023-11-15
Payer: COMMERCIAL

## 2023-11-15 VITALS
WEIGHT: 315 LBS | SYSTOLIC BLOOD PRESSURE: 138 MMHG | RESPIRATION RATE: 18 BRPM | HEIGHT: 72 IN | BODY MASS INDEX: 42.66 KG/M2 | TEMPERATURE: 98.7 F | OXYGEN SATURATION: 99 % | HEART RATE: 91 BPM | DIASTOLIC BLOOD PRESSURE: 87 MMHG

## 2023-11-15 DIAGNOSIS — J01.00 SUBACUTE MAXILLARY SINUSITIS: ICD-10-CM

## 2023-11-15 DIAGNOSIS — J40 BRONCHITIS: Primary | ICD-10-CM

## 2023-11-15 RX ORDER — DEXTROMETHORPHAN HYDROBROMIDE AND PROMETHAZINE HYDROCHLORIDE 15; 6.25 MG/5ML; MG/5ML
5 SYRUP ORAL 4 TIMES DAILY PRN
Qty: 120 ML | Refills: 0 | Status: SHIPPED | OUTPATIENT
Start: 2023-11-15

## 2023-11-15 RX ORDER — DEXAMETHASONE SODIUM PHOSPHATE 10 MG/ML
10 INJECTION INTRAMUSCULAR; INTRAVENOUS ONCE
Status: COMPLETED | OUTPATIENT
Start: 2023-11-15 | End: 2023-11-15

## 2023-11-15 RX ORDER — AZITHROMYCIN 250 MG/1
TABLET, FILM COATED ORAL
Qty: 6 TABLET | Refills: 0 | Status: SHIPPED | OUTPATIENT
Start: 2023-11-15

## 2023-11-15 RX ADMIN — DEXAMETHASONE SODIUM PHOSPHATE 10 MG: 10 INJECTION INTRAMUSCULAR; INTRAVENOUS at 11:55

## 2023-11-15 NOTE — PROGRESS NOTES
"Chief Complaint  Cough and URI    Subjective        Danny Vega presents to [facility]  History of Present Illness  The patient is a 53-year-old male who presents for evaluation of cough.    He has a cough. This morning, he was congested. He had some green phlegm. His head is dull. It feels like an upper respiratory infection. He denies any fever or chills. He is blowing out clear mucus from his nose. He is eating and drinking okay. He would like a steroid injection. His symptoms started on Monday night at work. He could feel a burning sensation in his back. He did not call yesterday and today, but it was not too bad. He has Tylenol Cold at home.    The following portions of the patient's history were reviewed and updated as appropriate: allergies, current medications, past family history, past medical history, past social history, past surgical history and problem list.    Class 3 Severe Obesity (BMI >=40). Obesity-related health conditions include the following: hypertension, dyslipidemias, and GERD. Obesity is unchanged. BMI is is above average; BMI management plan is completed. We discussed portion control and increasing exercise.      Objective   Vital Signs:  /87 (BP Location: Right arm, Patient Position: Sitting, Cuff Size: Large Adult)   Pulse 91   Temp 98.7 °F (37.1 °C) (Infrared)   Resp 18   Ht 182.9 cm (72\")   Wt (!) 150 kg (330 lb)   SpO2 99%   BMI 44.76 kg/m²   Estimated body mass index is 44.76 kg/m² as calculated from the following:    Height as of this encounter: 182.9 cm (72\").    Weight as of this encounter: 150 kg (330 lb).               Physical Exam  Vitals and nursing note reviewed.   Constitutional:       General: He is not in acute distress.     Appearance: Normal appearance. He is well-developed. He is not diaphoretic.   HENT:      Head: Normocephalic and atraumatic.      Right Ear: Tympanic membrane, ear canal and external ear normal.      " Left Ear: Tympanic membrane, ear canal and external ear normal.      Nose: Congestion present.      Right Sinus: Maxillary sinus tenderness present.      Left Sinus: Maxillary sinus tenderness present.      Mouth/Throat:      Pharynx: Uvula midline. Posterior oropharyngeal erythema present. No oropharyngeal exudate.      Tonsils: No tonsillar exudate.   Eyes:      General: Lids are normal. Lids are everted, no foreign bodies appreciated.      Conjunctiva/sclera: Conjunctivae normal.      Pupils: Pupils are equal, round, and reactive to light.   Neck:      Thyroid: No thyromegaly.      Trachea: Trachea normal.   Cardiovascular:      Rate and Rhythm: Normal rate and regular rhythm.      Heart sounds: Normal heart sounds, S1 normal and S2 normal.   Pulmonary:      Effort: Pulmonary effort is normal.      Breath sounds: Normal breath sounds.   Abdominal:      General: Bowel sounds are normal.      Palpations: Abdomen is soft.   Musculoskeletal:         General: Normal range of motion.      Cervical back: Full passive range of motion without pain, normal range of motion and neck supple.   Skin:     General: Skin is warm and dry.      Capillary Refill: Capillary refill takes less than 2 seconds.   Neurological:      Mental Status: He is alert and oriented to person, place, and time.   Psychiatric:         Speech: Speech normal.         Behavior: Behavior normal. Behavior is cooperative.         Thought Content: Thought content normal.         Judgment: Judgment normal.        Result Review :                   Assessment and Plan   Diagnoses and all orders for this visit:    1. Bronchitis (Primary)  -     dexAMETHasone (DECADRON) injection 10 mg  -     promethazine-dextromethorphan (PROMETHAZINE-DM) 6.25-15 MG/5ML syrup; Take 5 mL by mouth 4 (Four) Times a Day As Needed for Cough.  Dispense: 120 mL; Refill: 0    2. Subacute maxillary sinusitis  -     azithromycin (Zithromax) 250 MG tablet; Take 2 tablets the first day,  then 1 tablet daily for 4 days.  Dispense: 6 tablet; Refill: 0  -     promethazine-dextromethorphan (PROMETHAZINE-DM) 6.25-15 MG/5ML syrup; Take 5 mL by mouth 4 (Four) Times a Day As Needed for Cough.  Dispense: 120 mL; Refill: 0             Follow Up   Return in about 1 week (around 11/22/2023), or if symptoms worsen or fail to improve.  Patient was given instructions and counseling regarding his condition or for health maintenance advice. Please see specific information pulled into the AVS if appropriate.     Transcribed from ambient dictation for Ronit Mccabe DNP, APRCHRISTOPHER by Marlee Pham.  11/15/23   11:27 CST    Patient or patient representative verbalized consent to the visit recording.  I have personally performed the services described in this document as transcribed by the above individual, and it is both accurate and complete.      Electronically signed by Ronit Mccabe DNP, CHUY, 11/17/23, 10:50 PM CST.

## 2024-01-09 DIAGNOSIS — R73.03 PRE-DIABETES: ICD-10-CM

## 2024-01-09 RX ORDER — METFORMIN HYDROCHLORIDE 500 MG/1
500 TABLET, EXTENDED RELEASE ORAL
Qty: 90 TABLET | Refills: 3 | OUTPATIENT
Start: 2024-01-09

## 2024-01-09 NOTE — TELEPHONE ENCOUNTER
Refill too soon  Rx Refill Note  Requested Prescriptions     Pending Prescriptions Disp Refills    metFORMIN ER (GLUCOPHAGE-XR) 500 MG 24 hr tablet [Pharmacy Med Name: METFORMIN HCL ER TABS 500MG] 90 tablet 3     Sig: TAKE 1 TABLET DAILY WITH BREAKFAST      Last office visit with prescribing clinician: 11/15/2023   Last telemedicine visit with prescribing clinician: Visit date not found   Next office visit with prescribing clinician: 3/22/2024                         Would you like a call back once the refill request has been completed: [] Yes [] No    If the office needs to give you a call back, can they leave a voicemail: [] Yes [] No    Nguyen Anderson, PCT  01/09/24, 08:15 CST

## 2024-01-17 DIAGNOSIS — R73.03 PRE-DIABETES: ICD-10-CM

## 2024-01-17 RX ORDER — METFORMIN HYDROCHLORIDE 500 MG/1
500 TABLET, EXTENDED RELEASE ORAL
Qty: 90 TABLET | Refills: 1 | Status: SHIPPED | OUTPATIENT
Start: 2024-01-17

## 2024-01-17 NOTE — TELEPHONE ENCOUNTER
Rx Refill Note  Requested Prescriptions     Pending Prescriptions Disp Refills    metFORMIN ER (GLUCOPHAGE-XR) 500 MG 24 hr tablet 90 tablet 0     Sig: Take 1 tablet by mouth Daily With Breakfast.      Last office visit with prescribing clinician: 11/15/2023   Last telemedicine visit with prescribing clinician: Visit date not found   Next office visit with prescribing clinician: 3/22/2024                         Would you like a call back once the refill request has been completed: [] Yes [] No    If the office needs to give you a call back, can they leave a voicemail: [] Yes [] No    Genia Heart LPN  01/17/24, 09:27 CST

## 2024-03-22 ENCOUNTER — OFFICE VISIT (OUTPATIENT)
Dept: FAMILY MEDICINE CLINIC | Facility: CLINIC | Age: 54
End: 2024-03-22
Payer: COMMERCIAL

## 2024-03-22 VITALS
WEIGHT: 315 LBS | RESPIRATION RATE: 18 BRPM | SYSTOLIC BLOOD PRESSURE: 128 MMHG | DIASTOLIC BLOOD PRESSURE: 80 MMHG | HEIGHT: 72 IN | OXYGEN SATURATION: 99 % | BODY MASS INDEX: 42.66 KG/M2 | HEART RATE: 70 BPM | TEMPERATURE: 98.6 F

## 2024-03-22 DIAGNOSIS — E11.65 TYPE 2 DIABETES MELLITUS WITH HYPERGLYCEMIA, WITHOUT LONG-TERM CURRENT USE OF INSULIN: Primary | ICD-10-CM

## 2024-03-22 DIAGNOSIS — Z76.89 ENCOUNTER FOR WEIGHT MANAGEMENT: ICD-10-CM

## 2024-03-22 DIAGNOSIS — E78.2 MIXED HYPERLIPIDEMIA: ICD-10-CM

## 2024-03-22 DIAGNOSIS — E66.01 CLASS 3 SEVERE OBESITY DUE TO EXCESS CALORIES WITH SERIOUS COMORBIDITY IN ADULT, UNSPECIFIED BMI: ICD-10-CM

## 2024-03-22 DIAGNOSIS — F41.9 ANXIETY: ICD-10-CM

## 2024-03-22 DIAGNOSIS — I10 ESSENTIAL HYPERTENSION: ICD-10-CM

## 2024-03-22 DIAGNOSIS — K22.70 BARRETT'S ESOPHAGUS WITHOUT DYSPLASIA: ICD-10-CM

## 2024-03-22 DIAGNOSIS — K21.00 GASTROESOPHAGEAL REFLUX DISEASE WITH ESOPHAGITIS WITHOUT HEMORRHAGE: ICD-10-CM

## 2024-03-22 DIAGNOSIS — M25.511 ACUTE PAIN OF RIGHT SHOULDER: ICD-10-CM

## 2024-03-22 RX ORDER — DULOXETIN HYDROCHLORIDE 20 MG/1
20 CAPSULE, DELAYED RELEASE ORAL DAILY
Qty: 30 CAPSULE | Refills: 0 | Status: SHIPPED | OUTPATIENT
Start: 2024-03-22

## 2024-03-22 RX ORDER — PANTOPRAZOLE SODIUM 40 MG/1
40 TABLET, DELAYED RELEASE ORAL 2 TIMES DAILY
Qty: 180 TABLET | Refills: 1 | Status: SHIPPED | OUTPATIENT
Start: 2024-03-22

## 2024-03-22 RX ORDER — TIZANIDINE 4 MG/1
4 TABLET ORAL EVERY 8 HOURS PRN
Qty: 90 TABLET | Refills: 1 | Status: SHIPPED | OUTPATIENT
Start: 2024-03-22

## 2024-03-22 RX ORDER — ATORVASTATIN CALCIUM 20 MG/1
20 TABLET, FILM COATED ORAL DAILY
Qty: 90 TABLET | Refills: 1 | Status: SHIPPED | OUTPATIENT
Start: 2024-03-22

## 2024-03-22 NOTE — PROGRESS NOTES
Chief Complaint  Hypertension (Pt here for follow up)    Subjective        Danny Vega presents to [facility]  Hypertension    The patient is a 53-year-old male who presents for follow up of multiple medical concerns.    He has lost 14 pounds since his last visit. He has been eating less. He goes back to work and does extra ambulating at work.    His hypertension is chronic. He has had hypertension more than 1 year. Hypertension is controlled with the reading today of 128/80 mmHg. Associated symptoms, blurred vision, chest pain, headaches, fatigue, no peripheral edema, no shortness of breath. Those were all negative. He does have anxiety. He does not have any agents associated with hypertension. CAD risk, dyslipidemia, family history, male gender, obesity, sedentary lifestyle and stress. Past treatments have been with lisinopril. Current therapies with lisinopril with improvement.    His hyperlipidemia is chronic. He has had hyperlipidemia more than 1 year. Last lipid panel, cholesterol, triglycerides, and LDL were all normal. The HDL was low. His lipids were a little high. No leg pain, no chest pain, no myalgias. Current therapy is atorvastatin with mild improvement. No compliance problems. He is on metformin.    His GERD is getting worse. He denies any belching, heartburn, or globus sensation. He denies any nausea. He denies any hematemesis. He has never had H. pylori. He has had an EGD.    He is actually in prediabetes mellitus. He has had no polydipsia and no polyphagia; however, polyuria is positive. Symptom course is stable. Again, he has not had any hypoglycemic symptoms of dizziness, headaches, sleepiness, mood changes. No hypoglycemic complications of hospitalization or blackouts. He does not have any diabetic complications at this time. Progression is improved. He does not take insulin. He does not check his blood sugars. His weight trend is decreasing steadily. He is  "trying to eat healthier. He has done Weight Watchers. He is still eating 2 sandwiches. He is exercising and ambulating more than usual. He is on metformin.    He has decreased concentration, excessive worry, and restlessness. His daughter is in [employer] high and they are having a lot of issues, so he is needing something to help it. His stress is moderate on most days. He gets 5 to 7 hours of sleep a night. He wakes up 2 times. He takes Advil PM, which helps some.    The following portions of the patient's history were reviewed and updated as appropriate: allergies, current medications, past family history, past medical history, past social history, past surgical history and problem list.    Class 3 Severe Obesity (BMI >=40). Obesity-related health conditions include the following: hypertension, dyslipidemias, and GERD. Obesity is improving with lifestyle modifications. BMI is is above average; BMI management plan is completed. We discussed portion control and increasing exercise.     Objective   Vital Signs:  /80 (BP Location: Right arm, Patient Position: Sitting, Cuff Size: Large Adult)   Pulse 70   Temp 98.6 °F (37 °C) (Infrared)   Resp 18   Ht 182.9 cm (72\") Comment: per patient  Wt (!) 143 kg (316 lb)   SpO2 99%   BMI 42.86 kg/m²   Estimated body mass index is 42.86 kg/m² as calculated from the following:    Height as of this encounter: 182.9 cm (72\").    Weight as of this encounter: 143 kg (316 lb).       Physical Exam  Vitals and nursing note reviewed.   Constitutional:       General: He is awake.      Appearance: Normal appearance. He is well-developed and well-groomed.   HENT:      Head: Normocephalic and atraumatic.      Right Ear: Hearing, tympanic membrane, ear canal and external ear normal.      Left Ear: Hearing, tympanic membrane, ear canal and external ear normal.      Nose: Nose normal.      Mouth/Throat:      Lips: Pink.      Pharynx: Oropharynx is clear.   Eyes:      General: Lids are " normal.      Conjunctiva/sclera: Conjunctivae normal.   Cardiovascular:      Rate and Rhythm: Normal rate and regular rhythm.      Heart sounds: Normal heart sounds.   Pulmonary:      Effort: Pulmonary effort is normal.      Breath sounds: Normal breath sounds and air entry.   Musculoskeletal:      Cervical back: Full passive range of motion without pain.      Right lower leg: No edema.      Left lower leg: No edema.   Lymphadenopathy:      Head:      Right side of head: No submental, submandibular or tonsillar adenopathy.      Left side of head: No submental, submandibular or tonsillar adenopathy.   Skin:     General: Skin is warm and dry.   Neurological:      Mental Status: He is alert.      Sensory: Sensation is intact.      Motor: Motor function is intact.      Coordination: Coordination is intact.      Gait: Gait is intact.   Psychiatric:         Attention and Perception: Attention and perception normal.         Mood and Affect: Mood and affect normal.         Speech: Speech normal.         Behavior: Behavior normal. Behavior is cooperative.         Thought Content: Thought content normal.         Cognition and Memory: Cognition and memory normal.         Judgment: Judgment normal.      Vital Signs  Blood pressure is 128/80 mmHg.    Result Review :  Laboratory Studies  Labs were reviewed with the patient.                     Assessment and Plan     Diagnoses and all orders for this visit:    1. Type 2 diabetes mellitus with hyperglycemia, without long-term current use of insulin (Primary)  -     Tirzepatide-Weight Management (ZEPBOUND) 2.5 MG/0.5ML solution auto-injector; Inject 0.5 mL under the skin into the appropriate area as directed 1 (One) Time Per Week.  Dispense: 2 mL; Refill: 0  -     CBC & Differential  -     Comprehensive metabolic panel  -     Hemoglobin A1c    2. Essential hypertension  -     CBC & Differential  -     Comprehensive metabolic panel    3. Mixed hyperlipidemia  -     Lipid panel         -     eat baked instead of fried for fast foods    4. Encounter for weight management  -     Tirzepatide-Weight Management (ZEPBOUND) 2.5 MG/0.5ML solution auto-injector; Inject 0.5 mL under the skin into the appropriate area as directed 1 (One) Time Per Week.  Dispense: 2 mL; Refill: 0    5. Somers's esophagus without dysplasia  -     pantoprazole (PROTONIX) 40 MG EC tablet; Take 1 tablet by mouth 2 (Two) Times a Day.  Dispense: 180 tablet; Refill: 1    6. Gastroesophageal reflux disease with esophagitis without hemorrhage  -     atorvastatin (LIPITOR) 20 MG tablet; Take 1 tablet by mouth Daily.  Dispense: 90 tablet; Refill: 1    7. Acute pain of right shoulder  -     tiZANidine (ZANAFLEX) 4 MG tablet; Take 1 tablet by mouth Every 8 (Eight) Hours As Needed for Muscle Spasms.  Dispense: 90 tablet; Refill: 1    8. Anxiety  -     DULoxetine (Cymbalta) 20 MG capsule; Take 1 capsule by mouth Daily.  Dispense: 30 capsule; Refill: 0        -     practice relaxation techniques     9. Class 3 severe obesity due to excess calories with serious comorbidity in adult, unspecified BMI  -     Tirzepatide-Weight Management (ZEPBOUND) 2.5 MG/0.5ML solution auto-injector; Inject 0.5 mL under the skin into the appropriate area as directed 1 (One) Time Per Week.  Dispense: 2 mL; Refill: 0    1. Hypertension.  It is controlled.    2. Hyperlipidemia.  His CAD risk is going to be the same as what they are for hypertension. He will continue atorvastatin.    3. Prediabetes.  Due to his weight and his health problems, we decided that we would put him on the metformin. He will continue metformin.    4. GERD.  He has lost 14 pounds; however, he is trying to do that. The risk factors are going to be Somers's, caffeine, hiatal hernia, obesity. Treatment, antacid, PPI with mild improvement.    5. CAD risk.  CAD risk is going to be the same as what they are for hypertension. He will continue lisinopril.    6. Anxiety.  His compliance with  medicine is 100 percent. He will continue Advil PM.         Follow Up     Return in about 6 months (around 9/22/2024).  Patient was given instructions and counseling regarding his condition or for health maintenance advice. Please see specific information pulled into the AVS if appropriate.         Answers submitted by the patient for this visit:  Primary Reason for Visit (Submitted on 3/21/2024)  What is the primary reason for your visit?: High Blood Pressure    Transcribed from ambient dictation for Ronit Mccabe DNP, CHUY by Tammie Knapp.  03/22/24   09:14 CDT    Patient or patient representative verbalized consent to the visit recording.  I have personally performed the services described in this document as transcribed by the above individual, and it is both accurate and complete.     Electronically signed by Ronit Mccabe DNP, CHUY, 03/22/24, 1:35 PM CDT.

## 2024-03-23 LAB
ALBUMIN SERPL-MCNC: 4.5 G/DL (ref 3.5–5.2)
ALBUMIN/GLOB SERPL: 1.7 G/DL
ALP SERPL-CCNC: 81 U/L (ref 39–117)
ALT SERPL-CCNC: 28 U/L (ref 1–41)
AST SERPL-CCNC: 21 U/L (ref 1–40)
BASOPHILS # BLD AUTO: 0.08 10*3/MM3 (ref 0–0.2)
BASOPHILS NFR BLD AUTO: 0.8 % (ref 0–1.5)
BILIRUB SERPL-MCNC: 0.4 MG/DL (ref 0–1.2)
BUN SERPL-MCNC: 13 MG/DL (ref 6–20)
BUN/CREAT SERPL: 13.4 (ref 7–25)
CALCIUM SERPL-MCNC: 9.5 MG/DL (ref 8.6–10.5)
CHLORIDE SERPL-SCNC: 102 MMOL/L (ref 98–107)
CHOLEST SERPL-MCNC: 119 MG/DL (ref 0–200)
CO2 SERPL-SCNC: 28 MMOL/L (ref 22–29)
CREAT SERPL-MCNC: 0.97 MG/DL (ref 0.76–1.27)
EGFRCR SERPLBLD CKD-EPI 2021: 93.3 ML/MIN/1.73
EOSINOPHIL # BLD AUTO: 0.27 10*3/MM3 (ref 0–0.4)
EOSINOPHIL NFR BLD AUTO: 2.9 % (ref 0.3–6.2)
ERYTHROCYTE [DISTWIDTH] IN BLOOD BY AUTOMATED COUNT: 14 % (ref 12.3–15.4)
GLOBULIN SER CALC-MCNC: 2.6 GM/DL
GLUCOSE SERPL-MCNC: 111 MG/DL (ref 65–99)
HBA1C MFR BLD: 5.7 % (ref 4.8–5.6)
HCT VFR BLD AUTO: 44.3 % (ref 37.5–51)
HDLC SERPL-MCNC: 36 MG/DL (ref 40–60)
HGB BLD-MCNC: 14.3 G/DL (ref 13–17.7)
IMM GRANULOCYTES # BLD AUTO: 0.04 10*3/MM3 (ref 0–0.05)
IMM GRANULOCYTES NFR BLD AUTO: 0.4 % (ref 0–0.5)
LDLC SERPL CALC-MCNC: 60 MG/DL (ref 0–100)
LYMPHOCYTES # BLD AUTO: 2.64 10*3/MM3 (ref 0.7–3.1)
LYMPHOCYTES NFR BLD AUTO: 27.9 % (ref 19.6–45.3)
MCH RBC QN AUTO: 29.1 PG (ref 26.6–33)
MCHC RBC AUTO-ENTMCNC: 32.3 G/DL (ref 31.5–35.7)
MCV RBC AUTO: 90 FL (ref 79–97)
MONOCYTES # BLD AUTO: 0.64 10*3/MM3 (ref 0.1–0.9)
MONOCYTES NFR BLD AUTO: 6.8 % (ref 5–12)
NEUTROPHILS # BLD AUTO: 5.8 10*3/MM3 (ref 1.7–7)
NEUTROPHILS NFR BLD AUTO: 61.2 % (ref 42.7–76)
NRBC BLD AUTO-RTO: 0 /100 WBC (ref 0–0.2)
PLATELET # BLD AUTO: 258 10*3/MM3 (ref 140–450)
POTASSIUM SERPL-SCNC: 4.5 MMOL/L (ref 3.5–5.2)
PROT SERPL-MCNC: 7.1 G/DL (ref 6–8.5)
RBC # BLD AUTO: 4.92 10*6/MM3 (ref 4.14–5.8)
SODIUM SERPL-SCNC: 138 MMOL/L (ref 136–145)
TRIGL SERPL-MCNC: 130 MG/DL (ref 0–150)
VLDLC SERPL CALC-MCNC: 23 MG/DL (ref 5–40)
WBC # BLD AUTO: 9.47 10*3/MM3 (ref 3.4–10.8)

## 2024-03-28 ENCOUNTER — OFFICE VISIT (OUTPATIENT)
Dept: FAMILY MEDICINE CLINIC | Facility: CLINIC | Age: 54
End: 2024-03-28
Payer: COMMERCIAL

## 2024-03-28 VITALS
DIASTOLIC BLOOD PRESSURE: 85 MMHG | SYSTOLIC BLOOD PRESSURE: 130 MMHG | WEIGHT: 315 LBS | HEART RATE: 76 BPM | RESPIRATION RATE: 18 BRPM | BODY MASS INDEX: 42.66 KG/M2 | HEIGHT: 72 IN | TEMPERATURE: 98.2 F | OXYGEN SATURATION: 99 %

## 2024-03-28 DIAGNOSIS — J32.9 OTHER SINUSITIS, UNSPECIFIED CHRONICITY: Primary | ICD-10-CM

## 2024-03-28 PROCEDURE — 99213 OFFICE O/P EST LOW 20 MIN: CPT | Performed by: FAMILY MEDICINE

## 2024-03-28 RX ORDER — DEXTROMETHORPHAN HYDROBROMIDE AND PROMETHAZINE HYDROCHLORIDE 15; 6.25 MG/5ML; MG/5ML
5 SYRUP ORAL 4 TIMES DAILY PRN
Qty: 180 ML | Refills: 0 | Status: SHIPPED | OUTPATIENT
Start: 2024-03-28

## 2024-03-28 RX ORDER — METHYLPREDNISOLONE 4 MG/1
TABLET ORAL
Qty: 21 TABLET | Refills: 0 | Status: SHIPPED | OUTPATIENT
Start: 2024-03-28

## 2024-03-28 RX ORDER — AZITHROMYCIN 250 MG/1
TABLET, FILM COATED ORAL
Qty: 6 TABLET | Refills: 0 | Status: SHIPPED | OUTPATIENT
Start: 2024-03-28

## 2024-03-28 NOTE — PROGRESS NOTES
"Chief Complaint  URI (Patient is here for chest congestion.)    Subjective        Danny MICHELLE Vega presents to [facility]  History of Present Illness  Mr. Vega presents today for a sick visit.      He has nasal and sinus congestion.  He has throat drainage.  He has a slight cough.  No fever.  No SOA.      Objective   Vital Signs:  /85 (BP Location: Left arm, Patient Position: Sitting, Cuff Size: Adult)   Pulse 76   Temp 98.2 °F (36.8 °C)   Resp 18   Ht 182.9 cm (72.01\")   Wt (!) 143 kg (316 lb)   SpO2 99%   BMI 42.85 kg/m²   Estimated body mass index is 42.85 kg/m² as calculated from the following:    Height as of this encounter: 182.9 cm (72.01\").    Weight as of this encounter: 143 kg (316 lb).               Physical Exam  Vitals reviewed.   Constitutional:       Appearance: He is well-developed.   HENT:      Head: Normocephalic and atraumatic.      Right Ear: External ear normal.      Left Ear: External ear normal.      Nose: Nose normal. Congestion present.   Eyes:      General: No scleral icterus.        Right eye: No discharge.         Left eye: No discharge.      Conjunctiva/sclera: Conjunctivae normal.      Pupils: Pupils are equal, round, and reactive to light.   Neck:      Thyroid: No thyromegaly.      Trachea: No tracheal deviation.   Cardiovascular:      Rate and Rhythm: Normal rate and regular rhythm.      Heart sounds: Normal heart sounds. No murmur heard.     No friction rub. No gallop.   Pulmonary:      Effort: Pulmonary effort is normal. No respiratory distress.      Breath sounds: Normal breath sounds. No stridor. No wheezing or rales.   Chest:      Chest wall: No tenderness.   Abdominal:      General: Bowel sounds are normal. There is no distension.      Palpations: Abdomen is soft. There is no mass.      Tenderness: There is no abdominal tenderness. There is no guarding or rebound.      Hernia: No hernia is present.   Musculoskeletal:         " General: No deformity. Normal range of motion.      Cervical back: Normal range of motion and neck supple.   Lymphadenopathy:      Cervical: No cervical adenopathy.   Skin:     General: Skin is warm and dry.      Coloration: Skin is not pale.      Findings: No erythema or rash.   Neurological:      Mental Status: He is alert and oriented to person, place, and time.      Cranial Nerves: No cranial nerve deficit.      Motor: No abnormal muscle tone.      Coordination: Coordination normal.      Deep Tendon Reflexes: Reflexes are normal and symmetric. Reflexes normal.   Psychiatric:         Behavior: Behavior normal.         Thought Content: Thought content normal.         Judgment: Judgment normal.            Result Review :             Assessment and Plan     Diagnoses and all orders for this visit:    1. Other sinusitis, unspecified chronicity (Primary)  -     azithromycin (Zithromax Z-Juan Miguel) 250 MG tablet; Take 2 tablets by mouth on day 1, then 1 tablet daily on days 2-5  Dispense: 6 tablet; Refill: 0  -     methylPREDNISolone (MEDROL) 4 MG dose pack; Take as directed on package instructions.  Dispense: 21 tablet; Refill: 0  -     promethazine-dextromethorphan (PROMETHAZINE-DM) 6.25-15 MG/5ML syrup; Take 5 mL by mouth 4 (Four) Times a Day As Needed for Cough.  Dispense: 180 mL; Refill: 0             Follow Up     No follow-ups on file.  Patient was given instructions and counseling regarding his condition or for health maintenance advice. Please see specific information pulled into the AVS if appropriate.

## 2024-04-23 DIAGNOSIS — F41.9 ANXIETY: ICD-10-CM

## 2024-04-23 DIAGNOSIS — Z76.89 ENCOUNTER FOR WEIGHT MANAGEMENT: ICD-10-CM

## 2024-04-23 DIAGNOSIS — E11.65 TYPE 2 DIABETES MELLITUS WITH HYPERGLYCEMIA, WITHOUT LONG-TERM CURRENT USE OF INSULIN: ICD-10-CM

## 2024-04-23 DIAGNOSIS — E66.01 CLASS 3 SEVERE OBESITY DUE TO EXCESS CALORIES WITH SERIOUS COMORBIDITY IN ADULT, UNSPECIFIED BMI: ICD-10-CM

## 2024-04-23 NOTE — TELEPHONE ENCOUNTER
Caller: Danny Vega    Relationship: Self    Best call back number: [phone]     Requested Prescriptions:   Requested Prescriptions     Pending Prescriptions Disp Refills    Tirzepatide-Weight Management (ZEPBOUND) 2.5 MG/0.5ML solution auto-injector 2 mL 0     Sig: Inject 0.5 mL under the skin into the appropriate area as directed 1 (One) Time Per Week.    DULoxetine (Cymbalta) 20 MG capsule 30 capsule 0     Sig: Take 1 capsule by mouth Daily.        Pharmacy where request should be sent: [facility] [address] [phone] [facility] [phone]      Last office visit with prescribing clinician: 3/22/2024   Last telemedicine visit with prescribing clinician: Visit date not found   Next office visit with prescribing clinician: 9/23/2024     Additional details provided by patient: PATIENT IS REQUESTING AN INCREASE TO 30MG FOR DULOXETINE     Does the patient have less than a 3 day supply:  [] Yes  [x] No    Would you like a call back once the refill request has been completed: [x] Yes [] No      Ammon Harris Rep   04/23/24 08:14 CDT

## 2024-04-24 DIAGNOSIS — Z76.89 ENCOUNTER FOR WEIGHT MANAGEMENT: ICD-10-CM

## 2024-04-24 DIAGNOSIS — F41.9 ANXIETY: ICD-10-CM

## 2024-04-24 DIAGNOSIS — E11.65 TYPE 2 DIABETES MELLITUS WITH HYPERGLYCEMIA, WITHOUT LONG-TERM CURRENT USE OF INSULIN: ICD-10-CM

## 2024-04-24 DIAGNOSIS — E66.01 CLASS 3 SEVERE OBESITY DUE TO EXCESS CALORIES WITH SERIOUS COMORBIDITY IN ADULT, UNSPECIFIED BMI: ICD-10-CM

## 2024-04-24 RX ORDER — DULOXETIN HYDROCHLORIDE 20 MG/1
20 CAPSULE, DELAYED RELEASE ORAL DAILY
Qty: 30 CAPSULE | Refills: 0 | OUTPATIENT
Start: 2024-04-24

## 2024-04-24 NOTE — TELEPHONE ENCOUNTER
Patient wants to continue on same dose of zepbound. Patient is asking for an increase in cymbalta. Please review and advise.       Rx Refill Note  Requested Prescriptions     Pending Prescriptions Disp Refills    Tirzepatide-Weight Management (ZEPBOUND) 2.5 MG/0.5ML solution auto-injector 2 mL 0     Sig: Inject 1 mL under the skin into the appropriate area as directed 1 (One) Time Per Week.    DULoxetine (Cymbalta) 20 MG capsule 30 capsule 0     Sig: Take 1 capsule by mouth Daily.      Last office visit with prescribing clinician: 3/22/2024   Last telemedicine visit with prescribing clinician: Visit date not found   Next office visit with prescribing clinician: 4/24/2024                         Would you like a call back once the refill request has been completed: [] Yes [] No    If the office needs to give you a call back, can they leave a voicemail: [] Yes [] No    Genia Heart LPN  04/24/24, 15:03 CDT

## 2024-04-24 NOTE — TELEPHONE ENCOUNTER
Duplicate encounter.     Rx Refill Note  Requested Prescriptions     Pending Prescriptions Disp Refills    Tirzepatide-Weight Management (ZEPBOUND) 2.5 MG/0.5ML solution auto-injector 2 mL 0     Sig: Inject 0.5 mL under the skin into the appropriate area as directed 1 (One) Time Per Week.    DULoxetine (Cymbalta) 20 MG capsule 30 capsule 0     Sig: Take 1 capsule by mouth Daily.      Last office visit with prescribing clinician: 3/22/2024   Last telemedicine visit with prescribing clinician: Visit date not found   Next office visit with prescribing clinician: 9/23/2024                         Would you like a call back once the refill request has been completed: [] Yes [] No    If the office needs to give you a call back, can they leave a voicemail: [] Yes [] No    Genia Heart LPN  04/24/24, 15:16 CDT

## 2024-04-26 DIAGNOSIS — Z76.89 ENCOUNTER FOR WEIGHT MANAGEMENT: ICD-10-CM

## 2024-04-26 DIAGNOSIS — F41.9 ANXIETY: ICD-10-CM

## 2024-04-26 DIAGNOSIS — E66.01 CLASS 3 SEVERE OBESITY DUE TO EXCESS CALORIES WITH SERIOUS COMORBIDITY IN ADULT, UNSPECIFIED BMI: ICD-10-CM

## 2024-04-26 DIAGNOSIS — E11.65 TYPE 2 DIABETES MELLITUS WITH HYPERGLYCEMIA, WITHOUT LONG-TERM CURRENT USE OF INSULIN: ICD-10-CM

## 2024-04-26 RX ORDER — DULOXETIN HYDROCHLORIDE 20 MG/1
20 CAPSULE, DELAYED RELEASE ORAL DAILY
Qty: 30 CAPSULE | Refills: 0 | Status: SHIPPED | OUTPATIENT
Start: 2024-04-26

## 2024-04-26 NOTE — TELEPHONE ENCOUNTER
Patient wanting to increase dose to 30 mg as discussed.     Please advise.       Rx Refill Note  Requested Prescriptions     Pending Prescriptions Disp Refills    DULoxetine (Cymbalta) 20 MG capsule 30 capsule 0     Sig: Take 1 capsule by mouth Daily.     Refused Prescriptions Disp Refills    Tirzepatide-Weight Management (ZEPBOUND) 2.5 MG/0.5ML solution auto-injector 2 mL 0     Sig: Inject 0.5 mL under the skin into the appropriate area as directed 1 (One) Time Per Week.     Refused By: DENNIS SOLANO     Reason for Refusal: Duplicate renewal request      Last office visit with prescribing clinician: 3/22/2024   Next office visit with prescribing clinician: 9/23/2024       Dennis Solano CMA  04/26/24, 13:42 CDT

## 2024-04-29 RX ORDER — DULOXETIN HYDROCHLORIDE 20 MG/1
20 CAPSULE, DELAYED RELEASE ORAL DAILY
Qty: 90 CAPSULE | Refills: 0 | Status: SHIPPED | OUTPATIENT
Start: 2024-04-29

## 2024-04-30 ENCOUNTER — TELEPHONE (OUTPATIENT)
Dept: FAMILY MEDICINE CLINIC | Facility: CLINIC | Age: 54
End: 2024-04-30
Payer: COMMERCIAL

## 2024-04-30 DIAGNOSIS — E66.01 CLASS 3 SEVERE OBESITY DUE TO EXCESS CALORIES WITH SERIOUS COMORBIDITY IN ADULT, UNSPECIFIED BMI: Primary | ICD-10-CM

## 2024-04-30 DIAGNOSIS — F41.9 ANXIETY: ICD-10-CM

## 2024-04-30 NOTE — TELEPHONE ENCOUNTER
Caller: Danny Vega    Relationship: Self    Best call back number:     [phone] (Mobile)       Who are you requesting to speak with (clinical staff, provider,  specific staff member): CLINICAL    What was the call regarding:  THE PATIENT STATES THAT [facility] DENIED THE ZEPBOUND AND THAT IT NEEDS A PRE AUTHORIZATION.      [facility] PRIOR AUTHORIZATION LINE: [phone]

## 2024-04-30 NOTE — TELEPHONE ENCOUNTER
Caller: Danny Vega    Relationship: Self    Best call back number: [phone]     Requested Prescriptions:   Requested Prescriptions     Pending Prescriptions Disp Refills    DULoxetine (Cymbalta) 20 MG capsule 30 capsule 0     Sig: Take 1 capsule by mouth Daily.    DULoxetine (Cymbalta) 20 MG capsule 90 capsule 0     Sig: Take 1 capsule by mouth Daily.        Pharmacy where request should be sent: [facility] - [facility] [place] - [address] [place] [address] [phone] [facility] [phone] FX     Last office visit with prescribing clinician: 3/22/2024   Last telemedicine visit with prescribing clinician: Visit date not found   Next office visit with prescribing clinician: 9/23/2024     Additional details provided by patient: PATIENT HAS BEEN WAITING FOR THIS MEDICATION TO COME FROM [facility], BUT THEY WONT SEND IT OUT UNTIL 04.06.24, PATIENT IS REQUESTING A 30 DAY SUPPLY BE SENT TO THE PHARMACY LISTED ABOVE.     Does the patient have less than a 3 day supply:  [x] Yes  [] No    Would you like a call back once the refill request has been completed: [x] Yes [] No      Ammon Harris Rep   04/30/24 08:59 CDT

## 2024-05-01 DIAGNOSIS — F41.9 ANXIETY: ICD-10-CM

## 2024-05-01 DIAGNOSIS — E11.65 TYPE 2 DIABETES MELLITUS WITH HYPERGLYCEMIA, WITHOUT LONG-TERM CURRENT USE OF INSULIN: ICD-10-CM

## 2024-05-01 DIAGNOSIS — E66.01 CLASS 3 SEVERE OBESITY DUE TO EXCESS CALORIES WITH SERIOUS COMORBIDITY IN ADULT, UNSPECIFIED BMI: ICD-10-CM

## 2024-05-01 DIAGNOSIS — Z76.89 ENCOUNTER FOR WEIGHT MANAGEMENT: ICD-10-CM

## 2024-05-01 RX ORDER — DULOXETIN HYDROCHLORIDE 20 MG/1
20 CAPSULE, DELAYED RELEASE ORAL DAILY
Qty: 90 CAPSULE | Refills: 0 | OUTPATIENT
Start: 2024-05-01

## 2024-05-01 RX ORDER — DULOXETIN HYDROCHLORIDE 20 MG/1
20 CAPSULE, DELAYED RELEASE ORAL DAILY
Qty: 30 CAPSULE | Refills: 0 | OUTPATIENT
Start: 2024-05-01

## 2024-05-01 NOTE — TELEPHONE ENCOUNTER
PA approved on file- called ins to follow up, reports qty limit issues. Pt's plan only covers 2ml of the 2.5mg/.5ml per 365 days. Pt has met limit, and qty limit can not be overturned since pt has not missed more than 2 consecutive doses of med.   Please advise

## 2024-05-03 ENCOUNTER — PATIENT MESSAGE (OUTPATIENT)
Dept: FAMILY MEDICINE CLINIC | Facility: CLINIC | Age: 54
End: 2024-05-03
Payer: COMMERCIAL

## 2024-05-03 RX ORDER — DULOXETIN HYDROCHLORIDE 20 MG/1
20 CAPSULE, DELAYED RELEASE ORAL DAILY
Qty: 90 CAPSULE | Refills: 0 | OUTPATIENT
Start: 2024-05-03

## 2024-05-03 NOTE — TELEPHONE ENCOUNTER
5mg/.5ml pended- pt should have went up in dose on 4/26/24 refill encounter but 2.5mg was refilled instead.

## 2024-05-03 NOTE — TELEPHONE ENCOUNTER
From: Danny Vega  To: Ronit Mccabe  Sent: 5/3/2024 9:22 AM CDT  Subject: New prescription needed     Ronit could you write a new prescription for zepbound and increase the dosage from the 2.5 to the 5.0. [facility] insurance and everything will be alright and I can get meds. Thank you

## 2024-05-03 NOTE — TELEPHONE ENCOUNTER
Refill are duplicates     Rx Refill Note  Requested Prescriptions     Pending Prescriptions Disp Refills    Tirzepatide-Weight Management (ZEPBOUND) 2.5 MG/0.5ML solution auto-injector 2 mL 0     Sig: Inject 0.5 mL under the skin into the appropriate area as directed 1 (One) Time Per Week.    DULoxetine (Cymbalta) 20 MG capsule 90 capsule 0     Sig: Take 1 capsule by mouth Daily.      Last office visit with prescribing clinician: 3/22/2024   Last telemedicine visit with prescribing clinician: Visit date not found   Next office visit with prescribing clinician: 9/23/2024                         Would you like a call back once the refill request has been completed: [] Yes [] No    If the office needs to give you a call back, can they leave a voicemail: [] Yes [] No    Kena Latham MA  05/03/24, 12:23 CDT

## 2024-05-30 ENCOUNTER — PATIENT MESSAGE (OUTPATIENT)
Dept: FAMILY MEDICINE CLINIC | Facility: CLINIC | Age: 54
End: 2024-05-30
Payer: COMMERCIAL

## 2024-05-30 DIAGNOSIS — E11.65 TYPE 2 DIABETES MELLITUS WITH HYPERGLYCEMIA, WITHOUT LONG-TERM CURRENT USE OF INSULIN: Primary | ICD-10-CM

## 2024-06-21 DIAGNOSIS — E11.65 TYPE 2 DIABETES MELLITUS WITH HYPERGLYCEMIA, WITHOUT LONG-TERM CURRENT USE OF INSULIN: ICD-10-CM

## 2024-06-25 ENCOUNTER — PATIENT MESSAGE (OUTPATIENT)
Dept: FAMILY MEDICINE CLINIC | Facility: CLINIC | Age: 54
End: 2024-06-25
Payer: COMMERCIAL

## 2024-06-25 NOTE — TELEPHONE ENCOUNTER
From: Danny Greenfield  To: Ronitvivi Mccabe  Sent: 6/25/2024 10:12 AM CDT  Subject: Milla Greenfield my 13 year old daughter.    Ronit is there anyway you could get my daughter a referral to the weight loss doctors at [facility]. She is struggling to lose weight and she wants to get on the zepbound shots that work and kids are able to take them. I know you are not alliwed to prescribe them so if you could get me a referral to the group you told me that could. Thank you so much. Danny greenfield

## 2024-07-02 DIAGNOSIS — R73.03 PRE-DIABETES: ICD-10-CM

## 2024-07-02 RX ORDER — METFORMIN HYDROCHLORIDE 500 MG/1
500 TABLET, EXTENDED RELEASE ORAL
Qty: 90 TABLET | Refills: 1 | Status: SHIPPED | OUTPATIENT
Start: 2024-07-02

## 2024-07-09 DIAGNOSIS — F41.9 ANXIETY: ICD-10-CM

## 2024-07-09 DIAGNOSIS — M25.511 ACUTE PAIN OF RIGHT SHOULDER: ICD-10-CM

## 2024-07-09 DIAGNOSIS — E11.65 TYPE 2 DIABETES MELLITUS WITH HYPERGLYCEMIA, WITHOUT LONG-TERM CURRENT USE OF INSULIN: ICD-10-CM

## 2024-07-09 NOTE — TELEPHONE ENCOUNTER
Rx Refill Note  Requested Prescriptions     Pending Prescriptions Disp Refills    Zepbound 5 MG/0.5ML solution auto-injector [Pharmacy Med Name: ZEPBOUND PEN 0.5ML 4'S 5MG] 2 mL 12     Sig: INJECT 0.5 ML UNDER THE SKIN INTO THE APPROPRIATE AREA AS DIRECTED ONCE A WEEK    tiZANidine (ZANAFLEX) 4 MG tablet [Pharmacy Med Name: TIZANIDINE HCL TABS 4MG] 90 tablet 11     Sig: TAKE 1 TABLET EVERY 8 HOURS AS NEEDED FOR MUSCLE SPASMS      Last office visit with prescribing clinician: 3/22/2024   Next office visit with prescribing clinician: 7/9/2024         Jennie Solano CMA  07/09/24, 16:11 CDT

## 2024-07-10 RX ORDER — TIZANIDINE 4 MG/1
4 TABLET ORAL EVERY 8 HOURS PRN
Qty: 90 TABLET | Refills: 11 | Status: SHIPPED | OUTPATIENT
Start: 2024-07-10

## 2024-07-10 RX ORDER — DULOXETIN HYDROCHLORIDE 20 MG/1
20 CAPSULE, DELAYED RELEASE ORAL DAILY
Qty: 90 CAPSULE | Refills: 0 | Status: SHIPPED | OUTPATIENT
Start: 2024-07-10

## 2024-07-10 RX ORDER — TIRZEPATIDE 5 MG/.5ML
INJECTION, SOLUTION SUBCUTANEOUS
Qty: 2 ML | Refills: 12 | Status: SHIPPED | OUTPATIENT
Start: 2024-07-10

## 2024-09-20 DIAGNOSIS — K21.00 GASTROESOPHAGEAL REFLUX DISEASE WITH ESOPHAGITIS WITHOUT HEMORRHAGE: ICD-10-CM

## 2024-09-20 DIAGNOSIS — K22.70 BARRETT'S ESOPHAGUS WITHOUT DYSPLASIA: ICD-10-CM

## 2024-09-20 RX ORDER — PANTOPRAZOLE SODIUM 40 MG/1
40 TABLET, DELAYED RELEASE ORAL 2 TIMES DAILY
Qty: 180 TABLET | Refills: 3 | Status: SHIPPED | OUTPATIENT
Start: 2024-09-20

## 2024-09-20 RX ORDER — ATORVASTATIN CALCIUM 20 MG/1
20 TABLET, FILM COATED ORAL DAILY
Qty: 90 TABLET | Refills: 3 | Status: SHIPPED | OUTPATIENT
Start: 2024-09-20

## 2024-09-23 ENCOUNTER — OFFICE VISIT (OUTPATIENT)
Dept: FAMILY MEDICINE CLINIC | Facility: CLINIC | Age: 54
End: 2024-09-23
Payer: COMMERCIAL

## 2024-09-23 VITALS
BODY MASS INDEX: 37.79 KG/M2 | WEIGHT: 279 LBS | TEMPERATURE: 98.6 F | HEART RATE: 69 BPM | RESPIRATION RATE: 18 BRPM | OXYGEN SATURATION: 99 % | SYSTOLIC BLOOD PRESSURE: 123 MMHG | DIASTOLIC BLOOD PRESSURE: 80 MMHG | HEIGHT: 72 IN

## 2024-09-23 DIAGNOSIS — E11.65 TYPE 2 DIABETES MELLITUS WITH HYPERGLYCEMIA, WITHOUT LONG-TERM CURRENT USE OF INSULIN: Primary | ICD-10-CM

## 2024-09-23 DIAGNOSIS — F51.01 PRIMARY INSOMNIA: ICD-10-CM

## 2024-09-23 DIAGNOSIS — F41.8 DEPRESSION WITH ANXIETY: ICD-10-CM

## 2024-09-23 DIAGNOSIS — E66.01 MORBID (SEVERE) OBESITY DUE TO EXCESS CALORIES: ICD-10-CM

## 2024-09-23 DIAGNOSIS — G47.31 PRIMARY CENTRAL SLEEP APNEA: ICD-10-CM

## 2024-09-23 DIAGNOSIS — I10 ESSENTIAL HYPERTENSION: ICD-10-CM

## 2024-09-23 DIAGNOSIS — R68.82 LOW LIBIDO: ICD-10-CM

## 2024-09-23 DIAGNOSIS — G47.33 OSA ON CPAP: ICD-10-CM

## 2024-09-23 DIAGNOSIS — E78.2 MIXED HYPERLIPIDEMIA: ICD-10-CM

## 2024-09-23 PROCEDURE — 99214 OFFICE O/P EST MOD 30 MIN: CPT | Performed by: NURSE PRACTITIONER

## 2024-09-23 RX ORDER — TRAZODONE HYDROCHLORIDE 50 MG/1
50 TABLET, FILM COATED ORAL NIGHTLY
Qty: 90 TABLET | Refills: 0 | Status: SHIPPED | OUTPATIENT
Start: 2024-09-23

## 2024-09-23 RX ORDER — DULOXETIN HYDROCHLORIDE 30 MG/1
30 CAPSULE, DELAYED RELEASE ORAL DAILY
Qty: 90 CAPSULE | Refills: 1 | Status: SHIPPED | OUTPATIENT
Start: 2024-09-23

## 2024-09-23 RX ORDER — LISINOPRIL 20 MG/1
20 TABLET ORAL DAILY
Qty: 90 TABLET | Refills: 2 | Status: SHIPPED | OUTPATIENT
Start: 2024-09-23

## 2024-09-24 LAB
ALBUMIN SERPL-MCNC: 4.3 G/DL (ref 3.5–5.2)
ALBUMIN/CREAT UR: 3 MG/G CREAT (ref 0–29)
ALBUMIN/GLOB SERPL: 2.2 G/DL
ALP SERPL-CCNC: 76 U/L (ref 39–117)
ALT SERPL-CCNC: 17 U/L (ref 1–41)
AST SERPL-CCNC: 19 U/L (ref 1–40)
BILIRUB SERPL-MCNC: 0.4 MG/DL (ref 0–1.2)
BUN SERPL-MCNC: 9 MG/DL (ref 6–20)
BUN/CREAT SERPL: 10.8 (ref 7–25)
CALCIUM SERPL-MCNC: 9.6 MG/DL (ref 8.6–10.5)
CHLORIDE SERPL-SCNC: 103 MMOL/L (ref 98–107)
CHOLEST SERPL-MCNC: 110 MG/DL (ref 0–200)
CO2 SERPL-SCNC: 24.6 MMOL/L (ref 22–29)
CREAT SERPL-MCNC: 0.83 MG/DL (ref 0.76–1.27)
CREAT UR-MCNC: 119.9 MG/DL
EGFRCR SERPLBLD CKD-EPI 2021: 104 ML/MIN/1.73
ERYTHROCYTE [DISTWIDTH] IN BLOOD BY AUTOMATED COUNT: 13.9 % (ref 12.3–15.4)
GLOBULIN SER CALC-MCNC: 2 GM/DL
GLUCOSE SERPL-MCNC: 96 MG/DL (ref 65–99)
HBA1C MFR BLD: 5.2 % (ref 4.8–5.6)
HCT VFR BLD AUTO: 44.5 % (ref 37.5–51)
HDLC SERPL-MCNC: 36 MG/DL (ref 40–60)
HGB BLD-MCNC: 14.6 G/DL (ref 13–17.7)
LDLC SERPL CALC-MCNC: 55 MG/DL (ref 0–100)
MCH RBC QN AUTO: 30.1 PG (ref 26.6–33)
MCHC RBC AUTO-ENTMCNC: 32.8 G/DL (ref 31.5–35.7)
MCV RBC AUTO: 91.8 FL (ref 79–97)
MICROALBUMIN UR-MCNC: 3.8 UG/ML
PLATELET # BLD AUTO: 280 10*3/MM3 (ref 140–450)
POTASSIUM SERPL-SCNC: 4.4 MMOL/L (ref 3.5–5.2)
PROT SERPL-MCNC: 6.3 G/DL (ref 6–8.5)
RBC # BLD AUTO: 4.85 10*6/MM3 (ref 4.14–5.8)
SODIUM SERPL-SCNC: 139 MMOL/L (ref 136–145)
TESTOST SERPL-MCNC: 620 NG/DL (ref 264–916)
TRIGL SERPL-MCNC: 101 MG/DL (ref 0–150)
VLDLC SERPL CALC-MCNC: 19 MG/DL (ref 5–40)
WBC # BLD AUTO: 9.71 10*3/MM3 (ref 3.4–10.8)

## 2024-10-11 ENCOUNTER — OFFICE VISIT (OUTPATIENT)
Dept: FAMILY MEDICINE CLINIC | Facility: CLINIC | Age: 54
End: 2024-10-11
Payer: COMMERCIAL

## 2024-10-11 VITALS
DIASTOLIC BLOOD PRESSURE: 73 MMHG | OXYGEN SATURATION: 99 % | BODY MASS INDEX: 37.79 KG/M2 | HEART RATE: 80 BPM | SYSTOLIC BLOOD PRESSURE: 114 MMHG | WEIGHT: 279 LBS | RESPIRATION RATE: 18 BRPM | HEIGHT: 72 IN | TEMPERATURE: 98.6 F

## 2024-10-11 DIAGNOSIS — Z79.899 MEDICATION MANAGEMENT: ICD-10-CM

## 2024-10-11 DIAGNOSIS — J01.01 ACUTE RECURRENT MAXILLARY SINUSITIS: Primary | ICD-10-CM

## 2024-10-11 DIAGNOSIS — B00.1 FEVER BLISTER: ICD-10-CM

## 2024-10-11 DIAGNOSIS — E66.01 MORBID (SEVERE) OBESITY DUE TO EXCESS CALORIES: ICD-10-CM

## 2024-10-11 DIAGNOSIS — J40 BRONCHITIS: ICD-10-CM

## 2024-10-11 PROBLEM — K20.90 ESOPHAGITIS: Status: ACTIVE | Noted: 2023-12-20

## 2024-10-11 PROBLEM — K44.9 DIAPHRAGMATIC HERNIA WITHOUT OBSTRUCTION OR GANGRENE: Status: ACTIVE | Noted: 2023-03-08

## 2024-10-11 PROBLEM — K22.70 BARRETT'S ESOPHAGUS WITHOUT DYSPLASIA: Status: ACTIVE | Noted: 2023-03-03

## 2024-10-11 PROCEDURE — 96372 THER/PROPH/DIAG INJ SC/IM: CPT | Performed by: NURSE PRACTITIONER

## 2024-10-11 PROCEDURE — 99214 OFFICE O/P EST MOD 30 MIN: CPT | Performed by: NURSE PRACTITIONER

## 2024-10-11 RX ORDER — DEXTROMETHORPHAN HYDROBROMIDE AND PROMETHAZINE HYDROCHLORIDE 15; 6.25 MG/5ML; MG/5ML
5 SYRUP ORAL 4 TIMES DAILY PRN
Qty: 180 ML | Refills: 0 | Status: SHIPPED | OUTPATIENT
Start: 2024-10-11

## 2024-10-11 RX ORDER — AZITHROMYCIN 250 MG/1
TABLET, FILM COATED ORAL
Qty: 6 TABLET | Refills: 0 | Status: SHIPPED | OUTPATIENT
Start: 2024-10-11

## 2024-10-11 RX ORDER — DEXAMETHASONE SODIUM PHOSPHATE 10 MG/ML
10 INJECTION INTRAMUSCULAR; INTRAVENOUS ONCE
Status: COMPLETED | OUTPATIENT
Start: 2024-10-11 | End: 2024-10-11

## 2024-10-11 RX ORDER — VALACYCLOVIR HYDROCHLORIDE 1 G/1
1000 TABLET, FILM COATED ORAL 2 TIMES DAILY
Qty: 30 TABLET | Refills: 1 | Status: SHIPPED | OUTPATIENT
Start: 2024-10-11

## 2024-10-11 RX ADMIN — DEXAMETHASONE SODIUM PHOSPHATE 10 MG: 10 INJECTION INTRAMUSCULAR; INTRAVENOUS at 08:40

## 2024-10-11 NOTE — PROGRESS NOTES
"Chief Complaint  STEPHANIE Vega presents to [facility]    History of Present Illness  The patient is a 54-year-old male who presents for evaluation of respiratory infection.    He began experiencing symptoms of a respiratory infection in 1999 just got back from [place]. He has not had any fever or chills. His symptoms include cough, congestion, hoarseness, and pressure behind his eyes. He has also been sneezing and producing green nasal discharge. He has been managing his symptoms with over-the-counter medications such as Tylenol Cold and Flu and Mucinex.    Aspirin use is indicated based on review of current medical conditions. Pros/cons of this therapy discussed with pt.  Benefits to medication outweigh potential harm.     The following portions of the patient's history were reviewed and updated as appropriate: allergies, current medications, past family history, past medical history, past social history, past surgical history and problem list.    Objective   Vital Signs:  /73 (BP Location: Left arm, Patient Position: Sitting, Cuff Size: Large Adult)   Pulse 80   Temp 98.6 °F (37 °C) (Infrared)   Resp 18   Ht 182.9 cm (72.01\") Comment: per patient  Wt 127 kg (279 lb)   SpO2 99%   BMI 37.83 kg/m²   Estimated body mass index is 37.83 kg/m² as calculated from the following:    Height as of this encounter: 182.9 cm (72.01\").    Weight as of this encounter: 127 kg (279 lb).     Class 2 Severe Obesity (BMI >=35 and <=39.9). Obesity-related health conditions include the following: hypertension, diabetes mellitus, dyslipidemias, and GERD. Obesity is unchanged. BMI is is above average; BMI management plan is completed. We discussed portion control and increasing exercise.    Physical Exam  Vitals and nursing note reviewed.   Constitutional:       General: He is awake.      Appearance: Normal appearance. He is well-developed and well-groomed. "   HENT:      Head: Normocephalic and atraumatic.      Right Ear: Hearing, tympanic membrane, ear canal and external ear normal.      Left Ear: Hearing, tympanic membrane, ear canal and external ear normal.      Nose:      Right Sinus: Maxillary sinus tenderness present.      Left Sinus: Maxillary sinus tenderness present.      Mouth/Throat:      Lips: Pink.      Pharynx: Posterior oropharyngeal erythema present.   Eyes:      General: Lids are normal.      Conjunctiva/sclera: Conjunctivae normal.   Cardiovascular:      Rate and Rhythm: Normal rate and regular rhythm.      Heart sounds: Normal heart sounds.   Pulmonary:      Effort: Pulmonary effort is normal.      Breath sounds: Normal breath sounds and air entry.   Musculoskeletal:      Cervical back: Full passive range of motion without pain.      Right lower leg: No edema.      Left lower leg: No edema.   Lymphadenopathy:      Head:      Right side of head: No submental, submandibular or tonsillar adenopathy.      Left side of head: No submental, submandibular or tonsillar adenopathy.   Skin:     General: Skin is warm and dry.   Neurological:      Mental Status: He is alert.      Sensory: Sensation is intact.      Motor: Motor function is intact.      Coordination: Coordination is intact.      Gait: Gait is intact.   Psychiatric:         Attention and Perception: Attention and perception normal.         Mood and Affect: Mood and affect normal.         Speech: Speech normal.         Behavior: Behavior normal. Behavior is cooperative.         Thought Content: Thought content normal.         Cognition and Memory: Cognition and memory normal.         Judgment: Judgment normal.      Physical Exam      Result Review :          Results             Assessment and Plan     Diagnoses and all orders for this visit:    1. Acute recurrent maxillary sinusitis (Primary)  -     dexAMETHasone (DECADRON) injection 10 mg  -     azithromycin (Zithromax Z-Juan Miguel) 250 MG tablet; Take 2  tablets by mouth on day 1, then 1 tablet daily on days 2-5  Dispense: 6 tablet; Refill: 0    2. Fever blister  -     valACYclovir (Valtrex) 1000 MG tablet; Take 1 tablet by mouth 2 (Two) Times a Day.  Dispense: 30 tablet; Refill: 1    3. Bronchitis  -     promethazine-dextromethorphan (PROMETHAZINE-DM) 6.25-15 MG/5ML syrup; Take 5 mL by mouth 4 (Four) Times a Day As Needed for Cough.  Dispense: 180 mL; Refill: 0    4. Morbid (severe) obesity due to excess calories       - continue zepbound as prescribed       - Obesity Plan:    The patient BMI is outside this range and we recommended/discussed today to utilize a diet/exercise program to get back into the appropriate range.  Federal guidelines recommend that people under the age of 65 should have a BMI of 18.5-24.9  The initial step is to document everything that is consumed into a food diary. Studies have shown that patients can lose up to 2x the weight by keeping track of foods.   Choose one bad food weekly and eliminate it from your diet.  Replace with one healthy food  Goal over next 2-4 weeks is walk 30 minutes per day 5 days per week at pace difficult to hold conversation.   Drink more water, less soda.   Cut back on portion sizes.   Today we encouraged roughly a 1 lb per week weight loss with initial goal of 5% weight loss.  Discussed if eating out for a meal, consider cutting food in half and placing into a to go container.  Individually portion any foods coming into the home based on package.  Use smaller plates  Drinking 12 oz of water 30 minutes before meal as way to suppress appetite.  Medications discussed today include metformin, topamax, phentermine, Qsymia, Belviq (lorcaserin), Contrave (Buproprion/naltrexone).    Lifestyle therapy   Simple advice to lose weight   Internet programs or self help books.    Advice from dietitian  Set Goals that are realistic   Encouraged to use visual aides to help in measuring foods  Baseball-1 cup good for green salad,  frozen yogurt, medium piece of fruit, baked potato  Handful - ½ cup good cut fruit, cooked vegetables, pasta, rice  Egg- ¼ cup good for dried fruit  Deck of cards-3 ounces good for meat and poultry  Check book-3 ounces good for grilled fish  6 dice side by side- 1 ½ ounces good for natural cheese  Simple tips   Plate method-reduce plate size to 9 inch dinner plate.  Half of plate should be filled with non-starchy vegetables (broccoli, lettuce, cauliflower, tomatoes), ¼ plate with lean source of protein (lean chicken, turkey, fish), remaining ½ with whole grains (brown rice, potato, whole grain breads  Avoid liquid calories (regular soda, juice, coffee with cream)  Focus  on water, seltzer water and other non-calorie drinks  Replace regular sugar with non-caloric sweeteners  Avoid skipping meals: plan small regular meals throughout the day in order to keep your hunger controlled  Consider using meal replacements if unable to plan a healthy meal (protein shake, high protein bar)  Replace all white bread with whole wheat/whole grain alternative  Swap regular salad dressings (mayonnaise, butter, or low fat or fat free alternative)  Avoid high fat, high calorie, high carbohydrate snakes (cookies, pastries, cakes)  Snack on fruits, low fat dairy (yogurt, cottage cheese)     5. Medication management      Assessment & Plan  1. Acute recurrent maxillary sinusitis.  Dexamethasone will be administered in the office today. A Z-Juan Miguel will be provided with instructions for use. He is advised to increase fluid intake and use Tylenol and ibuprofen as needed for fever or pain.    2. Bronchitis.  Promethazine DM cough syrup will be prescribed, with instructions to take 5 mL up to four times a day as needed.    3. Fever blisters.  Valtrex 1000 mg tablet will be prescribed, with instructions to take one tablet orally twice a day for about 3 days or until the fever blisters resolve. Extra medication will be provided due to his frequent  fever blisters, and he can stop and start it as needed. He is advised to increase fluid intake and practice good hand hygiene.    4. Hyperlipidemia.  He will continue taking atorvastatin 20 mg at bedtime. Dietary advice includes consuming baked instead of fried foods and more green leafy vegetables.    5. Depression.  He will continue taking duloxetine 30 mg capsule daily.    6. Hypertension.  He will continue taking lisinopril 20 mg daily.    7. Prediabetes.  He will continue taking metformin and Mounjaro as prescribed.    8. GERD.  He will continue taking pantoprazole 40 mg daily.    9. Erectile dysfunction.  He will continue taking sildenafil 100 mg as needed.    10. Insomnia.  He will continue taking trazodone 50 mg as prescribed.    Follow-up  Return in a week if there is no improvement, or sooner if needed.      ICD-10-CM ICD-9-CM   1. Acute recurrent maxillary sinusitis  J01.01 461.0   2. Fever blister  B00.1 054.9   3. Bronchitis  J40 490   4. Medication management  Z79.899 V58.69   5. Morbid (severe) obesity due to excess calories  E66.01 278.01                Follow Up     Return in about 1 week (around 10/18/2024), or if symptoms worsen or fail to improve.  Patient was given instructions and counseling regarding his condition or for health maintenance advice. Please see specific information pulled into the AVS if appropriate.       Patient or patient representative verbalized consent for the use of Ambient Listening during the visit with  Ronit Mccabe DNP, APRN for chart documentation. 10/11/2024  08:20 CDT    Electronically signed by Ronit Mccabe DNP, CHUY, 10/11/24, 8:40 AM CDT.    Answers submitted by the patient for this visit:  Other (Submitted on 10/10/2024)  Please describe your symptoms.: Upper respiratory infection  Have you had these symptoms before?: Yes  How long have you been having these symptoms?: 1-4 days  Please list any medications you are currently taking for this  condition.: None  Please describe any probable cause for these symptoms. : My child  Primary Reason for Visit (Submitted on 10/10/2024)  What is the primary reason for your visit?: Problem Not Listed

## 2024-12-23 ENCOUNTER — OFFICE VISIT (OUTPATIENT)
Dept: FAMILY MEDICINE CLINIC | Facility: CLINIC | Age: 54
End: 2024-12-23
Payer: COMMERCIAL

## 2024-12-23 ENCOUNTER — TELEPHONE (OUTPATIENT)
Dept: FAMILY MEDICINE CLINIC | Facility: CLINIC | Age: 54
End: 2024-12-23

## 2024-12-23 VITALS
RESPIRATION RATE: 20 BRPM | HEIGHT: 72 IN | DIASTOLIC BLOOD PRESSURE: 76 MMHG | HEART RATE: 75 BPM | BODY MASS INDEX: 38.25 KG/M2 | SYSTOLIC BLOOD PRESSURE: 117 MMHG | OXYGEN SATURATION: 98 % | TEMPERATURE: 98 F | WEIGHT: 282.4 LBS

## 2024-12-23 DIAGNOSIS — F32.1 EPISODE OF MODERATE MAJOR DEPRESSION: ICD-10-CM

## 2024-12-23 DIAGNOSIS — F51.01 PRIMARY INSOMNIA: ICD-10-CM

## 2024-12-23 DIAGNOSIS — I10 PRIMARY HYPERTENSION: ICD-10-CM

## 2024-12-23 DIAGNOSIS — Z76.89 ENCOUNTER FOR WEIGHT MANAGEMENT: ICD-10-CM

## 2024-12-23 DIAGNOSIS — E78.2 MIXED HYPERLIPIDEMIA: ICD-10-CM

## 2024-12-23 DIAGNOSIS — E11.65 TYPE 2 DIABETES MELLITUS WITH HYPERGLYCEMIA, WITHOUT LONG-TERM CURRENT USE OF INSULIN: Primary | ICD-10-CM

## 2024-12-23 DIAGNOSIS — R73.03 PRE-DIABETES: ICD-10-CM

## 2024-12-23 PROCEDURE — 99214 OFFICE O/P EST MOD 30 MIN: CPT | Performed by: NURSE PRACTITIONER

## 2024-12-23 RX ORDER — METFORMIN HYDROCHLORIDE 500 MG/1
500 TABLET, EXTENDED RELEASE ORAL
Qty: 90 TABLET | Refills: 1 | Status: SHIPPED | OUTPATIENT
Start: 2024-12-23

## 2024-12-23 RX ORDER — TRAZODONE HYDROCHLORIDE 100 MG/1
100 TABLET ORAL NIGHTLY
Qty: 90 TABLET | Refills: 1 | Status: SHIPPED | OUTPATIENT
Start: 2024-12-23

## 2024-12-23 RX ORDER — BUPROPION HYDROCHLORIDE 150 MG/1
150 TABLET, EXTENDED RELEASE ORAL 2 TIMES DAILY
Qty: 180 TABLET | Refills: 1 | Status: SHIPPED | OUTPATIENT
Start: 2024-12-23

## 2024-12-23 RX ORDER — TRAZODONE HYDROCHLORIDE 100 MG/1
100 TABLET ORAL NIGHTLY
Qty: 90 TABLET | Refills: 1 | Status: SHIPPED | OUTPATIENT
Start: 2024-12-23 | End: 2024-12-23 | Stop reason: SDUPTHER

## 2024-12-23 NOTE — PROGRESS NOTES
Chief Complaint  Diabetes (Follow up )    Subjective        Danny Vega presents to [facility]  Anxiety  Presents for follow-up visit.  Symptoms include decreased concentration, depressed mood, excessive worry and insomnia.  Patient reports no chest pain, dizziness, nervous/anxious behavior, restlessness or suicidal ideas. Symptoms occur constantly. The severity of symptoms is moderate. The patient sleeps 5 hours per night. The quality of sleep is fair. Awakens often during the night. The treatment provided mild relief. Compliance with medications is %. Side effects of treatment include sexual problems.   Diabetes  He presents for his follow-up diabetic visit. He has type 2 diabetes mellitus. No MedicAlert identification noted. The initial diagnosis of diabetes was made 5 years ago. His disease course has been stable. Pertinent negatives for hypoglycemia include no dizziness, headaches, nervousness/anxiousness, pallor, speech difficulty or sweats. Pertinent negatives for diabetes include no chest pain, no fatigue, no polydipsia, no polyphagia, no polyuria, no weakness and no weight loss. Pertinent negatives for hypoglycemia complications include no blackouts, no hospitalization, no required assistance and no required glucagon injection. Symptoms are improving. Pertinent negatives for diabetic complications include no CVA or peripheral neuropathy. Risk factors for coronary artery disease include diabetes mellitus, dyslipidemia, hypertension, male sex and obesity. Current diabetic treatment includes oral agent (triple therapy). He is compliant with treatment all of the time. His weight is stable. He is following a generally healthy diet. He has not had a previous visit with a dietitian. He participates in exercise daily. His breakfast blood glucose is taken between 5-6 am. His breakfast blood glucose range is generally  mg/dl. An ACE inhibitor/angiotensin II receptor  blocker is being taken. He does not see a podiatrist. Eye exam is not current.   Hypertension  This is a chronic problem. The current episode started more than 1 year ago. The problem has been improved since onset. The problem is controlled. Associated symptoms include anxiety. Pertinent negatives include no chest pain, headaches, neck pain, peripheral edema, PND or sweats. There are no associated agents to hypertension. Risk factors for coronary artery disease include diabetes mellitus, dyslipidemia, family history, male gender, obesity and sedentary lifestyle. Past treatments include ACE inhibitors. Current antihypertension treatment includes ACE inhibitors. The current treatment provides no improvement. There are no compliance problems.  There is no history of kidney disease, CVA or heart failure. There is no history of chronic renal disease.   Hyperlipidemia  This is a chronic problem. The current episode started more than 1 year ago. The problem is uncontrolled. Recent lipid tests were reviewed and are high. Exacerbating diseases include diabetes and obesity. He has no history of chronic renal disease, liver disease or nephrotic syndrome. Pertinent negatives include no chest pain or myalgias. Current antihyperlipidemic treatment includes statins. The current treatment provides mild improvement of lipids. There are no compliance problems.  Risk factors for coronary artery disease include diabetes mellitus, dyslipidemia, family history, male sex, hypertension and obesity.   Obesity  Symptoms are chronic.   Onset was more than 5 years.   Pertinent negative symptoms include no chest pain, no fatigue, no headaches, no myalgias, no neck pain, no vertigo and no weakness.   Aggravating factors include nothing.   Treatments tried include nothing.   Improvement on treatment was mild.   Insomnia  Symptoms are chronic.   Onset was 1 to 5 years.   Symptoms occur intermittently.   Pertinent negative symptoms include no chest  "pain, no fatigue, no headaches, no myalgias, no neck pain, no vertigo and no weakness.   Aggravating factors include nothing.   Treatments tried include relaxation, rest and position changes.   Improvement on treatment was mild.     History of Present Illness  The patient presents for evaluation of weight management, sleep disturbance, and medication management.    He has been managing his weight with Zepbound 5 mg, which he reports is no longer effective. He expresses a desire to increase the dosage to 7.5 mg but is hesitant to escalate it to 10 mg at this time. He anticipates a change in his prescription drug provider to [facility] in 01/2025.    He is currently on trazodone 50 mg, which he finds beneficial for sleep, providing approximately 5 hours of restful sleep. However, he seeks an additional 2 to 3 hours of sleep and is requesting an increase in the trazodone dosage.    He expresses a desire to transition from Cymbalta to Wellbutrin.    Supplemental Information  He had acid reflux surgery and does not need to take pantoprazole anymore. He can eat whatever foods he wants, but he tries not to because he is still intent on rebuilding the valve.    MEDICATIONS  Current: trazodone, Cymbalta, Zepbound  Discontinued: pantoprazole    Objective   Vital Signs:  /76 (BP Location: Left arm, Patient Position: Sitting, Cuff Size: Large Adult)   Pulse 75   Temp 98 °F (36.7 °C) (Infrared)   Resp 20   Ht 182.9 cm (72.01\")   Wt 128 kg (282 lb 6.4 oz)   SpO2 98%   BMI 38.29 kg/m²   Estimated body mass index is 38.29 kg/m² as calculated from the following:    Height as of this encounter: 182.9 cm (72.01\").    Weight as of this encounter: 128 kg (282 lb 6.4 oz).       Class 2 Severe Obesity (BMI >=35 and <=39.9). Obesity-related health conditions include the following: hypertension, diabetes mellitus, dyslipidemias, and GERD. Obesity is worsening. BMI is is above average; BMI management plan is completed. We " discussed portion control and increasing exercise.        Physical Exam  Vitals and nursing note reviewed.   Constitutional:       General: He is awake.      Appearance: Normal appearance. He is well-developed and well-groomed.   HENT:      Head: Normocephalic and atraumatic.      Right Ear: Hearing, tympanic membrane, ear canal and external ear normal.      Left Ear: Hearing, tympanic membrane, ear canal and external ear normal.      Nose: Nose normal.      Mouth/Throat:      Lips: Pink.      Pharynx: Oropharynx is clear.   Eyes:      General: Lids are normal.      Conjunctiva/sclera: Conjunctivae normal.   Cardiovascular:      Rate and Rhythm: Normal rate and regular rhythm.      Heart sounds: Normal heart sounds.   Pulmonary:      Effort: Pulmonary effort is normal.      Breath sounds: Normal breath sounds and air entry.   Musculoskeletal:      Cervical back: Full passive range of motion without pain.      Right lower leg: No edema.      Left lower leg: No edema.   Lymphadenopathy:      Head:      Right side of head: No submental, submandibular or tonsillar adenopathy.      Left side of head: No submental, submandibular or tonsillar adenopathy.   Skin:     General: Skin is warm and dry.   Neurological:      Mental Status: He is alert.      Sensory: Sensation is intact.      Motor: Motor function is intact.      Coordination: Coordination is intact.      Gait: Gait is intact.   Psychiatric:         Attention and Perception: Attention and perception normal.         Mood and Affect: Mood and affect normal.         Speech: Speech normal.         Behavior: Behavior normal. Behavior is cooperative.         Thought Content: Thought content normal.         Cognition and Memory: Cognition and memory normal.         Judgment: Judgment normal.        Physical Exam      Result Review :          Results             Assessment and Plan     Diagnoses and all orders for this visit:    1. Type 2 diabetes mellitus with  hyperglycemia, without long-term current use of insulin (Primary)  -     Tirzepatide-Weight Management (ZEPBOUND) 7.5 MG/0.5ML solution auto-injector; Inject 0.5 mL under the skin into the appropriate area as directed 1 (One) Time Per Week.  Dispense: 2 mL; Refill: 1    2. Primary hypertension       -   continue lisinopril as prescribed     3. Primary insomnia  -     traZODone (DESYREL) 100 MG tablet; Take 1 tablet by mouth Every Night.  Dispense: 90 tablet; Refill: 1    4. Encounter for weight management  -     Tirzepatide-Weight Management (ZEPBOUND) 7.5 MG/0.5ML solution auto-injector; Inject 0.5 mL under the skin into the appropriate area as directed 1 (One) Time Per Week.  Dispense: 2 mL; Refill: 1    5. Mixed hyperlipidemia    6. Pre-diabetes  -     metFORMIN ER (GLUCOPHAGE-XR) 500 MG 24 hr tablet; Take 1 tablet by mouth Daily With Breakfast.  Dispense: 90 tablet; Refill: 1    7. Episode of moderate major depression  -     buPROPion SR (Wellbutrin SR) 150 MG 12 hr tablet; Take 1 tablet by mouth 2 (Two) Times a Day.  Dispense: 180 tablet; Refill: 1      Assessment & Plan  1. Weight management.  The current Zepbound 5 mg dosage will be discontinued. A new prescription for Zepbound 7.5 mg has been issued, with instructions to administer 2 mL for a duration of 3 months. The prescription will be sent to [facility].    2. Sleep disturbance.  The trazodone dosage will be increased from 50 mg to 100 mg. The prescription will be sent to the pharmacy.    3. Medication management.  A prescription for Wellbutrin has been issued and will be sent to [facility]. The patient requested to switch from Cymbalta to Wellbutrin.    PROCEDURE  The patient underwent acid reflux surgery.      ICD-10-CM ICD-9-CM   1. Type 2 diabetes mellitus with hyperglycemia, without long-term current use of insulin  E11.65 250.00     790.29   2. Primary hypertension  I10 401.9   3. Primary insomnia  F51.01 307.42   4. Encounter for  weight management  Z76.89 V65.49   5. Mixed hyperlipidemia  E78.2 272.2   6. Pre-diabetes  R73.03 790.29   7. Episode of moderate major depression  F32.1 296.22                Follow Up     Return in about 6 months (around 6/23/2025).  Patient was given instructions and counseling regarding his condition or for health maintenance advice. Please see specific information pulled into the AVS if appropriate.       Patient or patient representative verbalized consent for the use of Ambient Listening during the visit with  Ronit Mccabe DNP, CHUY for chart documentation. 12/23/2024  11:03 CST  Answers submitted by the patient for this visit:  Primary Reason for Visit (Submitted on 12/16/2024)  What is the primary reason for your visit?: Anxiety    Electronically signed by Ronit Mccabe DNP, CHUY, 12/23/24, 12:02 PM CST.

## 2025-01-16 ENCOUNTER — OFFICE VISIT (OUTPATIENT)
Dept: FAMILY MEDICINE CLINIC | Facility: CLINIC | Age: 55
End: 2025-01-16
Payer: COMMERCIAL

## 2025-01-16 VITALS
HEART RATE: 93 BPM | WEIGHT: 282.19 LBS | DIASTOLIC BLOOD PRESSURE: 75 MMHG | HEIGHT: 72 IN | RESPIRATION RATE: 20 BRPM | BODY MASS INDEX: 38.22 KG/M2 | OXYGEN SATURATION: 99 % | TEMPERATURE: 98 F | SYSTOLIC BLOOD PRESSURE: 149 MMHG

## 2025-01-16 DIAGNOSIS — J06.9 UPPER RESPIRATORY TRACT INFECTION, UNSPECIFIED TYPE: Primary | ICD-10-CM

## 2025-01-16 PROCEDURE — 99213 OFFICE O/P EST LOW 20 MIN: CPT | Performed by: FAMILY MEDICINE

## 2025-01-16 RX ORDER — METHYLPREDNISOLONE 4 MG/1
TABLET ORAL
Qty: 1 EACH | Refills: 0 | Status: SHIPPED | OUTPATIENT
Start: 2025-01-16

## 2025-01-16 RX ORDER — BROMPHENIRAMINE MALEATE, PSEUDOEPHEDRINE HYDROCHLORIDE, AND DEXTROMETHORPHAN HYDROBROMIDE 2; 30; 10 MG/5ML; MG/5ML; MG/5ML
5 SYRUP ORAL 4 TIMES DAILY PRN
Qty: 473 ML | Refills: 0 | Status: SHIPPED | OUTPATIENT
Start: 2025-01-16

## 2025-01-16 RX ORDER — AZITHROMYCIN 250 MG/1
TABLET, FILM COATED ORAL
Qty: 6 TABLET | Refills: 0 | Status: SHIPPED | OUTPATIENT
Start: 2025-01-16

## 2025-01-16 NOTE — PROGRESS NOTES
"Chief Complaint  URI (Pt is here for chest congestion )    Subjective        Danny Vega presents to [facility]  History of Present Illness  Mr. Delgado presents today for a sick visit.  He has nasal and sinus congestion.  He has chest congestion.  No fever.  No SOA.      Objective   Vital Signs:  /75 (BP Location: Left arm, Patient Position: Sitting, Cuff Size: Adult)   Pulse 93   Temp 98 °F (36.7 °C) (Temporal)   Resp 20   Ht 182.9 cm (72.01\")   Wt 128 kg (282 lb 3 oz)   SpO2 99%   BMI 38.26 kg/m²   Estimated body mass index is 38.26 kg/m² as calculated from the following:    Height as of this encounter: 182.9 cm (72.01\").    Weight as of this encounter: 128 kg (282 lb 3 oz).            Physical Exam  Vitals reviewed.   Constitutional:       General: He is not in acute distress.     Appearance: Normal appearance. He is normal weight. He is not ill-appearing, toxic-appearing or diaphoretic.   HENT:      Head: Normocephalic and atraumatic.      Right Ear: Tympanic membrane, ear canal and external ear normal.      Left Ear: Tympanic membrane, ear canal and external ear normal.      Nose: Nose normal. Congestion present.   Eyes:      Extraocular Movements: Extraocular movements intact.   Cardiovascular:      Rate and Rhythm: Normal rate and regular rhythm.   Pulmonary:      Effort: Pulmonary effort is normal. No respiratory distress.      Breath sounds: Normal breath sounds. No stridor.   Musculoskeletal:      Cervical back: Normal range of motion and neck supple. No rigidity or tenderness.   Skin:     General: Skin is warm and dry.      Coloration: Skin is not jaundiced or pale.   Neurological:      General: No focal deficit present.      Mental Status: He is alert and oriented to person, place, and time.   Psychiatric:         Mood and Affect: Mood normal.         Behavior: Behavior normal.         Thought Content: Thought content normal.         Judgment: Judgment " normal.            Result Review :                Assessment and Plan   Diagnoses and all orders for this visit:    1. Upper respiratory tract infection, unspecified type (Primary)  -     methylPREDNISolone (MEDROL) 4 MG dose pack; Take as directed on package instructions.  Dispense: 1 each; Refill: 0  -     brompheniramine-pseudoephedrine-DM 30-2-10 MG/5ML syrup; Take 5 mL by mouth 4 (Four) Times a Day As Needed for Allergies, Congestion or Cough.  Dispense: 473 mL; Refill: 0  -     azithromycin (ZITHROMAX) 250 MG tablet; 2 tablets by mouth on day 1, then 1 tablet by mouth on days 2-5  Dispense: 6 tablet; Refill: 0             Follow Up   No follow-ups on file.  Patient was given instructions and counseling regarding his condition or for health maintenance advice. Please see specific information pulled into the AVS if appropriate.

## 2025-01-30 DIAGNOSIS — J06.9 UPPER RESPIRATORY TRACT INFECTION, UNSPECIFIED TYPE: ICD-10-CM

## 2025-01-30 RX ORDER — METHYLPREDNISOLONE 4 MG/1
TABLET ORAL
Qty: 1 EACH | Refills: 0 | Status: SHIPPED | OUTPATIENT
Start: 2025-01-30

## 2025-02-04 ENCOUNTER — OFFICE VISIT (OUTPATIENT)
Dept: FAMILY MEDICINE CLINIC | Facility: CLINIC | Age: 55
End: 2025-02-04
Payer: COMMERCIAL

## 2025-02-04 VITALS
HEIGHT: 72 IN | OXYGEN SATURATION: 99 % | DIASTOLIC BLOOD PRESSURE: 87 MMHG | SYSTOLIC BLOOD PRESSURE: 131 MMHG | RESPIRATION RATE: 18 BRPM | WEIGHT: 269 LBS | HEART RATE: 96 BPM | BODY MASS INDEX: 36.44 KG/M2 | TEMPERATURE: 98.6 F

## 2025-02-04 DIAGNOSIS — T21.62XA: Primary | ICD-10-CM

## 2025-02-04 PROCEDURE — 99213 OFFICE O/P EST LOW 20 MIN: CPT | Performed by: NURSE PRACTITIONER

## 2025-02-04 RX ORDER — SILVER SULFADIAZINE 10 MG/G
1 CREAM TOPICAL 2 TIMES DAILY
Qty: 50 G | Refills: 1 | Status: SHIPPED | OUTPATIENT
Start: 2025-02-04
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
## 2025-02-26 ENCOUNTER — TELEPHONE (OUTPATIENT)
Dept: FAMILY MEDICINE CLINIC | Facility: CLINIC | Age: 55
End: 2025-02-26

## 2025-02-26 ENCOUNTER — OFFICE VISIT (OUTPATIENT)
Dept: FAMILY MEDICINE CLINIC | Facility: CLINIC | Age: 55
End: 2025-02-26
Payer: COMMERCIAL

## 2025-02-26 VITALS
TEMPERATURE: 98.6 F | OXYGEN SATURATION: 99 % | DIASTOLIC BLOOD PRESSURE: 70 MMHG | HEART RATE: 86 BPM | HEIGHT: 72 IN | RESPIRATION RATE: 18 BRPM | WEIGHT: 271 LBS | SYSTOLIC BLOOD PRESSURE: 118 MMHG | BODY MASS INDEX: 36.7 KG/M2

## 2025-02-26 DIAGNOSIS — E66.01 MORBID (SEVERE) OBESITY DUE TO EXCESS CALORIES: ICD-10-CM

## 2025-02-26 DIAGNOSIS — R73.03 PRE-DIABETES: ICD-10-CM

## 2025-02-26 DIAGNOSIS — J40 BRONCHITIS: Primary | ICD-10-CM

## 2025-02-26 DIAGNOSIS — R11.0 NAUSEA: ICD-10-CM

## 2025-02-26 PROCEDURE — 96372 THER/PROPH/DIAG INJ SC/IM: CPT | Performed by: NURSE PRACTITIONER

## 2025-02-26 PROCEDURE — 99214 OFFICE O/P EST MOD 30 MIN: CPT | Performed by: NURSE PRACTITIONER

## 2025-02-26 RX ORDER — ONDANSETRON 4 MG/1
4 TABLET, ORALLY DISINTEGRATING ORAL EVERY 8 HOURS PRN
Qty: 30 TABLET | Refills: 1 | Status: SHIPPED | OUTPATIENT
Start: 2025-02-26

## 2025-02-26 RX ORDER — DEXTROMETHORPHAN HYDROBROMIDE AND PROMETHAZINE HYDROCHLORIDE 15; 6.25 MG/5ML; MG/5ML
5 SYRUP ORAL 4 TIMES DAILY PRN
Qty: 120 ML | Refills: 0 | Status: SHIPPED | OUTPATIENT
Start: 2025-02-26

## 2025-02-26 RX ORDER — DEXAMETHASONE SODIUM PHOSPHATE 10 MG/ML
10 INJECTION, SOLUTION INTRA-ARTICULAR; INTRALESIONAL; INTRAMUSCULAR; INTRAVENOUS; SOFT TISSUE ONCE
Status: COMPLETED | OUTPATIENT
Start: 2025-02-26 | End: 2025-02-26

## 2025-02-26 RX ORDER — TIRZEPATIDE 7.5 MG/.5ML
7.5 INJECTION, SOLUTION SUBCUTANEOUS
COMMUNITY
Start: 2025-02-07 | End: 2025-02-26

## 2025-02-26 RX ORDER — TRAZODONE HYDROCHLORIDE 100 MG/1
100 TABLET ORAL
COMMUNITY
Start: 2025-02-07 | End: 2025-02-26 | Stop reason: SDUPTHER

## 2025-02-26 RX ADMIN — DEXAMETHASONE SODIUM PHOSPHATE 10 MG: 10 INJECTION, SOLUTION INTRA-ARTICULAR; INTRALESIONAL; INTRAMUSCULAR; INTRAVENOUS; SOFT TISSUE at 09:07

## 2025-02-26 NOTE — PROGRESS NOTES
"Chief Complaint  Cough (Patient is here with upper respiratory symptoms.)    Subjective        Danny Vega presents to [facility]    Respiratory  Symptoms are: new.   Onset was in the past 7 days.   Symptoms occur: intermittently.  Symptoms include: joint pain, nasal congestion, cough, fatigue, joint swelling, myalgias and neck pain.   Pertinent negative symptoms include no abdominal pain, no anorexia, no change in stool, no chest pain, no chills, no diaphoresis, no fever, no headaches, no nausea, no numbness, no rash, no sore throat, no swollen glands, no dysuria, no vertigo, no visual change, no vomiting and no weakness.   Cough  Associated symptoms include myalgias. Pertinent negatives include no chest pain, chills, fever, headaches, rash or sore throat.   History of Present Illness  The patient is a 54-year-old male who presents for evaluation of a respiratory infection.    He was previously treated with Bromfed and Z-Juan Miguel for a respiratory infection a couple of weeks ago. Although he initially recovered, his family contracted the illness and subsequently passed it back to him. He reports experiencing a cough and mild chest congestion. He does not have any sore throat or headaches. His appetite and hydration status remain unaffected. He also does not have any systemic symptoms such as fever, chills, nausea, vomiting, or diarrhea.    MEDICATIONS  Bromfed, Z-Juan Miguel  The following portions of the patient's history were reviewed and updated as appropriate: allergies, current medications, past family history, past medical history, past social history, past surgical history and problem list.    Objective   Vital Signs:  /70 (BP Location: Left arm, Patient Position: Sitting, Cuff Size: Large Adult)   Pulse 86   Temp 98.6 °F (37 °C) (Infrared)   Resp 18   Ht 182.9 cm (72\")   Wt 123 kg (271 lb)   SpO2 99%   BMI 36.75 kg/m²   Estimated body mass index is 36.75 kg/m² as " "calculated from the following:    Height as of this encounter: 182.9 cm (72\").    Weight as of this encounter: 123 kg (271 lb).       Class 2 Severe Obesity (BMI >=35 and <=39.9). Obesity-related health conditions include the following: hypertension, diabetes mellitus, dyslipidemias, and GERD. Obesity is unchanged. BMI is is above average; BMI management plan is completed. We discussed portion control and increasing exercise.        Physical Exam  Vitals and nursing note reviewed.   Constitutional:       General: He is not in acute distress.     Appearance: He is well-developed. He is ill-appearing. He is not diaphoretic.   HENT:      Head: Normocephalic and atraumatic.      Right Ear: Hearing, tympanic membrane, ear canal and external ear normal.      Left Ear: Hearing, tympanic membrane, ear canal and external ear normal.      Nose: Congestion present.      Right Sinus: Maxillary sinus tenderness present.      Left Sinus: Maxillary sinus tenderness present.      Mouth/Throat:      Pharynx: Uvula midline. Posterior oropharyngeal erythema present. No oropharyngeal exudate.      Tonsils: No tonsillar exudate.   Eyes:      General: Lids are normal. Lids are everted, no foreign bodies appreciated.      Conjunctiva/sclera: Conjunctivae normal.      Pupils: Pupils are equal, round, and reactive to light.   Neck:      Thyroid: No thyromegaly.      Trachea: Trachea normal.   Cardiovascular:      Rate and Rhythm: Normal rate and regular rhythm.      Heart sounds: Normal heart sounds, S1 normal and S2 normal.   Pulmonary:      Effort: Pulmonary effort is normal.      Breath sounds: Examination of the right-upper field reveals wheezing. Examination of the left-upper field reveals wheezing. Wheezing present.   Abdominal:      General: Bowel sounds are normal.      Palpations: Abdomen is soft.   Musculoskeletal:         General: Normal range of motion.      Cervical back: Full passive range of motion without pain, normal range " of motion and neck supple.   Lymphadenopathy:      Head:      Right side of head: No submental, submandibular or tonsillar adenopathy.      Left side of head: No submental, submandibular or tonsillar adenopathy.   Skin:     General: Skin is warm and dry.      Capillary Refill: Capillary refill takes less than 2 seconds.   Neurological:      Mental Status: He is alert and oriented to person, place, and time.      Sensory: Sensation is intact.      Motor: Motor function is intact.      Coordination: Coordination is intact.      Gait: Gait is intact.   Psychiatric:         Speech: Speech normal.         Behavior: Behavior normal. Behavior is cooperative.         Thought Content: Thought content normal.         Judgment: Judgment normal.      Physical Exam      Result Review :          Results             Assessment and Plan     Diagnoses and all orders for this visit:    1. Bronchitis (Primary)  -     dexAMETHasone (DECADRON) injection 10 mg  -     promethazine-dextromethorphan (PROMETHAZINE-DM) 6.25-15 MG/5ML syrup; Take 5 mL by mouth 4 (Four) Times a Day As Needed for Cough.  Dispense: 120 mL; Refill: 0    2. Nausea  -     ondansetron ODT (ZOFRAN-ODT) 4 MG disintegrating tablet; Place 1 tablet on the tongue Every 8 (Eight) Hours As Needed for Nausea or Vomiting.  Dispense: 30 tablet; Refill: 1    3. Morbid (severe) obesity due to excess calories  -     Discontinue: Tirzepatide 10 MG/0.5ML solution auto-injector; Inject 10 mg under the skin into the appropriate area as directed 1 (One) Time Per Week.  Dispense: 6 mL; Refill: 1  -     Tirzepatide 10 MG/0.5ML solution auto-injector; Inject 10 mg under the skin into the appropriate area as directed 1 (One) Time Per Week.  Dispense: 6 mL; Refill: 1    4. Pre-diabetes  -     Discontinue: Tirzepatide 10 MG/0.5ML solution auto-injector; Inject 10 mg under the skin into the appropriate area as directed 1 (One) Time Per Week.  Dispense: 6 mL; Refill: 1  -     Tirzepatide 10  MG/0.5ML solution auto-injector; Inject 10 mg under the skin into the appropriate area as directed 1 (One) Time Per Week.  Dispense: 6 mL; Refill: 1      Assessment & Plan  1. Respiratory infection.  He reports coughing and chest congestion but no sore throat, headaches, fever, chills, nausea, vomiting, or diarrhea. He was previously treated with Bromfed and Z-Juan Miguel for a respiratory infection a couple of weeks ago, but the infection recurred after exposure from family members.      ICD-10-CM ICD-9-CM   1. Bronchitis  J40 490                Follow Up     Return in about 1 week (around 3/5/2025), or if symptoms worsen or fail to improve, for Recheck 3 months for pre diabetes.  Patient was given instructions and counseling regarding his condition or for health maintenance advice. Please see specific information pulled into the AVS if appropriate.       Patient or patient representative verbalized consent for the use of Ambient Listening during the visit with  Ronit Mccabe DNP, APRN for chart documentation. 2/26/2025  08:58 CST    Electronically signed by Ronit Mccabe DNP, CHUY, 02/26/25, 9:59 AM CST.

## 2025-02-26 NOTE — TELEPHONE ENCOUNTER
Zepbound medication not able to be sent electronically to [facility] mail order pharmacy for pt. Called [facility] order to see if there is an issue with escribe meds. Reports that their pharmacy is no longer filling wegovy, zepbound, or any GLP1s for pts. Pt's will need to fill at local pharmacy.   Provider and pt notified. Med sent to pt's preferred local pharmacy.   Note placed on med for future refills

## 2025-02-26 NOTE — PATIENT INSTRUCTIONS
Obesity, Adult  Obesity is having too much body fat. Being obese means that your weight is more than what is healthy for you.   BMI (body mass index) is a number that explains how much body fat you have. If you have a BMI of 30 or more, you are obese.  Obesity can cause serious health problems, such as:  Stroke.  Coronary artery disease (CAD).  Type 2 diabetes.  Some types of cancer.  High blood pressure (hypertension).  High cholesterol.  Gallbladder stones.  Obesity can also contribute to:  Osteoarthritis.  Sleep apnea.  Infertility problems.  What are the causes?  Eating meals each day that are high in calories, sugar, and fat.  Drinking a lot of drinks that have sugar in them.  Being born with genes that may make you more likely to become obese.  Having a medical condition that causes obesity.  Taking certain medicines.  Sitting a lot (having a sedentary lifestyle).  Not getting enough sleep.  What increases the risk?  Having a family history of obesity.  Living in an area with limited access to:  [place], recreation centers, or sidewalks.  Healthy food choices, such as grocery stores and farmers' markets.  What are the signs or symptoms?  The main sign is having too much body fat.  How is this treated?  Treatment for this condition often includes changing your lifestyle. Treatment may include:  Changing your diet. This may include making a healthy meal plan.  Exercise. This may include activity that causes your heart to beat faster (aerobic exercise) and strength training. Work with your doctor to design a program that works for you.  Medicine to help you lose weight. This may be used if you are not able to lose one pound a week after 6 weeks of healthy eating and more exercise.  Treating conditions that cause the obesity.  Surgery. Options may include gastric banding and gastric bypass. This may be done if:  Other treatments have not helped to improve your condition.  You have a BMI of 40 or higher.  You have  life-threatening health problems related to obesity.  Follow these instructions at home:  Eating and drinking    Follow advice from your doctor about what to eat and drink. Your doctor may tell you to:  Limit fast food, sweets, and processed snack foods.  Choose low-fat options. For example, choose low-fat milk instead of whole milk.  Eat five or more servings of fruits or vegetables each day.  Eat at home more often. This gives you more control over what you eat.  Choose healthy foods when you eat out.  Learn to read food labels. This will help you learn how much food is in one serving.  Keep low-fat snacks available.  Avoid drinks that have a lot of sugar in them. These include soda, fruit juice, iced tea with sugar, and flavored milk.  Drink enough water to keep your pee (urine) pale yellow.  Do not go on fad diets.  Physical activity  Exercise often, as told by your doctor. Most adults should get up to 150 minutes of moderate-intensity exercise every week.Ask your doctor:  What types of exercise are safe for you.  How often you should exercise.  Warm up and stretch before being active.  Do slow stretching after being active (cool down).  Rest between times of being active.  Lifestyle  Work with your doctor and a food expert (dietitian) to set a weight-loss goal that is best for you.  Limit your screen time.  Find ways to reward yourself that do not involve food.  Do not drink alcohol if:  Your doctor tells you not to drink.  You are pregnant, may be pregnant, or are planning to become pregnant.  If you drink alcohol:  Limit how much you have to:  0-1 drink a day for women.  0-2 drinks a day for men.  Know how much alcohol is in your drink. In the [place] one drink equals one 12 oz bottle of beer (355 mL), one 5 oz glass of wine (148 mL), or one 1½ oz glass of hard liquor (44 mL).  General instructions  Keep a weight-loss journal. This can help you keep track of:  The food that you eat.  How much exercise you  get.  Take over-the-counter and prescription medicines only as told by your doctor.  Take vitamins and supplements only as told by your doctor.  Think about joining a support group.  Pay attention to your mental health as obesity can lead to depression or self esteem issues.  Keep all follow-up visits.  Contact a doctor if:  You cannot meet your weight-loss goal after you have changed your diet and lifestyle for 6 weeks.  You are having trouble breathing.  Summary  Obesity is having too much body fat.  Being obese means that your weight is more than what is healthy for you.  Work with your doctor to set a weight-loss goal.  Get regular exercise as told by your doctor.  This information is not intended to replace advice given to you by your health care provider. Make sure you discuss any questions you have with your health care provider.  Document Revised: 07/26/2022 Document Reviewed: 07/26/2022  Elsevier Patient Education © 2024 [employer]

## 2025-02-27 ENCOUNTER — PATIENT MESSAGE (OUTPATIENT)
Dept: FAMILY MEDICINE CLINIC | Facility: CLINIC | Age: 55
End: 2025-02-27
Payer: COMMERCIAL

## 2025-02-27 DIAGNOSIS — E66.01 MORBID (SEVERE) OBESITY DUE TO EXCESS CALORIES: Primary | ICD-10-CM

## 2025-03-04 DIAGNOSIS — E66.01 MORBID (SEVERE) OBESITY DUE TO EXCESS CALORIES: ICD-10-CM

## 2025-03-04 DIAGNOSIS — R73.03 PRE-DIABETES: ICD-10-CM

## 2025-03-19 DIAGNOSIS — M25.511 ACUTE PAIN OF RIGHT SHOULDER: ICD-10-CM

## 2025-03-19 DIAGNOSIS — F51.01 PRIMARY INSOMNIA: ICD-10-CM

## 2025-03-19 DIAGNOSIS — R73.03 PRE-DIABETES: ICD-10-CM

## 2025-03-19 DIAGNOSIS — F32.1 EPISODE OF MODERATE MAJOR DEPRESSION: ICD-10-CM

## 2025-03-19 DIAGNOSIS — N52.9 ERECTILE DYSFUNCTION, UNSPECIFIED ERECTILE DYSFUNCTION TYPE: ICD-10-CM

## 2025-03-19 DIAGNOSIS — K21.00 GASTROESOPHAGEAL REFLUX DISEASE WITH ESOPHAGITIS WITHOUT HEMORRHAGE: ICD-10-CM

## 2025-03-20 RX ORDER — ATORVASTATIN CALCIUM 20 MG/1
20 TABLET, FILM COATED ORAL DAILY
Qty: 90 TABLET | Refills: 3 | Status: SHIPPED | OUTPATIENT
Start: 2025-03-20

## 2025-03-20 RX ORDER — METFORMIN HYDROCHLORIDE 500 MG/1
500 TABLET, EXTENDED RELEASE ORAL
Qty: 90 TABLET | Refills: 1 | Status: SHIPPED | OUTPATIENT
Start: 2025-03-20

## 2025-03-20 RX ORDER — SILDENAFIL 100 MG/1
100 TABLET, FILM COATED ORAL DAILY PRN
Qty: 90 TABLET | Refills: 3 | Status: SHIPPED | OUTPATIENT
Start: 2025-03-20

## 2025-03-20 RX ORDER — BUPROPION HYDROCHLORIDE 150 MG/1
150 TABLET, EXTENDED RELEASE ORAL 2 TIMES DAILY
Qty: 180 TABLET | Refills: 1 | Status: SHIPPED | OUTPATIENT
Start: 2025-03-20

## 2025-03-20 RX ORDER — TRAZODONE HYDROCHLORIDE 100 MG/1
100 TABLET ORAL NIGHTLY
Qty: 90 TABLET | Refills: 1 | Status: SHIPPED | OUTPATIENT
Start: 2025-03-20

## 2025-03-20 NOTE — TELEPHONE ENCOUNTER
Rx Refill Note  Requested Prescriptions     Pending Prescriptions Disp Refills    sildenafil (VIAGRA) 100 MG tablet 30 tablet 5     Sig: Take 1 tablet by mouth Daily As Needed for Erectile Dysfunction.    tiZANidine (ZANAFLEX) 4 MG tablet 90 tablet 11     Sig: Take 1 tablet by mouth Every 8 (Eight) Hours As Needed for Muscle Spasms.    atorvastatin (LIPITOR) 20 MG tablet 90 tablet 3     Sig: Take 1 tablet by mouth Daily.    metFORMIN ER (GLUCOPHAGE-XR) 500 MG 24 hr tablet 90 tablet 1     Sig: Take 1 tablet by mouth Daily With Breakfast.    traZODone (DESYREL) 100 MG tablet 90 tablet 1     Sig: Take 1 tablet by mouth Every Night.    buPROPion SR (Wellbutrin SR) 150 MG 12 hr tablet 180 tablet 1     Sig: Take 1 tablet by mouth 2 (Two) Times a Day.      Last office visit with prescribing clinician: 2/26/2025     Next office visit with prescribing clinician: 6/23/2025                         Would you like a call back once the refill request has been completed: [] Yes [] No    If the office needs to give you a call back, can they leave a voicemail: [] Yes [] No    Nguyen Anderson MA  03/20/25, 12:57 CDT

## 2025-04-02 ENCOUNTER — PATIENT MESSAGE (OUTPATIENT)
Dept: FAMILY MEDICINE CLINIC | Facility: CLINIC | Age: 55
End: 2025-04-02
Payer: COMMERCIAL

## 2025-04-03 NOTE — TELEPHONE ENCOUNTER
Rx Refill Note  Requested Prescriptions     Pending Prescriptions Disp Refills    Tirzepatide-Weight Management (ZEPBOUND) 12.5 MG/0.5ML solution auto-injector 2 mL 0     Sig: Inject 0.5 mL under the skin into the appropriate area as directed 1 (One) Time Per Week.      Last office visit with prescribing clinician: 2/26/2025   Next office visit with prescribing clinician: 6/23/2025       Kena Latham MA  04/03/25, 08:04 CDT

## 2025-05-05 RX ORDER — TIRZEPATIDE 12.5 MG/.5ML
INJECTION, SOLUTION SUBCUTANEOUS
Qty: 2 ML | Refills: 1 | Status: SHIPPED | OUTPATIENT
Start: 2025-05-05

## 2025-05-05 NOTE — TELEPHONE ENCOUNTER
Rx Refill Note  Requested Prescriptions     Pending Prescriptions Disp Refills    Zepbound 12.5 MG/0.5ML solution auto-injector [Pharmacy Med Name: ZEPBOUND 12.5 MG/0.5ML SOAJ 12.5 Solution Auto-injector] 2 mL 1     Sig: Inject 0.5 mL under the skin into the appropriate area as directed 1 (One) Time Per Week.      Last office visit with prescribing clinician: 2/26/2025   Last telemedicine visit with prescribing clinician: Visit date not found   Next office visit with prescribing clinician: 6/23/2025     Debra Valencia MA  05/05/25, 08:32 CDT

## 2025-06-04 RX ORDER — TIRZEPATIDE 12.5 MG/.5ML
INJECTION, SOLUTION SUBCUTANEOUS
Qty: 2 ML | Refills: 1 | OUTPATIENT
Start: 2025-06-04

## 2025-06-05 ENCOUNTER — TELEPHONE (OUTPATIENT)
Dept: FAMILY MEDICINE CLINIC | Facility: CLINIC | Age: 55
End: 2025-06-05
Payer: COMMERCIAL

## 2025-06-05 RX ORDER — TIRZEPATIDE 12.5 MG/.5ML
INJECTION, SOLUTION SUBCUTANEOUS
Qty: 2 ML | Refills: 1 | Status: CANCELLED | OUTPATIENT
Start: 2025-06-05

## 2025-06-05 NOTE — TELEPHONE ENCOUNTER
Pt is requesting a 90 day supply    Rx Refill Note  Requested Prescriptions     Pending Prescriptions Disp Refills    Tirzepatide-Weight Management (Zepbound) 12.5 MG/0.5ML solution auto-injector 2 mL 1      Last office visit with prescribing clinician: 2/26/2025   Last telemedicine visit with prescribing clinician: Visit date not found   Next office visit with prescribing clinician: 6/23/2025         Nguyen Anderson MA  06/05/25, 16:24 CDT

## 2025-06-05 NOTE — TELEPHONE ENCOUNTER
"  Caller: Danny Vega \"Chucho\"    Relationship: Self    Best call back number: [phone]    What was the call regarding: PATIENT IS REQUESTING A 90 DAY SUPPLY OF Tirzepatide-Weight Management (ZEPBOUND) 10 MG/0.5ML solution auto-injector BE SENT TO THE PHARMACY.    PATIENT ALSO STATES THAT HE CAN FILE FOR AN APPEAL FOR THIS MEDICATION AFTER JULY 1ST TO TRY AND GET IT APPROVED    "

## 2025-06-06 DIAGNOSIS — E66.01 MORBID (SEVERE) OBESITY DUE TO EXCESS CALORIES: ICD-10-CM

## 2025-06-06 DIAGNOSIS — R73.03 PRE-DIABETES: ICD-10-CM

## 2025-06-06 DIAGNOSIS — I10 ESSENTIAL HYPERTENSION: Primary | ICD-10-CM

## 2025-06-06 RX ORDER — PANTOPRAZOLE SODIUM 40 MG/1
40 TABLET, DELAYED RELEASE ORAL 2 TIMES DAILY
COMMUNITY
Start: 2025-05-20

## 2025-06-24 ENCOUNTER — OFFICE VISIT (OUTPATIENT)
Dept: FAMILY MEDICINE CLINIC | Facility: CLINIC | Age: 55
End: 2025-06-24
Payer: COMMERCIAL

## 2025-06-24 VITALS
SYSTOLIC BLOOD PRESSURE: 129 MMHG | OXYGEN SATURATION: 97 % | WEIGHT: 259.8 LBS | HEART RATE: 69 BPM | BODY MASS INDEX: 35.19 KG/M2 | DIASTOLIC BLOOD PRESSURE: 84 MMHG | HEIGHT: 72 IN | TEMPERATURE: 98.3 F

## 2025-06-24 DIAGNOSIS — E29.1 HYPOGONADISM IN MALE: ICD-10-CM

## 2025-06-24 DIAGNOSIS — F51.01 PRIMARY INSOMNIA: ICD-10-CM

## 2025-06-24 DIAGNOSIS — E11.65 TYPE 2 DIABETES MELLITUS WITH HYPERGLYCEMIA, WITHOUT LONG-TERM CURRENT USE OF INSULIN: ICD-10-CM

## 2025-06-24 DIAGNOSIS — F41.9 ANXIETY: ICD-10-CM

## 2025-06-24 DIAGNOSIS — F95.9 TIC DISORDER: ICD-10-CM

## 2025-06-24 DIAGNOSIS — K22.70 BARRETT'S ESOPHAGUS WITHOUT DYSPLASIA: ICD-10-CM

## 2025-06-24 DIAGNOSIS — I10 ESSENTIAL HYPERTENSION: ICD-10-CM

## 2025-06-24 DIAGNOSIS — E66.01 MORBID (SEVERE) OBESITY DUE TO EXCESS CALORIES: ICD-10-CM

## 2025-06-24 DIAGNOSIS — E78.2 MIXED HYPERLIPIDEMIA: Primary | ICD-10-CM

## 2025-06-24 DIAGNOSIS — K21.9 GASTROESOPHAGEAL REFLUX DISEASE WITHOUT ESOPHAGITIS: ICD-10-CM

## 2025-06-24 PROCEDURE — 99214 OFFICE O/P EST MOD 30 MIN: CPT | Performed by: NURSE PRACTITIONER

## 2025-06-24 RX ORDER — HYDROXYZINE HYDROCHLORIDE 25 MG/1
25 TABLET, FILM COATED ORAL 3 TIMES DAILY PRN
Qty: 270 TABLET | Refills: 1 | Status: SHIPPED | OUTPATIENT
Start: 2025-06-24 | End: 2025-06-24 | Stop reason: SDUPTHER

## 2025-06-24 RX ORDER — HYDROXYZINE HYDROCHLORIDE 25 MG/1
25 TABLET, FILM COATED ORAL 3 TIMES DAILY PRN
COMMUNITY

## 2025-06-24 RX ORDER — HYDROXYZINE HYDROCHLORIDE 25 MG/1
25 TABLET, FILM COATED ORAL 3 TIMES DAILY PRN
Qty: 90 TABLET | Refills: 0 | Status: SHIPPED | OUTPATIENT
Start: 2025-06-24

## 2025-06-24 RX ORDER — LISINOPRIL 20 MG/1
20 TABLET ORAL DAILY
Qty: 90 TABLET | Refills: 2 | Status: SHIPPED | OUTPATIENT
Start: 2025-06-24

## 2025-06-24 RX ORDER — PANTOPRAZOLE SODIUM 40 MG/1
40 TABLET, DELAYED RELEASE ORAL 2 TIMES DAILY
Qty: 90 TABLET | Refills: 1 | Status: SHIPPED | OUTPATIENT
Start: 2025-06-24

## 2025-06-24 NOTE — PROGRESS NOTES
Chief Complaint  6 month f/u    Subjective        Danny Vega presents to [facility]  History of Present Illness  History of Present Illness  The patient is a 54-year-old male who presents for a 6-month follow-up for hyperlipidemia, depression, anxiety, hypertension, diabetes, GERD, insomnia, sleep apnea, obesity and muscle spasms.    He has been diagnosed with obesity and had been taking the 90-day supply of Zepbound. He reports that his insurance will not cover the cost of Zepbound for weight loss after July 1.  He plans to contact the office after 07/01/2025 regarding this issue.    His hypertension has been chronic and stable with lisinopril. He reports no chest pain or shortness of breath. He has CAD risk factors including dyslipidemia, diabetes, family history, male gender, and higher body weight. He is currently on an ACE inhibitor with improvement and has no end-organ damage.    Hyperlipidemia has been present for more than a year, and he is currently taking atorvastatin without any problems, reporting no muscle aches or cramps.    He reports that his acid reflux is well-managed, and he is making significant progress with his Somers's esophagus. He had a follow-up appointment last Monday and is scheduled for another one in August, which will include a colonoscopy. He avoids late-night eating and caffeine consumption. He is on pantoprazole.    His diabetes is managed with metformin, and his highest recorded HbA1c is 6.2.    He occasionally experiences erectile dysfunction, which he attributes to psychological factors. He is on sildenafil.    He has been experiencing insomnia for over a year. When not working, he typically sleeps for 6 to 8 hours per night. He finds that trazodone helps him sleep and also alleviates his tics. However, when working night shifts or under stress, his tics return. He is on trazodone.    He has been experiencing shoulder pain, which he  "believes is due to sleeping on it excessively. He tends to ignore it.    He has been experiencing muscle spasms.    He has been experiencing anxiety.    He has been experiencing depression.    He has been experiencing anxiety which has worsened over the last month due to the weight    SOCIAL HISTORY  He does not consume caffeine.    The following portions of the patient's history were reviewed and updated as appropriate: allergies, current medications, past family history, past medical history, past social history, past surgical history and problem list.    Objective   Vital Signs:  /84   Pulse 69   Temp 98.3 °F (36.8 °C) (Temporal)   Ht 182.9 cm (72\")   Wt 118 kg (259 lb 12.8 oz)   SpO2 97%   BMI 35.24 kg/m²   Estimated body mass index is 35.24 kg/m² as calculated from the following:    Height as of this encounter: 182.9 cm (72\").    Weight as of this encounter: 118 kg (259 lb 12.8 oz).               Physical Exam  Vitals and nursing note reviewed.   Constitutional:       General: He is awake.      Appearance: Normal appearance. He is well-developed and well-groomed.   HENT:      Head: Normocephalic and atraumatic.      Right Ear: Hearing, tympanic membrane, ear canal and external ear normal.      Left Ear: Hearing, tympanic membrane, ear canal and external ear normal.      Nose: Nose normal.      Mouth/Throat:      Lips: Pink.      Pharynx: Oropharynx is clear.   Eyes:      General: Lids are normal.      Conjunctiva/sclera: Conjunctivae normal.   Cardiovascular:      Rate and Rhythm: Normal rate and regular rhythm.      Heart sounds: Normal heart sounds.   Pulmonary:      Effort: Pulmonary effort is normal.      Breath sounds: Normal breath sounds and air entry.   Musculoskeletal:      Cervical back: Full passive range of motion without pain.      Right lower leg: No edema.      Left lower leg: No edema.      Right foot: Normal range of motion. No deformity, bunion, Charcot foot, foot drop or prominent " metatarsal heads.      Left foot: Normal range of motion. No deformity, bunion, Charcot foot, foot drop or prominent metatarsal heads.   Feet:      Right foot:      Skin integrity: Skin integrity normal.      Toenail Condition: Right toenails are abnormally thick.      Left foot:      Skin integrity: Skin integrity normal.      Toenail Condition: Left toenails are abnormally thick.   Lymphadenopathy:      Head:      Right side of head: No submental, submandibular or tonsillar adenopathy.      Left side of head: No submental, submandibular or tonsillar adenopathy.   Skin:     General: Skin is warm and dry.   Neurological:      Mental Status: He is alert.      Sensory: Sensation is intact.      Motor: Motor function is intact.      Coordination: Coordination is intact.      Gait: Gait is intact.   Psychiatric:         Attention and Perception: Attention and perception normal.         Mood and Affect: Mood and affect normal.         Speech: Speech normal.         Behavior: Behavior normal. Behavior is cooperative.         Thought Content: Thought content normal.         Cognition and Memory: Cognition and memory normal.         Judgment: Judgment normal.      Physical Exam      Result Review :          Results  Labs   - Blood Sugar: 6.2           Assessment and Plan     Diagnoses and all orders for this visit:    1. Mixed hyperlipidemia (Primary)  -     Lipid panel    2. Type 2 diabetes mellitus with hyperglycemia, without long-term current use of insulin  -     ORDER: Hemoglobin A1c  -     Microalbumin / Creatinine Urine Ratio - Urine, Clean Catch  -     CBC (No Diff)  -     Comprehensive metabolic panel    3. Hypogonadism in male  -     Testosterone    4. Essential hypertension  -     lisinopril (PRINIVIL,ZESTRIL) 20 MG tablet; Take 1 tablet by mouth Daily.  Dispense: 90 tablet; Refill: 2    5. Somers's esophagus without dysplasia  -     pantoprazole (PROTONIX) 40 MG EC tablet; Take 1 tablet by mouth 2 (Two) Times a  Day.  Dispense: 90 tablet; Refill: 1    6. Gastroesophageal reflux disease without esophagitis  -     pantoprazole (PROTONIX) 40 MG EC tablet; Take 1 tablet by mouth 2 (Two) Times a Day.  Dispense: 90 tablet; Refill: 1    7. Primary insomnia    8. Anxiety  -    hydrOXYzine (ATARAX) 25 MG tablet; Take 1 tablet by mouth 3 (Three) Times a Day As Needed for Itching.  Dispense: 270 tablet; Refill: 1 sent to mail order pharmacy  -     hydrOXYzine (ATARAX) 25 MG tablet; Take 1 tablet by mouth 3 (Three) Times a Day As Needed for anxiety Dispense: 30 tablet; Refill: 0 sent to local pharmacy    9. Tic disorder      He has had for a long time, just worse because of all the problems with the zepbound.       10. Obesity       Continue on the zepbound and let me know if the insurance does anything different.     Assessment & Plan  1. Hyperlipidemia.  - Chronic condition present for over a year.  - Currently on atorvastatin 20 mg daily without adverse effects such as muscle aches or cramps.  - Atorvastatin 20 mg refill provided.    2. Depression.  - Experiencing depression for more than a year.  - Wellbutrin refilled on 06/20/2025.    3. Anxiety.  - Experiencing anxiety for more than a year.  - Hydroxyzine prescribed to be taken up to three times a day as needed.    4. Hypertension.  - Chronic condition stable with lisinopril.  - No chest pain, shortness of breath, or use of agents associated with hypertension such as amphetamines or anorectics.  - CAD risk factors include dyslipidemia, diabetes, family history, male gender, and obesity. Current treatment with ACE inhibitor shows improvement. No end-organ damage.  - Lisinopril refill provided.    5. Diabetes.  - Highest recorded blood sugar level was 6.2.  - Currently on metformin without issues.  - Metformin refilled on 06/25/2025.    6. Gastroesophageal Reflux Disease (GERD).  - Making great progress with Somers's esophagus; scheduled for another round of treatment in August  along with a colonoscopy.  - Currently on pantoprazole.  - Pantoprazole refill provided.    7. Insomnia.  - Experiencing insomnia for more than a year.  - Usually sleeps 6 to 8 hours when taking trazodone.  - Trazodone refill provided.    8. Muscle Spasms.  - Experiencing muscle spasms.    9. Erectile Dysfunction.  - Occasional issues with achieving or maintaining an erection, attributed to psychological factors.  - Sildenafil refill provided.    10. Health Maintenance.  - Declined pneumonia vaccine at this time.  - Advised to get shingles vaccine at a pharmacy.  - Blood work ordered.      ICD-10-CM ICD-9-CM   1. Mixed hyperlipidemia  E78.2 272.2   2. Type 2 diabetes mellitus with hyperglycemia, without long-term current use of insulin  E11.65 250.00     790.29   3. Hypogonadism in male  E29.1 257.2   4. Essential hypertension  I10 401.9   5. Somers's esophagus without dysplasia  K22.70 530.85   6. Gastroesophageal reflux disease without esophagitis  K21.9 530.81   7. Primary insomnia  F51.01 307.42   8. Anxiety  F41.9 300.00   9. Tic disorder  F95.9 307.20   10. Morbid (severe) obesity due to excess calories  E66.01 278.01                Follow Up     Return in about 6 months (around 12/24/2025) for Recheck.  Patient was given instructions and counseling regarding his condition or for health maintenance advice. Please see specific information pulled into the AVS if appropriate.       Patient or patient representative verbalized consent for the use of Ambient Listening during the visit with  Ronit Mccabe DNP, APRN for chart documentation. 6/24/2025  12:37 CDT    Electronically signed by Ronit Mccabe DNP, APRN, 06/24/25, 12:38 PM CDT.

## 2025-06-25 LAB
ALBUMIN SERPL-MCNC: 4.5 G/DL (ref 3.5–5.2)
ALBUMIN/CREAT UR: <4 MG/G CREAT (ref 0–29)
ALBUMIN/GLOB SERPL: 1.6 G/DL
ALP SERPL-CCNC: 84 U/L (ref 39–117)
ALT SERPL-CCNC: 19 U/L (ref 1–41)
AST SERPL-CCNC: 19 U/L (ref 1–40)
BILIRUB SERPL-MCNC: 0.3 MG/DL (ref 0–1.2)
BUN SERPL-MCNC: 15 MG/DL (ref 6–20)
BUN/CREAT SERPL: 16.1 (ref 7–25)
CALCIUM SERPL-MCNC: 9.9 MG/DL (ref 8.6–10.5)
CHLORIDE SERPL-SCNC: 100 MMOL/L (ref 98–107)
CHOLEST SERPL-MCNC: 110 MG/DL (ref 0–200)
CO2 SERPL-SCNC: 27.3 MMOL/L (ref 22–29)
CREAT SERPL-MCNC: 0.93 MG/DL (ref 0.76–1.27)
CREAT UR-MCNC: 75.7 MG/DL
EGFRCR SERPLBLD CKD-EPI 2021: 97.6 ML/MIN/1.73
ERYTHROCYTE [DISTWIDTH] IN BLOOD BY AUTOMATED COUNT: 14.5 % (ref 12.3–15.4)
GLOBULIN SER CALC-MCNC: 2.8 GM/DL
GLUCOSE SERPL-MCNC: 100 MG/DL (ref 65–99)
HBA1C MFR BLD: 5.3 % (ref 4.8–5.6)
HCT VFR BLD AUTO: 46.7 % (ref 37.5–51)
HDLC SERPL-MCNC: 43 MG/DL (ref 40–60)
HGB BLD-MCNC: 14.7 G/DL (ref 13–17.7)
LDLC SERPL CALC-MCNC: 53 MG/DL (ref 0–100)
MCH RBC QN AUTO: 29.6 PG (ref 26.6–33)
MCHC RBC AUTO-ENTMCNC: 31.5 G/DL (ref 31.5–35.7)
MCV RBC AUTO: 94 FL (ref 79–97)
MICROALBUMIN UR-MCNC: <3 UG/ML
PLATELET # BLD AUTO: 307 10*3/MM3 (ref 140–450)
POTASSIUM SERPL-SCNC: 4.4 MMOL/L (ref 3.5–5.2)
PROT SERPL-MCNC: 7.3 G/DL (ref 6–8.5)
RBC # BLD AUTO: 4.97 10*6/MM3 (ref 4.14–5.8)
SODIUM SERPL-SCNC: 139 MMOL/L (ref 136–145)
TESTOST SERPL-MCNC: 739 NG/DL (ref 264–916)
TRIGL SERPL-MCNC: 65 MG/DL (ref 0–150)
VLDLC SERPL CALC-MCNC: 14 MG/DL (ref 5–40)
WBC # BLD AUTO: 9.47 10*3/MM3 (ref 3.4–10.8)

## 2025-08-05 DIAGNOSIS — E11.65 TYPE 2 DIABETES MELLITUS WITH HYPERGLYCEMIA, WITHOUT LONG-TERM CURRENT USE OF INSULIN: ICD-10-CM

## 2025-08-05 DIAGNOSIS — Z91.89 CARDIOVASCULAR RISK FACTOR: Primary | ICD-10-CM

## 2025-08-05 RX ORDER — SEMAGLUTIDE 1 MG/.5ML
1 INJECTION, SOLUTION SUBCUTANEOUS WEEKLY
Qty: 4 ML | Refills: 0 | Status: SHIPPED | OUTPATIENT
Start: 2025-08-05

## 2025-08-12 DIAGNOSIS — K22.70 BARRETT'S ESOPHAGUS WITHOUT DYSPLASIA: ICD-10-CM

## 2025-08-12 DIAGNOSIS — K21.9 GASTROESOPHAGEAL REFLUX DISEASE WITHOUT ESOPHAGITIS: ICD-10-CM

## 2025-08-13 RX ORDER — PANTOPRAZOLE SODIUM 40 MG/1
40 TABLET, DELAYED RELEASE ORAL 2 TIMES DAILY
Qty: 180 TABLET | Refills: 1 | Status: SHIPPED | OUTPATIENT
Start: 2025-08-13

## 2025-08-15 DIAGNOSIS — K22.70 BARRETT'S ESOPHAGUS WITHOUT DYSPLASIA: ICD-10-CM

## 2025-08-15 DIAGNOSIS — K21.9 GASTROESOPHAGEAL REFLUX DISEASE WITHOUT ESOPHAGITIS: ICD-10-CM

## 2025-08-15 RX ORDER — PANTOPRAZOLE SODIUM 40 MG/1
40 TABLET, DELAYED RELEASE ORAL 2 TIMES DAILY
Qty: 180 TABLET | Refills: 3 | OUTPATIENT
Start: 2025-08-15

## (undated) DEVICE — CATHETER THERMOABLAT DIA18-31MM BLLN L8CM ELECTRD L4CM 16

## (undated) DEVICE — ENDO KIT,LOURDES HOSPITAL: Brand: MEDLINE INDUSTRIES, INC.

## (undated) DEVICE — Z DUPLICATE USE 2738952 SYSTEM VENT M AD NSL PAP DEV HD STRP 2L HYPRINFL BG MRI

## (undated) DEVICE — SYSTEM KIT LG AD SUPERNOVA ET

## (undated) DEVICE — FORCEPS BX L240CM JAW DIA2.4MM ORNG L CAP W/ NDL DISP RAD

## (undated) DEVICE — CAP ENDOSCP CLN M DIA9.8-11.1MM ES CAUT ABLATED TISS REM

## (undated) DEVICE — MASK VENTILATOR LG AD SUPERNOVA ET

## (undated) DEVICE — GUIDEWIRE ENDOSCP STR 0.035 INX260 CM STIFF RND DREAMWIRE ST

## (undated) DEVICE — GUIDEWIRE ENDOSCP L230CM DIA0.038IN DEPTH MRK STR FLX DST

## (undated) DEVICE — GUIDEWIRE ENDO L260CM DIA0.035IN BILI STD HI PERF STR RND